# Patient Record
Sex: FEMALE | URBAN - METROPOLITAN AREA
[De-identification: names, ages, dates, MRNs, and addresses within clinical notes are randomized per-mention and may not be internally consistent; named-entity substitution may affect disease eponyms.]

---

## 2018-06-13 ENCOUNTER — APPOINTMENT (RX ONLY)
Dept: URBAN - METROPOLITAN AREA CLINIC 100 | Facility: CLINIC | Age: 78
Setting detail: DERMATOLOGY
End: 2018-06-13

## 2018-06-13 DIAGNOSIS — Z41.9 ENCOUNTER FOR PROCEDURE FOR PURPOSES OTHER THAN REMEDYING HEALTH STATE, UNSPECIFIED: ICD-10-CM

## 2018-06-13 PROBLEM — I10 ESSENTIAL (PRIMARY) HYPERTENSION: Status: ACTIVE | Noted: 2018-06-13

## 2018-06-13 PROBLEM — L57.0 ACTINIC KERATOSIS: Status: ACTIVE | Noted: 2018-06-13

## 2018-06-13 PROCEDURE — ? FILLERS

## 2018-06-13 NOTE — PROCEDURE: FILLERS
Brows Filler  Volume In Cc: 0
Additional Area 5 Volume In Cc: 1
Price (Use Numbers Only, No Special Characters Or $): 0.00
Additional Anesthesia Volume In Cc: 6
Use Map Statement For Sites (Optional): No
Additional Area 2 Location: cheeks
Anesthesia Volume In Cc: 0.2
Additional Area 5 Location: cheeks and jaws
Post-Care Instructions: Patient instructed to apply ice to reduce swelling.
Detail Level: Zone
Additional Area 1 Location: using the cannula to lateral cheeks, using the conventional needle to lateral jawline
Additional Area 4 Location: fine line cheeks diluted with 0.2 lido
Map Statment: See [address] for Complete Details
Lot #: [id number]
Expiration Date (Month Year): 11/31/2020
Additional Area 2 Location: lateral cheeks, lateral mouth, marionette lines, lips, fine lines upper lip, glabella, nasal br
Additional Area 1 Location: lateral mouth, marionette lines, cicatrix left upper lip
Additional Area 3 Location: lateral eyes, nasal root
Expiration Date (Month Year): 10/31/2020
Lot #: [id number] [facility]
Additional Area 3 Location: lateral mouth, cheeks, perioral fine lines, lateral jaw
Consent: Written consent obtained. Risks include but not limited to bruising, beading, irregular texture, ulceration, infection, allergic reaction, scar formation, incomplete augmentation, temporary nature, procedural pain.
Filler: Kar Taylor

## 2019-12-18 ENCOUNTER — APPOINTMENT (RX ONLY)
Dept: URBAN - METROPOLITAN AREA CLINIC 100 | Facility: CLINIC | Age: 79
Setting detail: DERMATOLOGY
End: 2019-12-18

## 2019-12-18 DIAGNOSIS — Z41.9 ENCOUNTER FOR PROCEDURE FOR PURPOSES OTHER THAN REMEDYING HEALTH STATE, UNSPECIFIED: ICD-10-CM

## 2019-12-18 PROCEDURE — ? FILLERS

## 2019-12-18 NOTE — PROCEDURE: FILLERS
Nasolabial Folds Filler Volume In Cc: 0
Lot #: [id number]
Lot #: [id number]
Map Statment: See [address] for Complete Details
Include Cannula Information In Note?: No
Expiration Date (Month Year): 09/2021
Expiration Date (Month Year): 2022-4-30
Anesthesia Type: 1% lidocaine without epinephrine
Lot #: [id number]
Include Cannula Brand?: DermaSculpt
Expiration Date (Month Year): 08/31/2019
Include Cannula Size?: 25G
Additional Area 1 Location: , lateral mouth,diluted to inject lip fine lines
Include Cannula Length?: 1.5 inch
Additional Anesthesia Volume In Cc: 6
Additional Area 1 Location: lateral mouth, perioral fine lines, vermillion lips, marionette lines
Additional Area 1 Volume In Cc: 1
Additional Area 2 Location: Lips, oral commissure, glabellar fine fines
Additional Area 2 Location: cheeks, jawline, oral commissure
Consent: Written consent obtained. Risks include but not limited to bruising, beading, irregular texture, ulceration, infection, allergic reaction, scar formation, incomplete augmentation, temporary nature, procedural pain.
Additional Area 3 Location: Glabbellar fine lines, Nasalabial folds, Marionette lines, vermillion lip
Post-Care Instructions: Patient instructed to apply ice to reduce swelling.
Filler: Restylane-L
Additional Area 1 Location: lateral mouth, fine lines perioral, marionette lines, lateral cheeks, vermillion lips
Additional Area 4 Location: lateral jawline, lateral mouth, glabella lines,
Detail Level: Zone
Additional Area 4 Location: Perioral
Additional Area 2 Location: Nasalabial, Glabellar fine lines- Complimentary
Additional Area 5 Location: Cheeks, vermillion lips, marionette fine lines, glabellar fine lines, lateral mouth
Price (Use Numbers Only, No Special Characters Or $): 0.00

## 2021-10-13 ENCOUNTER — APPOINTMENT (RX ONLY)
Dept: URBAN - METROPOLITAN AREA CLINIC 100 | Facility: CLINIC | Age: 81
Setting detail: DERMATOLOGY
End: 2021-10-13

## 2021-10-13 DIAGNOSIS — Z41.9 ENCOUNTER FOR PROCEDURE FOR PURPOSES OTHER THAN REMEDYING HEALTH STATE, UNSPECIFIED: ICD-10-CM

## 2021-10-13 PROCEDURE — ? FILLERS

## 2021-10-13 NOTE — PROCEDURE: FILLERS
Additional Area 4 Location: lateral jawline, lateral mouth, glabella lines,
Tear Troughs Filler  Volume In Cc: 0
Post-Care Instructions: Patient instructed to apply ice to reduce swelling.
Price (Use Numbers Only, No Special Characters Or $): 0.00
Include Cannula Information In Note?: No
Additional Area 4 Location: Perioral
Additional Area 5 Location: Cheeks, vermillion lips, marionette fine lines, glabellar fine lines, lateral mouth
Lot #: [id number]
Lot #: [id number]
Map Statment: See [address] for Complete Details
Expiration Date (Month Year): 08/31/2019
Lot #: [id number]
Expiration Date (Month Year): 2024-01-31
Anesthesia Type: 1% lidocaine without epinephrine
Expiration Date (Month Year): 09/2021
Additional Area 1 Location: lateral mouth, fine lines perioral, marionette lines, lateral cheeks, vermillion lips
Include Cannula Brand?: DermaSculpt
Include Cannula Size?: 25G
Additional Anesthesia Volume In Cc: 6
Additional Area 2 Location: Nasalabial, Glabellar fine lines- Complimentary
Additional Area 1 Location: cheeks, lateral mouth, nlfs , glabella
Include Cannula Length?: 1.5 inch
Additional Area 1 Location: lateral mouth, perioral fine lines, vermillion lips, marionette lines
Additional Area 2 Location: perioral fine lines, vermillion lips, marionette lines.
Additional Area 2 Location: Lips, oral commissure, glabellar fine fines
Additional Area 2 Volume In Cc: 1
Filler: Restylane-L
Consent: Written consent obtained. Risks include but not limited to bruising, beading, irregular texture, ulceration, infection, allergic reaction, scar formation, incomplete augmentation, temporary nature, procedural pain.
Detail Level: Zone
Additional Area 3 Location: Glabbellar fine lines, Nasalabial folds, Marionette lines, vermillion lip

## 2025-03-27 ENCOUNTER — INPATIENT (INPATIENT)
Facility: HOSPITAL | Age: 85
LOS: 3 days | Discharge: HOME | End: 2025-03-31
Attending: HOSPITALIST | Admitting: HOSPITALIST
Payer: MEDICARE

## 2025-03-27 VITALS
OXYGEN SATURATION: 98 % | WEIGHT: 134.92 LBS | HEART RATE: 100 BPM | SYSTOLIC BLOOD PRESSURE: 123 MMHG | RESPIRATION RATE: 18 BRPM | TEMPERATURE: 100 F | HEIGHT: 63 IN | DIASTOLIC BLOOD PRESSURE: 80 MMHG

## 2025-03-27 LAB
ALBUMIN SERPL ELPH-MCNC: 4.3 G/DL — SIGNIFICANT CHANGE UP (ref 3.3–5)
ALP SERPL-CCNC: 75 U/L — SIGNIFICANT CHANGE UP (ref 40–120)
ALT FLD-CCNC: 14 U/L — SIGNIFICANT CHANGE UP (ref 4–33)
ANION GAP SERPL CALC-SCNC: 18 MMOL/L — HIGH (ref 7–14)
ANISOCYTOSIS BLD QL: SLIGHT — SIGNIFICANT CHANGE UP
APPEARANCE UR: CLEAR — SIGNIFICANT CHANGE UP
APTT BLD: 29.5 SEC — SIGNIFICANT CHANGE UP (ref 24.5–35.6)
AST SERPL-CCNC: 25 U/L — SIGNIFICANT CHANGE UP (ref 4–32)
BACTERIA # UR AUTO: ABNORMAL /HPF
BASOPHILS # BLD AUTO: 0 K/UL — SIGNIFICANT CHANGE UP (ref 0–0.2)
BASOPHILS NFR BLD AUTO: 0 % — SIGNIFICANT CHANGE UP (ref 0–2)
BILIRUB SERPL-MCNC: 0.8 MG/DL — SIGNIFICANT CHANGE UP (ref 0.2–1.2)
BILIRUB UR-MCNC: NEGATIVE — SIGNIFICANT CHANGE UP
BLOOD GAS VENOUS COMPREHENSIVE RESULT: SIGNIFICANT CHANGE UP
BUN SERPL-MCNC: 25 MG/DL — HIGH (ref 7–23)
CALCIUM SERPL-MCNC: 9.7 MG/DL — SIGNIFICANT CHANGE UP (ref 8.4–10.5)
CAST: 1 /LPF — SIGNIFICANT CHANGE UP (ref 0–4)
CHLORIDE SERPL-SCNC: 101 MMOL/L — SIGNIFICANT CHANGE UP (ref 98–107)
CO2 SERPL-SCNC: 18 MMOL/L — LOW (ref 22–31)
COLOR SPEC: YELLOW — SIGNIFICANT CHANGE UP
CREAT SERPL-MCNC: 0.81 MG/DL — SIGNIFICANT CHANGE UP (ref 0.5–1.3)
DIFF PNL FLD: NEGATIVE — SIGNIFICANT CHANGE UP
EGFR: 71 ML/MIN/1.73M2 — SIGNIFICANT CHANGE UP
EGFR: 71 ML/MIN/1.73M2 — SIGNIFICANT CHANGE UP
EOSINOPHIL # BLD AUTO: 0 K/UL — SIGNIFICANT CHANGE UP (ref 0–0.5)
EOSINOPHIL NFR BLD AUTO: 0 % — SIGNIFICANT CHANGE UP (ref 0–6)
FLUAV AG NPH QL: SIGNIFICANT CHANGE UP
FLUBV AG NPH QL: SIGNIFICANT CHANGE UP
GIANT PLATELETS BLD QL SMEAR: PRESENT — SIGNIFICANT CHANGE UP
GLUCOSE SERPL-MCNC: 163 MG/DL — HIGH (ref 70–99)
GLUCOSE UR QL: NEGATIVE MG/DL — SIGNIFICANT CHANGE UP
HCT VFR BLD CALC: 41.3 % — SIGNIFICANT CHANGE UP (ref 34.5–45)
HGB BLD-MCNC: 13.3 G/DL — SIGNIFICANT CHANGE UP (ref 11.5–15.5)
IANC: 15.61 K/UL — HIGH (ref 1.8–7.4)
INR BLD: 1.03 RATIO — SIGNIFICANT CHANGE UP (ref 0.85–1.16)
KETONES UR-MCNC: ABNORMAL MG/DL
LEUKOCYTE ESTERASE UR-ACNC: ABNORMAL
LIDOCAIN IGE QN: 18 U/L — SIGNIFICANT CHANGE UP (ref 7–60)
LYMPHOCYTES # BLD AUTO: 0.29 K/UL — LOW (ref 1–3.3)
LYMPHOCYTES # BLD AUTO: 1.7 % — LOW (ref 13–44)
MACROCYTES BLD QL: SLIGHT — SIGNIFICANT CHANGE UP
MCHC RBC-ENTMCNC: 28.7 PG — SIGNIFICANT CHANGE UP (ref 27–34)
MCHC RBC-ENTMCNC: 32.2 G/DL — SIGNIFICANT CHANGE UP (ref 32–36)
MCV RBC AUTO: 89 FL — SIGNIFICANT CHANGE UP (ref 80–100)
MONOCYTES # BLD AUTO: 0.29 K/UL — SIGNIFICANT CHANGE UP (ref 0–0.9)
MONOCYTES NFR BLD AUTO: 1.7 % — LOW (ref 2–14)
NEUTROPHILS # BLD AUTO: 16.39 K/UL — HIGH (ref 1.8–7.4)
NEUTROPHILS NFR BLD AUTO: 96.6 % — HIGH (ref 43–77)
NITRITE UR-MCNC: POSITIVE
OVALOCYTES BLD QL SMEAR: SLIGHT — SIGNIFICANT CHANGE UP
PH UR: 6 — SIGNIFICANT CHANGE UP (ref 5–8)
PLAT MORPH BLD: NORMAL — SIGNIFICANT CHANGE UP
PLATELET # BLD AUTO: 237 K/UL — SIGNIFICANT CHANGE UP (ref 150–400)
PLATELET COUNT - ESTIMATE: NORMAL — SIGNIFICANT CHANGE UP
POIKILOCYTOSIS BLD QL AUTO: SLIGHT — SIGNIFICANT CHANGE UP
POLYCHROMASIA BLD QL SMEAR: SLIGHT — SIGNIFICANT CHANGE UP
POTASSIUM SERPL-MCNC: 4.8 MMOL/L — SIGNIFICANT CHANGE UP (ref 3.5–5.3)
POTASSIUM SERPL-SCNC: 4.8 MMOL/L — SIGNIFICANT CHANGE UP (ref 3.5–5.3)
PROT SERPL-MCNC: 7.1 G/DL — SIGNIFICANT CHANGE UP (ref 6–8.3)
PROT UR-MCNC: 30 MG/DL
PROTHROM AB SERPL-ACNC: 11.9 SEC — SIGNIFICANT CHANGE UP (ref 9.9–13.4)
RBC # BLD: 4.64 M/UL — SIGNIFICANT CHANGE UP (ref 3.8–5.2)
RBC # FLD: 14.5 % — SIGNIFICANT CHANGE UP (ref 10.3–14.5)
RBC BLD AUTO: ABNORMAL
RBC CASTS # UR COMP ASSIST: 1 /HPF — SIGNIFICANT CHANGE UP (ref 0–4)
RSV RNA NPH QL NAA+NON-PROBE: SIGNIFICANT CHANGE UP
SARS-COV-2 RNA SPEC QL NAA+PROBE: SIGNIFICANT CHANGE UP
SODIUM SERPL-SCNC: 137 MMOL/L — SIGNIFICANT CHANGE UP (ref 135–145)
SOURCE RESPIRATORY: SIGNIFICANT CHANGE UP
SP GR SPEC: 1.02 — SIGNIFICANT CHANGE UP (ref 1–1.03)
SQUAMOUS # UR AUTO: 1 /HPF — SIGNIFICANT CHANGE UP (ref 0–5)
TROPONIN T, HIGH SENSITIVITY RESULT: 9 NG/L — SIGNIFICANT CHANGE UP
UROBILINOGEN FLD QL: 0.2 MG/DL — SIGNIFICANT CHANGE UP (ref 0.2–1)
WBC # BLD: 16.97 K/UL — HIGH (ref 3.8–10.5)
WBC # FLD AUTO: 16.97 K/UL — HIGH (ref 3.8–10.5)
WBC UR QL: 5 /HPF — SIGNIFICANT CHANGE UP (ref 0–5)

## 2025-03-27 PROCEDURE — 99285 EMERGENCY DEPT VISIT HI MDM: CPT | Mod: GC

## 2025-03-27 RX ORDER — PIPERACILLIN-TAZO-DEXTROSE,ISO 3.375G/5
3.38 IV SOLUTION, PIGGYBACK PREMIX FROZEN(ML) INTRAVENOUS ONCE
Refills: 0 | Status: COMPLETED | OUTPATIENT
Start: 2025-03-27 | End: 2025-03-27

## 2025-03-27 RX ORDER — ONDANSETRON HCL/PF 4 MG/2 ML
4 VIAL (ML) INJECTION ONCE
Refills: 0 | Status: COMPLETED | OUTPATIENT
Start: 2025-03-27 | End: 2025-03-27

## 2025-03-27 RX ORDER — VANCOMYCIN HCL IN 5 % DEXTROSE 1.5G/250ML
1000 PLASTIC BAG, INJECTION (ML) INTRAVENOUS ONCE
Refills: 0 | Status: COMPLETED | OUTPATIENT
Start: 2025-03-27 | End: 2025-03-27

## 2025-03-27 RX ADMIN — Medication 200 GRAM(S): at 22:46

## 2025-03-27 RX ADMIN — Medication 250 MILLIGRAM(S): at 23:21

## 2025-03-27 RX ADMIN — Medication 1900 MILLILITER(S): at 21:45

## 2025-03-27 NOTE — ED PROVIDER NOTE - CLINICAL SUMMARY MEDICAL DECISION MAKING FREE TEXT BOX
84-year-old female with history of HTN, DM, hypothyroidism, status post renal transplant in 2011 on tacrolimus and prednisone (gets care with Dr. Musa Urbina at [facility]) presenting with 1 day history of bilious nonbloody vomiting and diarrhea in the setting of malaise over the past couple days as well as some cough and congestion.  Has some abdominal discomfort but no focal pain.  No sick contacts, recent travel, recent antibiotic use.  Denies chest pain, shortness of breath, urinary symptoms, syncope.    Triage vitals notable for heart rate 100, /80, low-grade temp of 100 orally, no hypoxia on room air.  On exam appears ill, fatigued.  HEENT with dry mucous membranes, EOMI, no scleral icterus.  Normal work of breathing on room air, lungs CTAB, regular rhythm.  Warm and well-perfused.  Abdomen soft with epigastric tenderness and some mild tenderness over her transplanted kidney in the right lower quadrant.  No lower extremity edema or deformities.    Concern for infection in immunocompromised patient.  Differential includes gastroenteritis, GI infection, flu, COVID, pancreatitis, others.  Will plan on infectious workup including CBC, CMP, VBG, UA, blood cultures, flu COVID swab, CT abdomen pelvis Noncon given renal transplant history and reassess.  Will attempt to reach out to Dr. urbina.

## 2025-03-27 NOTE — ED ADULT NURSE NOTE - OBJECTIVE STATEMENT
Pt received to room 11. Pt is a 84 year old F  with Hx of kidney transplant. patient has c/o of feeling weak. patient states for a few days now she has not been feeling well and today she felt dizzy and put her self on the ground. patient states she has been spitting up phlegm. denies nausea, headache, dizziness, abdominal pain, urinary symptoms.   Pt is A&Ox4, ambulatory without assistance. neuro/sensory intact. airway patent, speaking clearly in full sentences. breathing is even and unlabored.  spontaneous movement of all extremities. Placed on cardiac monitor and continuous pulse oximetry. EKG in chart. VS as noted in flowsheet. 20G L wrist  IV placed, +blood return, flushes without difficulty. labs collected and sent. medications administered as ordered. awaiting imaging. comfort measures provided. stretcher set in lowest position, call bell within reach, safety maintained.

## 2025-03-27 NOTE — ED PROVIDER NOTE - PROGRESS NOTE DETAILS
Ernst Grier MD PGY-1: attempted to call Dr. Musa Urbina (pt's transplant team), but unable to reach at this time. Patient with UTI and colitis on CT. Will admit for sepsis in the setting of being in an immunocompromised state. Abida Haile DO (PGY-2)

## 2025-03-27 NOTE — ED ADULT TRIAGE NOTE - CHIEF COMPLAINT QUOTE
pt with n/v/ d  , fever and chills today. Pt was found on the floor by EMS they state she did not fall but sat down because she felt weak. Pt actively vomiting.

## 2025-03-27 NOTE — ED PROVIDER NOTE - CCCP TRG CHIEF CMPLNT
Powder sunscreens can be helpful for touch ups of your sunscreen, used over makeup for extra SPF protection, or on your part line.    - Supergoop! Invisible Setting Powder SPF 45  - Tarteguard Mineral Powder Sunscreen Broad Spectrum SPF 30  - ColorScience SUNFORGETTABLE® TOTAL PROTECTION™ BRUSH-ON SHIELD SPF 50 or SPF 30  - [employer]'s Choice ON-THE-GO SHIELDING POWDER SPF 30     nausea /diarrhea

## 2025-03-28 DIAGNOSIS — Z94.0 KIDNEY TRANSPLANT STATUS: ICD-10-CM

## 2025-03-28 DIAGNOSIS — N83.209 UNSPECIFIED OVARIAN CYST, UNSPECIFIED SIDE: ICD-10-CM

## 2025-03-28 DIAGNOSIS — E03.9 HYPOTHYROIDISM, UNSPECIFIED: ICD-10-CM

## 2025-03-28 DIAGNOSIS — E11.9 TYPE 2 DIABETES MELLITUS WITHOUT COMPLICATIONS: ICD-10-CM

## 2025-03-28 DIAGNOSIS — Z29.9 ENCOUNTER FOR PROPHYLACTIC MEASURES, UNSPECIFIED: ICD-10-CM

## 2025-03-28 DIAGNOSIS — I10 ESSENTIAL (PRIMARY) HYPERTENSION: ICD-10-CM

## 2025-03-28 DIAGNOSIS — K21.9 GASTRO-ESOPHAGEAL REFLUX DISEASE WITHOUT ESOPHAGITIS: ICD-10-CM

## 2025-03-28 DIAGNOSIS — K52.9 NONINFECTIVE GASTROENTERITIS AND COLITIS, UNSPECIFIED: ICD-10-CM

## 2025-03-28 DIAGNOSIS — N39.0 URINARY TRACT INFECTION, SITE NOT SPECIFIED: ICD-10-CM

## 2025-03-28 DIAGNOSIS — A41.9 SEPSIS, UNSPECIFIED ORGANISM: ICD-10-CM

## 2025-03-28 DIAGNOSIS — D84.9 IMMUNODEFICIENCY, UNSPECIFIED: ICD-10-CM

## 2025-03-28 LAB
A1C WITH ESTIMATED AVERAGE GLUCOSE RESULT: 7.2 % — HIGH (ref 4–5.6)
ADD ON TEST-SPECIMEN IN LAB: SIGNIFICANT CHANGE UP
ANION GAP SERPL CALC-SCNC: 11 MMOL/L — SIGNIFICANT CHANGE UP (ref 7–14)
B PERT DNA SPEC QL NAA+PROBE: SIGNIFICANT CHANGE UP
B PERT+PARAPERT DNA PNL SPEC NAA+PROBE: SIGNIFICANT CHANGE UP
BUN SERPL-MCNC: 15 MG/DL — SIGNIFICANT CHANGE UP (ref 7–23)
C PNEUM DNA SPEC QL NAA+PROBE: SIGNIFICANT CHANGE UP
CALCIUM SERPL-MCNC: 9 MG/DL — SIGNIFICANT CHANGE UP (ref 8.4–10.5)
CHLORIDE SERPL-SCNC: 107 MMOL/L — SIGNIFICANT CHANGE UP (ref 98–107)
CO2 SERPL-SCNC: 22 MMOL/L — SIGNIFICANT CHANGE UP (ref 22–31)
CREAT SERPL-MCNC: 0.79 MG/DL — SIGNIFICANT CHANGE UP (ref 0.5–1.3)
EGFR: 73 ML/MIN/1.73M2 — SIGNIFICANT CHANGE UP
EGFR: 73 ML/MIN/1.73M2 — SIGNIFICANT CHANGE UP
ESTIMATED AVERAGE GLUCOSE: 160 — SIGNIFICANT CHANGE UP
FLUAV SUBTYP SPEC NAA+PROBE: SIGNIFICANT CHANGE UP
FLUBV RNA SPEC QL NAA+PROBE: SIGNIFICANT CHANGE UP
GLUCOSE BLDC GLUCOMTR-MCNC: 129 MG/DL — HIGH (ref 70–99)
GLUCOSE BLDC GLUCOMTR-MCNC: 133 MG/DL — HIGH (ref 70–99)
GLUCOSE BLDC GLUCOMTR-MCNC: 165 MG/DL — HIGH (ref 70–99)
GLUCOSE BLDC GLUCOMTR-MCNC: 167 MG/DL — HIGH (ref 70–99)
GLUCOSE BLDC GLUCOMTR-MCNC: 172 MG/DL — HIGH (ref 70–99)
GLUCOSE SERPL-MCNC: 129 MG/DL — HIGH (ref 70–99)
HADV DNA SPEC QL NAA+PROBE: SIGNIFICANT CHANGE UP
HCOV 229E RNA SPEC QL NAA+PROBE: SIGNIFICANT CHANGE UP
HCOV HKU1 RNA SPEC QL NAA+PROBE: SIGNIFICANT CHANGE UP
HCOV NL63 RNA SPEC QL NAA+PROBE: SIGNIFICANT CHANGE UP
HCOV OC43 RNA SPEC QL NAA+PROBE: SIGNIFICANT CHANGE UP
HCT VFR BLD CALC: 37.2 % — SIGNIFICANT CHANGE UP (ref 34.5–45)
HGB BLD-MCNC: 12.1 G/DL — SIGNIFICANT CHANGE UP (ref 11.5–15.5)
HMPV RNA SPEC QL NAA+PROBE: SIGNIFICANT CHANGE UP
HPIV1 RNA SPEC QL NAA+PROBE: SIGNIFICANT CHANGE UP
HPIV2 RNA SPEC QL NAA+PROBE: SIGNIFICANT CHANGE UP
HPIV3 RNA SPEC QL NAA+PROBE: SIGNIFICANT CHANGE UP
HPIV4 RNA SPEC QL NAA+PROBE: SIGNIFICANT CHANGE UP
M PNEUMO DNA SPEC QL NAA+PROBE: SIGNIFICANT CHANGE UP
MAGNESIUM SERPL-MCNC: 1.5 MG/DL — LOW (ref 1.6–2.6)
MCHC RBC-ENTMCNC: 28.6 PG — SIGNIFICANT CHANGE UP (ref 27–34)
MCHC RBC-ENTMCNC: 32.5 G/DL — SIGNIFICANT CHANGE UP (ref 32–36)
MCV RBC AUTO: 87.9 FL — SIGNIFICANT CHANGE UP (ref 80–100)
NRBC # BLD AUTO: 0 K/UL — SIGNIFICANT CHANGE UP (ref 0–0)
NRBC # FLD: 0 K/UL — SIGNIFICANT CHANGE UP (ref 0–0)
NRBC BLD AUTO-RTO: 0 /100 WBCS — SIGNIFICANT CHANGE UP (ref 0–0)
PHOSPHATE SERPL-MCNC: 3.4 MG/DL — SIGNIFICANT CHANGE UP (ref 2.5–4.5)
PLATELET # BLD AUTO: 218 K/UL — SIGNIFICANT CHANGE UP (ref 150–400)
POTASSIUM SERPL-MCNC: 3.9 MMOL/L — SIGNIFICANT CHANGE UP (ref 3.5–5.3)
POTASSIUM SERPL-SCNC: 3.9 MMOL/L — SIGNIFICANT CHANGE UP (ref 3.5–5.3)
RAPID RVP RESULT: SIGNIFICANT CHANGE UP
RBC # BLD: 4.23 M/UL — SIGNIFICANT CHANGE UP (ref 3.8–5.2)
RBC # FLD: 14.4 % — SIGNIFICANT CHANGE UP (ref 10.3–14.5)
RSV RNA SPEC QL NAA+PROBE: SIGNIFICANT CHANGE UP
RV+EV RNA SPEC QL NAA+PROBE: SIGNIFICANT CHANGE UP
SARS-COV-2 RNA SPEC QL NAA+PROBE: SIGNIFICANT CHANGE UP
SODIUM SERPL-SCNC: 140 MMOL/L — SIGNIFICANT CHANGE UP (ref 135–145)
TACROLIMUS SERPL-MCNC: 4.9 NG/ML — SIGNIFICANT CHANGE UP
TSH SERPL-MCNC: 1.7 UIU/ML — SIGNIFICANT CHANGE UP (ref 0.27–4.2)
WBC # BLD: 11.12 K/UL — HIGH (ref 3.8–10.5)
WBC # FLD AUTO: 11.12 K/UL — HIGH (ref 3.8–10.5)

## 2025-03-28 PROCEDURE — 99223 1ST HOSP IP/OBS HIGH 75: CPT | Mod: GC

## 2025-03-28 PROCEDURE — 12345: CPT | Mod: NC,GC

## 2025-03-28 PROCEDURE — 71046 X-RAY EXAM CHEST 2 VIEWS: CPT | Mod: 26

## 2025-03-28 PROCEDURE — 74177 CT ABD & PELVIS W/CONTRAST: CPT | Mod: 26

## 2025-03-28 RX ORDER — ATORVASTATIN CALCIUM 80 MG/1
10 TABLET, FILM COATED ORAL AT BEDTIME
Refills: 0 | Status: DISCONTINUED | OUTPATIENT
Start: 2025-03-28 | End: 2025-03-31

## 2025-03-28 RX ORDER — METOPROLOL SUCCINATE 50 MG/1
1 TABLET, EXTENDED RELEASE ORAL
Refills: 0 | DISCHARGE

## 2025-03-28 RX ORDER — TACROLIMUS 0.5 MG/1
1.5 CAPSULE ORAL
Refills: 0 | Status: DISCONTINUED | OUTPATIENT
Start: 2025-03-28 | End: 2025-03-28

## 2025-03-28 RX ORDER — INSULIN LISPRO 100 U/ML
INJECTION, SOLUTION INTRAVENOUS; SUBCUTANEOUS
Refills: 0 | Status: DISCONTINUED | OUTPATIENT
Start: 2025-03-28 | End: 2025-03-31

## 2025-03-28 RX ORDER — INSULIN LISPRO 100 U/ML
INJECTION, SOLUTION INTRAVENOUS; SUBCUTANEOUS AT BEDTIME
Refills: 0 | Status: DISCONTINUED | OUTPATIENT
Start: 2025-03-28 | End: 2025-03-31

## 2025-03-28 RX ORDER — METFORMIN HYDROCHLORIDE 850 MG/1
1 TABLET ORAL
Refills: 0 | DISCHARGE

## 2025-03-28 RX ORDER — METHENAMINE MANDELATE 1 G
1 TABLET ORAL
Refills: 0 | DISCHARGE

## 2025-03-28 RX ORDER — OMEPRAZOLE 20 MG/1
1 CAPSULE, DELAYED RELEASE ORAL
Refills: 0 | DISCHARGE

## 2025-03-28 RX ORDER — SODIUM CHLORIDE 9 G/1000ML
1000 INJECTION, SOLUTION INTRAVENOUS
Refills: 0 | Status: DISCONTINUED | OUTPATIENT
Start: 2025-03-28 | End: 2025-03-31

## 2025-03-28 RX ORDER — ALENDRONATE SODIUM 70 MG/1
1 TABLET ORAL
Refills: 0 | DISCHARGE

## 2025-03-28 RX ORDER — AMLODIPINE BESYLATE 10 MG/1
1 TABLET ORAL
Refills: 0 | DISCHARGE

## 2025-03-28 RX ORDER — MAGNESIUM SULFATE 500 MG/ML
2 SYRINGE (ML) INJECTION ONCE
Refills: 0 | Status: COMPLETED | OUTPATIENT
Start: 2025-03-28 | End: 2025-03-28

## 2025-03-28 RX ORDER — AMLODIPINE BESYLATE 10 MG/1
2.5 TABLET ORAL DAILY
Refills: 0 | Status: DISCONTINUED | OUTPATIENT
Start: 2025-03-28 | End: 2025-03-28

## 2025-03-28 RX ORDER — GLUCAGON 3 MG/1
1 POWDER NASAL ONCE
Refills: 0 | Status: DISCONTINUED | OUTPATIENT
Start: 2025-03-28 | End: 2025-03-31

## 2025-03-28 RX ORDER — ACETAMINOPHEN 500 MG/5ML
650 LIQUID (ML) ORAL EVERY 6 HOURS
Refills: 0 | Status: DISCONTINUED | OUTPATIENT
Start: 2025-03-28 | End: 2025-03-31

## 2025-03-28 RX ORDER — TACROLIMUS 0.5 MG/1
1.5 CAPSULE ORAL
Refills: 0 | Status: DISCONTINUED | OUTPATIENT
Start: 2025-03-28 | End: 2025-03-31

## 2025-03-28 RX ORDER — LEVOTHYROXINE SODIUM 300 MCG
37.5 TABLET ORAL DAILY
Refills: 0 | Status: DISCONTINUED | OUTPATIENT
Start: 2025-03-28 | End: 2025-03-31

## 2025-03-28 RX ORDER — B1/B2/B3/B5/B6/B12/VIT C/FOLIC 500-0.5 MG
1 TABLET ORAL DAILY
Refills: 0 | Status: DISCONTINUED | OUTPATIENT
Start: 2025-03-28 | End: 2025-03-28

## 2025-03-28 RX ORDER — PREDNISONE 20 MG/1
1 TABLET ORAL
Refills: 0 | DISCHARGE

## 2025-03-28 RX ORDER — DEXTROSE 50 % IN WATER 50 %
25 SYRINGE (ML) INTRAVENOUS ONCE
Refills: 0 | Status: DISCONTINUED | OUTPATIENT
Start: 2025-03-28 | End: 2025-03-31

## 2025-03-28 RX ORDER — DEXTROSE 50 % IN WATER 50 %
12.5 SYRINGE (ML) INTRAVENOUS ONCE
Refills: 0 | Status: DISCONTINUED | OUTPATIENT
Start: 2025-03-28 | End: 2025-03-31

## 2025-03-28 RX ORDER — ENALAPRIL MALEATE 20 MG
1 TABLET ORAL
Refills: 0 | DISCHARGE

## 2025-03-28 RX ORDER — B1/B2/B3/B5/B6/B12/VIT C/FOLIC 500-0.5 MG
1 TABLET ORAL DAILY
Refills: 0 | Status: DISCONTINUED | OUTPATIENT
Start: 2025-03-28 | End: 2025-03-31

## 2025-03-28 RX ORDER — PIPERACILLIN-TAZO-DEXTROSE,ISO 3.375G/5
3.38 IV SOLUTION, PIGGYBACK PREMIX FROZEN(ML) INTRAVENOUS EVERY 8 HOURS
Refills: 0 | Status: DISCONTINUED | OUTPATIENT
Start: 2025-03-28 | End: 2025-03-31

## 2025-03-28 RX ORDER — DEXTROSE 50 % IN WATER 50 %
15 SYRINGE (ML) INTRAVENOUS ONCE
Refills: 0 | Status: DISCONTINUED | OUTPATIENT
Start: 2025-03-28 | End: 2025-03-31

## 2025-03-28 RX ORDER — MELATONIN 5 MG
3 TABLET ORAL AT BEDTIME
Refills: 0 | Status: DISCONTINUED | OUTPATIENT
Start: 2025-03-28 | End: 2025-03-31

## 2025-03-28 RX ORDER — ENOXAPARIN SODIUM 100 MG/ML
40 INJECTION SUBCUTANEOUS EVERY 24 HOURS
Refills: 0 | Status: DISCONTINUED | OUTPATIENT
Start: 2025-03-28 | End: 2025-03-31

## 2025-03-28 RX ORDER — SODIUM CHLORIDE 9 G/1000ML
1000 INJECTION, SOLUTION INTRAVENOUS
Refills: 0 | Status: DISCONTINUED | OUTPATIENT
Start: 2025-03-28 | End: 2025-03-28

## 2025-03-28 RX ORDER — LEVOTHYROXINE SODIUM 300 MCG
0.5 TABLET ORAL
Refills: 0 | DISCHARGE

## 2025-03-28 RX ORDER — PREDNISONE 20 MG/1
5 TABLET ORAL DAILY
Refills: 0 | Status: DISCONTINUED | OUTPATIENT
Start: 2025-03-28 | End: 2025-03-31

## 2025-03-28 RX ADMIN — TACROLIMUS 1.5 MILLIGRAM(S): 0.5 CAPSULE ORAL at 09:16

## 2025-03-28 RX ADMIN — PREDNISONE 5 MILLIGRAM(S): 20 TABLET ORAL at 15:19

## 2025-03-28 RX ADMIN — Medication 25 GRAM(S): at 09:16

## 2025-03-28 RX ADMIN — Medication 25 GRAM(S): at 21:40

## 2025-03-28 RX ADMIN — Medication 25 GRAM(S): at 15:18

## 2025-03-28 RX ADMIN — TACROLIMUS 1.5 MILLIGRAM(S): 0.5 CAPSULE ORAL at 20:43

## 2025-03-28 RX ADMIN — Medication 75 MILLILITER(S): at 21:43

## 2025-03-28 RX ADMIN — Medication 40 MILLIGRAM(S): at 09:16

## 2025-03-28 RX ADMIN — ATORVASTATIN CALCIUM 10 MILLIGRAM(S): 80 TABLET, FILM COATED ORAL at 21:40

## 2025-03-28 RX ADMIN — Medication 1 TABLET(S): at 15:18

## 2025-03-28 RX ADMIN — Medication 37.5 MICROGRAM(S): at 06:53

## 2025-03-28 NOTE — H&P ADULT - NSHPREVIEWOFSYSTEMS_GEN_ALL_CORE
REVIEW OF SYSTEMS:    CONSTITUTIONAL:  No weakness, fevers or chills  EYES/ENT:  No visual changes;  No vertigo or throat pain   NECK:  No pain or stiffness  RESPIRATORY:  No cough, wheezing, hemoptysis; No shortness of breath  CARDIOVASCULAR:  No chest pain or palpitations  GASTROINTESTINAL:  No abdominal or epigastric pain. No nausea, vomiting, or hematemesis; No diarrhea or constipation. No melena or hematochezia.  GENITOURINARY:  No dysuria, frequency or hematuria  MUSCULOSKELETAL:  FROM all extremities, normal strength, No calf tenderness  NEUROLOGICAL:  No numbness or weakness  SKIN:  No itching, rashes REVIEW OF SYSTEMS:    CONSTITUTIONAL:  No weakness, fevers or chills  EYES/ENT:  No visual changes;  No vertigo or throat pain   NECK:  No pain or stiffness  RESPIRATORY:  No cough, wheezing, hemoptysis; No shortness of breath  CARDIOVASCULAR:  No chest pain or palpitations  GASTROINTESTINAL:  +n/v, diarrhea. No abdominal or epigastric pain. No hematemesis; No diarrhea or constipation. No melena or hematochezia.  GENITOURINARY:  No dysuria, frequency or hematuria  MUSCULOSKELETAL:  FROM all extremities, normal strength, No calf tenderness  NEUROLOGICAL:  No numbness or weakness  SKIN:  No itching, rashes REVIEW OF SYSTEMS:    CONSTITUTIONAL:  +Generalized weakness, Denies fevers or chills  EYES/ENT:  No visual changes;  No vertigo or throat pain   NECK:  No pain or stiffness  RESPIRATORY:  No cough, wheezing, hemoptysis; No shortness of breath  CARDIOVASCULAR:  No chest pain or palpitations  GASTROINTESTINAL:  +n/v, diarrhea. No abdominal or epigastric pain. No hematemesis; No diarrhea or constipation. No melena or hematochezia.  GENITOURINARY:  No dysuria, frequency or hematuria  MUSCULOSKELETAL:  FROM all extremities, normal strength, No calf tenderness  NEUROLOGICAL:  No numbness or weakness  SKIN:  No itching, rashes

## 2025-03-28 NOTE — H&P ADULT - PROBLEM SELECTOR PLAN 4
Hx renal transplant in 2011  Follows w/Dr. Musa Urbina at [facility]  Home meds:     - continue home tac  - check tac level in AM   - continue prednisone   - notify Dr. Urbina of patient adm in AM Hx renal transplant in 2011  Follows w/Dr. Musa Urbina at [facility]  Home meds: tacrolimus 1.5mg BID, prednisone 5mg qd, myfortic 720mg BID     - continue home tac 1.5mg BID   - check tac level in AM   - continue prednisone 5mg  - hold myfortic in setting of active infection   - notify Dr. Urbina of patient adm in AM

## 2025-03-28 NOTE — H&P ADULT - HISTORY OF PRESENT ILLNESS
In the ED T max 101.5, , /80, RR 18, 98% on RA.  This is an 85 y/o F with PMHx HTN, T2DM, hypothyroidism, ESRD s/p renal transplant (2011) who presented to the ED for vomiting and diarrhea x 1 day.          In the ED T max 101.5, , /80, RR 18, 98% on RA. Labs significant for WBC 16.97, bicarb 18, U/A trace LE, positive nitrite, many bacteria. CT A/P w/ colitis involving the distal transverse colon, descending colon, and   sigmoid colon. s/p vanc 1g x1, zosyn 3.375gx1 and 1.9L NS bolus.    This is an 83 y/o F with PMHx HTN, T2DM, hypothyroidism, ESRD s/p renal transplant (2011), recurrent UTIs, T2DM and HLD who presented to the ED for generalized malaise x 2-3 days. Accompanying symptoms also include NBNB vomiting and diarrhea. Denied any fever, chills, chest pain, sob at home. Denied any sick contacts or recent travel.     Of note, patient w/ a history of recurrent UTIs. Prior cultures w/ Ecoli. She was on cefpodoxime 1 month ago for UTI.     In the ED T max 101.5, , /80, RR 18, 98% on RA. Labs significant for WBC 16.97, bicarb 18, U/A trace LE, positive nitrite, many bacteria. CT A/P w/ colitis involving the distal transverse colon, descending colon, and   sigmoid colon. s/p vanc 1g x1, zosyn 3.375gx1 and 1.9L NS bolus.    This is an 83 y/o F with PMHx HTN, T2DM, hypothyroidism, ESRD s/p renal transplant (2011), recurrent UTIs, and HLD who presented to the ED for generalized malaise x 2-3 days. Accompanying symptoms also include NBNB vomiting and diarrhea. Denied any fever, chills, chest pain, sob at home. Denied any sick contacts or recent travel.     Of note, patient w/ a history of recurrent UTIs. Prior cultures w/ Ecoli. She was on cefpodoxime 1 month ago for UTI.     In the ED T max 101.5, , /80, RR 18, 98% on RA. Labs significant for WBC 16.97, bicarb 18, U/A trace LE, positive nitrite, many bacteria. CT A/P w/ colitis involving the distal transverse colon, descending colon, and   sigmoid colon. s/p vanc 1g x1, zosyn 3.375gx1 and 1.9L NS bolus.

## 2025-03-28 NOTE — H&P ADULT - PROBLEM SELECTOR PLAN 1
WBC 16.97, T101.5, . Source likely colitis and UTI.   CXR and limited RVP negative.     - f/u blood cx x2  - f/u urine cx  - GI PCR, stool culture if pt still w/ diarrhea   - trend fever curve, WBC WBC 16.97, T101.5, . Source likely colitis and UTI.   CXR and limited RVP negative.   s/p vanc and zosyn     - continue zosyn   - f/u blood cx x2  - collect urine cx   - GI PCR, stool culture if pt still w/ diarrhea   - trend fever curve, WBC WBC 16.97, T101.5, . Source likely colitis and UTI.   CXR and limited RVP negative.   s/p vanc and zosyn     - continue zosyn   - f/u blood cx x2  - collect urine cx  - GI PCR, stool culture if pt still w/ diarrhea   - trend fever curve, WBC

## 2025-03-28 NOTE — PROGRESS NOTE ADULT - ATTENDING COMMENTS
c/w iv zosyn, ?? uti  f/u cultures Patient seen and examined with team  Agree with above a/p by Dr Bolanos  85 y/o F with PMHx HTN, T2DM, hypothyroidism, ESRD s/p renal transplant (2011), recurrent UTIs, T2DM and HLD who presented to the ED for generalized malaise x 2-3 days, admitted for sepsis 2/2 colitis and UTI.     PE nad, very pleasant lady  Vital Signs Last 24 Hrs T(F): 97.7 HR: 70 , BP: 120/75 , RR: 17 , SpO2: 99%  room air  Lungs cta, cor rrr, abd soft n/t ext neg e/c/c    WBC 16--> 11.2  3/27 UA--> pos luek/ nitrate  < from: Xray Chest 2 Views PA/Lat (03.28.25 @ 00:52) >  IMPRESSION: Clear lungs.    < from: CT Abdomen and Pelvis w/ IV Cont (03.28.25 @ 00:39) >  IMPRESSION:  Colitis involving the distal transverse colon, descending colon, and  sigmoid colon.  Colonic diverticulosis without evidence of diverticulitis.  Simple appearing cystic structure measuring 9.3 x 6.9 x 7.7 cm which   appears to rise from the right adnexa/ovary. Pelvic ultrasound is   recommended for further evaluation.    A/p   # Sepsis sec to UTI and possibly colitis c/w IV zosyn and f/u blood and urine cultures.  IVF hydration with NS, monitor lactate level  #Diarrhea, day # 2--> send stool studies gi pcr and cultures.  #Transplanted Kidney--> c/w home meds of pred/ prograft. Patient sees transplant physician at [place]--> House renal will see.  # Cystic R adenexa structure--> US ordered  # dvt proph lovenox sq  plan d/w patient and team Patient seen and examined with team  Agree with above a/p by Dr Bolanos  83 y/o F with PMHx HTN, T2DM, hypothyroidism, ESRD s/p renal transplant (2011), recurrent UTIs, T2DM and HLD who presented to the ED for generalized malaise x 2-3 days, admitted for sepsis 2/2 colitis and UTI.     PE nad, very pleasant lady  Vital Signs Last 24 Hrs T(F): 97.7 HR: 70 , BP: 120/75 , RR: 17 , SpO2: 99%  room air  Lungs cta, cor rrr, abd soft n/t ext neg e/c/c    WBC 16--> 11.2  3/27 UA--> pos luek/ nitrate  < from: Xray Chest 2 Views PA/Lat (03.28.25 @ 00:52) >  IMPRESSION: Clear lungs.    < from: CT Abdomen and Pelvis w/ IV Cont (03.28.25 @ 00:39) >  IMPRESSION:  Colitis involving the distal transverse colon, descending colon, and  sigmoid colon.  Colonic diverticulosis without evidence of diverticulitis.  Simple appearing cystic structure measuring 9.3 x 6.9 x 7.7 cm which   appears to rise from the right adnexa/ovary. Pelvic ultrasound is   recommended for further evaluation.    A/p   # Sepsis sec to UTI and possibly colitis c/w IV zosyn and f/u blood and urine cultures.  IVF hydration with NS, monitor lactate level  #Diarrhea, day # 2--> send stool studies gi pcr and cultures.  #Transplanted Kidney--> c/w home meds of pred/ prograft.  hold myfortic in setting of active infection   Patient sees transplant physician at [place]--> House renal will see.  # Cystic R adenexa structure--> US ordered  # dvt proph lovenox sq  plan d/w patient and team    Active problem  Sepsis sec to Uti and colitis  Diarrhaea  Renal transplant status ,   immunocompromised state   htn/ DM/hypothyroidism

## 2025-03-28 NOTE — H&P ADULT - ATTENDING COMMENTS
Patient seen and examined on 3/28/25, case discussed with Dr. Nydia Campoverde. This is an 84F with history as above who presents to the hospital with complaints of generalized weakness x days and non-bloody diarrhea x1 day with ?emesis. Patient denies fevers/chills, denies known sick contacts or recent travel out of the state. Denies other infectious complaints. Denies other acute complaints. Does state that she gets her meals delivered through [employer]. On examination she is laying in bed in NAD, oral mucosa slightly dry, heart sounds regular without m/r/g, andomen soft NTND with good bowel sounds, lungs CTAB, LE without edema, good peripheral pulses b/l. Patient's lab work and imaging reviewed.     -> Patient with sepsis given her leukocytosis, tachycardia, and fever with findings of colitis on imaging and positive UA (though she denies dysuria and hematuria). s/p vanc/zosyn in ED, for now will c/w empiric zosyn  -> Check UCx, Check GI PCR, BCx x2 in lab  -> Possible source of her GI symptoms might be her meals that are delivered to her -> advised her to avoid the current service she is using  -> Other management as above

## 2025-03-28 NOTE — CONSULT NOTE ADULT - ATTENDING COMMENTS
in the setting of colitis/diarrhea and infection   please Hold Myfortic  check blood cultures ans urine cultures  please check C-diff, stool cultures  please check CMV PCR from blood   Please DC amlodipine   start IVF with LR  if BP drops, will consider stress dose steroids  I called Dr. Urbina at [facility] to update him. a v/m message was left on his Cell phone   avoid contrast studies, ACE-I, ARB, or NSAIDs

## 2025-03-28 NOTE — PROGRESS NOTE ADULT - PROBLEM SELECTOR PLAN 2
CT A/P w/ colitis involving the distal transverse colon, descending colon, and sigmoid colon.    - abx as above  - GI PCR, stool culture if persistent diarrhea  - consider C diff if mets criteria given recent abx use CT A/P w/ colitis involving the distal transverse colon, descending colon, and sigmoid colon.    - abx as above  - GI PCR, stool culture if persistent diarrhea  - consider C diff if mets criteria given recent abx use        > Cefpodoxime in January

## 2025-03-28 NOTE — CONSULT NOTE ADULT - SUBJECTIVE AND OBJECTIVE BOX
[facility] DIVISION OF KIDNEY DISEASES AND HYPERTENSION -- [phone]  -- INITIAL CONSULT NOTE  --------------------------------------------------------------------------------  HPI: 83yo F w/ PMH of renal transplant (2012, never on dialysis), recurrent UTIs, HTN, HLD, T2DM, hypothyroidism admitted w/ urosepsis. Nephrology consulted for renal transplant/ immunosuppression management.     Pt seen and examined this morning. Report that she moved here from [place] Dec 2024 to be closer to family. However since then she has experienced on and off fatigue and weakness. However over the past 2-3 days she noticed that it was worsening, and yesterday has multiple episodes of diarrhea. Did not feel febrile, but pts daughter at bedside states that pt had fever as well. Today she feels better and diarrhea is improving, becoming more solid. No other complaints. At present no HA, fever, chills, N/V/D, abd pain, SOB, CP, LE swelling, dysuria. Has known h/o recurrent UTIS which led to her initial renal failure requiring renal transplant, however post transplant continues to have recurrent UTIs and is on methenamine hippurate. Follows with OP nephrologist Dr. Urbina ([facility]).     PAST HISTORY  --------------------------------------------------------------------------------  PAST MEDICAL & SURGICAL HISTORY:  Renal transplant recipient  Type 2 diabetes mellitus  Hypertension  Hypothyroidism  No significant past surgical history    FAMILY HISTORY:  No pertinent family history in first degree relatives    PAST SOCIAL HISTORY: N/A    ALLERGIES & MEDICATIONS  --------------------------------------------------------------------------------  Allergies    No Known Allergies    Intolerances    Standing Inpatient Medications  amLODIPine   Tablet 2.5 milliGRAM(s) Oral daily  atorvastatin 10 milliGRAM(s) Oral at bedtime  chlorhexidine 2% Cloths 1 Application(s) Topical <User Schedule>  dextrose 5%. 1000 milliLiter(s) IV Continuous <Continuous>  dextrose 5%. 1000 milliLiter(s) IV Continuous <Continuous>  dextrose 50% Injectable 25 Gram(s) IV Push once  dextrose 50% Injectable 12.5 Gram(s) IV Push once  dextrose 50% Injectable 25 Gram(s) IV Push once  enoxaparin Injectable 40 milliGRAM(s) SubCutaneous every 24 hours  glucagon  Injectable 1 milliGRAM(s) IntraMuscular once  levothyroxine 37.5 MICROGram(s) Oral daily  multivitamin 1 Tablet(s) Oral daily  pantoprazole    Tablet 40 milliGRAM(s) Oral before breakfast  piperacillin/tazobactam IVPB.. 3.375 Gram(s) IV Intermittent every 8 hours  predniSONE   Tablet 5 milliGRAM(s) Oral daily  tacrolimus 1.5 milliGRAM(s) Oral two times a day    PRN Inpatient Medications  acetaminophen     Tablet .. 650 milliGRAM(s) Oral every 6 hours PRN  dextrose Oral Gel 15 Gram(s) Oral once PRN  melatonin 3 milliGRAM(s) Oral at bedtime PRN      REVIEW OF SYSTEMS  --------------------------------------------------------------------------------  Gen: No fevers/chills  Skin: No rashes  Head/Eyes/Ears: No HA  Respiratory: No SOB, cough  CV: No CP  GI: See HPI  : No dysuria, hematuria  MSK: No edema  Heme: No easy bruising or bleeding  Psych: No significant depression    All other systems were reviewed and are negative, except as noted.    VITALS/PHYSICAL EXAM  --------------------------------------------------------------------------------  T(C): 36.6 (03-28-25 @ 12:33), Max: 38.6 (03-27-25 @ 21:30)  HR: 73 (03-28-25 @ 12:33) (70 - 100)  BP: 130/87 (03-28-25 @ 12:33) (120/75 - 136/60)  RR: 18 (03-28-25 @ 12:33) (15 - 18)  SpO2: 100% (03-28-25 @ 12:33) (97% - 100%)  Wt(kg): --  Height (cm): 160 (03-28-25 @ 04:29)  Weight (kg): 53.9 (03-28-25 @ 04:29)  BMI (kg/m2): 21.1 (03-28-25 @ 04:29)  BSA (m2): 1.55 (03-28-25 @ 04:29)    Physical Exam:  	Gen: NAD  	HEENT: MMM  	Pulm: CTA B/L  	CV: S1S2  	Abd: Soft, +BS               Transplant: Non tender, no bruit   	Ext: No LE edema B/L  	Neuro: Awake  	Skin: Warm and dry      LABS/STUDIES  --------------------------------------------------------------------------------              12.1   11.12 >-----------<  218      [03-28-25 @ 07:35]              37.2     140  |  107  |  15  ----------------------------<  129      [03-28-25 @ 07:35]  3.9   |  22  |  0.79        Ca     9.0     [03-28-25 @ 07:35]      Mg     1.50     [03-28-25 @ 07:35]      Phos  3.4     [03-28-25 @ 07:35]    TPro  7.1  /  Alb  4.3  /  TBili  0.8  /  DBili  x   /  AST  25  /  ALT  14  /  AlkPhos  75  [03-27-25 @ 21:30]    PT/INR: PT 11.9 , INR 1.03       [03-27-25 @ 21:30]  PTT: 29.5       [03-27-25 @ 21:30]    Creatinine Trend:  SCr 0.79 [03-28 @ 07:35]  SCr 0.81 [03-27 @ 21:30]    TSH 1.70      [03-28-25 @ 07:35]

## 2025-03-28 NOTE — PROGRESS NOTE ADULT - PROBLEM SELECTOR PLAN 1
WBC 16.97, T101.5, . Source likely colitis and UTI.   CXR and limited RVP negative.   s/p vanc and zosyn     - continue zosyn   - f/u blood cx x2  - collect urine cx   - GI PCR, stool culture if pt still w/ diarrhea   - trend fever curve, WBC

## 2025-03-28 NOTE — H&P ADULT - NSHPSOCIALHISTORY_GEN_ALL_CORE
Lives at home alone  Ambulates independently at baseline Lives at home alone  Ambulates independently at baseline  Denies tobacco, EtOH, or illicit substance use

## 2025-03-28 NOTE — H&P ADULT - NSHPPHYSICALEXAM_GEN_ALL_CORE
VITALS:   T(C): 36.5 (03-28-25 @ 04:29), Max: 38.6 (03-27-25 @ 21:30)  HR: 78 (03-28-25 @ 04:29) (78 - 100)  BP: 121/54 (03-28-25 @ 04:29) (121/54 - 136/60)  RR: 18 (03-28-25 @ 04:29) (15 - 18)  SpO2: 99% (03-28-25 @ 04:29) (97% - 99%)    GENERAL: NAD, lying in bed comfortably  HEAD:  Atraumatic, Normocephalic  EYES: conjunctiva and sclera clear  ENT: Moist mucous membranes  NECK: Supple, No JVD  CHEST/LUNG: Clear to auscultation bilaterally; No rales, rhonchi, wheezing, or rubs. Unlabored respirations  HEART: Regular rate and rhythm; No murmurs, rubs, or gallops  ABDOMEN: BSx4; Soft, nontender, nondistended  EXTREMITIES:   No clubbing, cyanosis, or edema  NERVOUS SYSTEM:  A&Ox3, no focal deficits   SKIN: No rashes or lesions  Psych: Normal speech, normal behavior, normal affect VITALS:   T(C): 36.5 (03-28-25 @ 04:29), Max: 38.6 (03-27-25 @ 21:30)  HR: 78 (03-28-25 @ 04:29) (78 - 100)  BP: 121/54 (03-28-25 @ 04:29) (121/54 - 136/60)  RR: 18 (03-28-25 @ 04:29) (15 - 18)  SpO2: 99% (03-28-25 @ 04:29) (97% - 99%)    GENERAL: NAD, lying in bed comfortably  HEAD:  Atraumatic, Normocephalic  EYES: conjunctiva and sclera clear  ENT: Moist mucous membranes  NECK: Supple, No JVD  CHEST/LUNG: Clear to auscultation bilaterally; No rales, rhonchi, wheezing, or rubs. Unlabored respirations  HEART: Regular rate and rhythm; No murmurs, rubs, or gallops  ABDOMEN: BSx4; Soft, nontender, nondistended, no CVA tenderness   EXTREMITIES:   No clubbing, cyanosis, or edema  NERVOUS SYSTEM:  A&Ox3, no focal deficits   SKIN: No rashes or lesions  Psych: Normal speech, normal behavior, normal affect VITALS:   T(C): 36.5 (03-28-25 @ 04:29), Max: 38.6 (03-27-25 @ 21:30)  HR: 78 (03-28-25 @ 04:29) (78 - 100)  BP: 121/54 (03-28-25 @ 04:29) (121/54 - 136/60)  RR: 18 (03-28-25 @ 04:29) (15 - 18)  SpO2: 99% (03-28-25 @ 04:29) (97% - 99%)    GENERAL: NAD, lying in bed comfortably  HEAD:  Atraumatic, Normocephalic  EYES: conjunctiva and sclera clear  ENT: Dry mucous membranes  NECK: Supple, No JVD  CHEST/LUNG: Clear to auscultation bilaterally; No rales, rhonchi, wheezing, or rubs. Unlabored respirations  HEART: Regular rate and rhythm; No murmurs, rubs, or gallops  ABDOMEN: BSx4; Soft, nontender, nondistended, no CVA tenderness   EXTREMITIES: +DP/PT/Radial pulses b/l, No LE edema or asymmetry noted  NERVOUS SYSTEM:  A&Ox3, no focal deficits   SKIN: No rashes or lesions  Psych: Normal speech, normal behavior, normal affect

## 2025-03-28 NOTE — PHYSICAL THERAPY INITIAL EVALUATION ADULT - LEVEL OF CONSCIOUSNESS, REHAB EVAL
Airway  Date/Time: 5/29/2019 12:17 AM  Urgency: emergent    Airway not difficult    General Information and Staff    Patient location during procedure: ICU    Consent for Airway (if performed for an anesthetic, see related documentation for consents)  Patient identity confirmed: per hospital policy  Consent: The procedure was performed in an emergent situation. Verbal consent not obtained. Written consent not obtained.   Risks and benefits: risks, benefits and alternatives were not discussed  Consent given by: other (add comment)      Code status verified:yes  Indications and Patient Condition  Indications for airway management: cardio/pulmonary arrest and cardiovascular instability  Spontaneous ventilation: present  Sedation level: deep  Preoxygenated: yes  MILS not maintained throughout  Mask difficulty assessment: vent by bag mask    Final Airway Details  Final airway type: endotracheal airway      Successful airway: ETT  Cuffed: yes   Successful intubation technique: direct laryngoscopy  Endotracheal tube insertion site: oral  Blade: Christopher  Blade size: #3  ETT size (mm): 7.5  Cormack-Lehane Classification: grade III - view of epiglottis only  Placement verified by: chest auscultation and capnometry   Measured from: lips  ETT to lips (cm): 23  Number of attempts at approach: 1  Ventilation between attempts: bag mask    Additional Comments  10 mg etomidate followed by 100 mg suxx
Pt should have a refill of this med  Would appreciate if you would pick this up for him, moving forward 
alert

## 2025-03-28 NOTE — PROGRESS NOTE ADULT - SUBJECTIVE AND OBJECTIVE BOX
KRYSTLE WHATLEY ([id number])- Patient is a 84y old  Female who presents with a chief complaint of malaise    INTERVAL HPI/OVERNIGHT EVENTS:   Patient admitted overnight for management of urosepsis    SUBJECTIVE: Pt seen at bedside; denies n/v, sob, chest pain.     PHYSICAL EXAM:  - GENERAL: Follows conversation appropriately. No acute distress.  - EYES: Tracks me in room.   - HENT: Moist mucous membranes. No scleral icterus.   - LUNGS: Clear to auscultation bilaterally. No accessory muscle use.  - CARDIOVASCULAR: Regular rate and rhythm. No murmur.  - ABDOMEN: Soft, non-tender and non-distended. No palpable masses.  - EXTREMITIES: No edema. Non-tender.  - SKIN: No rashes or lesions. Warm.  - NEUROLOGIC: No focal neurological deficits. CN II-XII grossly intact, but not individually tested.  - PSYCHIATRIC: Cooperative. Appropriate mood and affect.    Vital Signs Last 24 Hrs  T(C): 36.5 (28 Mar 2025 04:29), Max: 38.6 (27 Mar 2025 21:30)  T(F): 97.7 (28 Mar 2025 04:29), Max: 101.5 (27 Mar 2025 21:30)  HR: 78 (28 Mar 2025 04:29) (78 - 100)  BP: 121/54 (28 Mar 2025 04:29) (121/54 - 136/60)  BP(mean): --  RR: 18 (28 Mar 2025 04:29) (15 - 18)  SpO2: 99% (28 Mar 2025 04:29) (97% - 99%)    Parameters below as of 28 Mar 2025 04:29  Patient On (Oxygen Delivery Method): room air        LABS:    Urinalysis with Rflx Culture (collected 25 @ 22:50)                            13.3   16.97 )-----------( 237      ( 27 Mar 2025 21:30 )             41.3         137  |  101  |  25[H]  ----------------------------<  163[H]  4.8   |  18[L]  |  0.81    Ca    9.7      27 Mar 2025 21:30    TPro  7.1  /  Alb  4.3  /  TBili  0.8  /  DBili  x   /  AST  25  /  ALT  14  /  AlkPhos  75  03-          Urinalysis Basic - ( 27 Mar 2025 22:50 )    Color: Yellow / Appearance: Clear / S.017 / pH: x  Gluc: x / Ketone: Trace mg/dL  / Bili: Negative / Urobili: 0.2 mg/dL   Blood: x / Protein: 30 mg/dL / Nitrite: Positive   Leuk Esterase: Trace / RBC: 1 /HPF / WBC 5 /HPF   Sq Epi: x / Non Sq Epi: 1 /HPF / Bacteria: Many /HPF      PT/INR - ( 27 Mar 2025 21:30 )   PT: 11.9 sec;   INR: 1.03 ratio         PTT - ( 27 Mar 2025 21:30 )  PTT:29.5 sec  Lactate Trend        CAPILLARY BLOOD GLUCOSE      POCT Blood Glucose.: 129 mg/dL (28 Mar 2025 03:48)      Medications:  acetaminophen     Tablet .. 650 milliGRAM(s) Oral every 6 hours PRN  amLODIPine   Tablet 2.5 milliGRAM(s) Oral daily  atorvastatin 10 milliGRAM(s) Oral at bedtime  chlorhexidine 2% Cloths 1 Application(s) Topical <User Schedule>  dextrose 5%. 1000 milliLiter(s) IV Continuous <Continuous>  dextrose 5%. 1000 milliLiter(s) IV Continuous <Continuous>  dextrose 50% Injectable 25 Gram(s) IV Push once  dextrose 50% Injectable 12.5 Gram(s) IV Push once  dextrose 50% Injectable 25 Gram(s) IV Push once  dextrose Oral Gel 15 Gram(s) Oral once PRN  glucagon  Injectable 1 milliGRAM(s) IntraMuscular once  levothyroxine 37.5 MICROGram(s) Oral daily  melatonin 3 milliGRAM(s) Oral at bedtime PRN  pantoprazole    Tablet 40 milliGRAM(s) Oral before breakfast  piperacillin/tazobactam IVPB.. 3.375 Gram(s) IV Intermittent every 8 hours  predniSONE   Tablet 5 milliGRAM(s) Oral daily  tacrolimus    0.5 mG/mL Suspension 1.5 milliGRAM(s) Oral two times a day      RADIOLOGY & ADDITIONAL TESTS were viewed by me personally.   EKG:    KRYSTLE WHATLEY ([id number])- Patient is a 84y old  Female who presents with a chief complaint of malaise    INTERVAL HPI/OVERNIGHT EVENTS:   Patient admitted overnight for management of urosepsis    SUBJECTIVE: Pt seen at bedside; denies n/v, sob, chest pain.     PHYSICAL EXAM:  - GENERAL: Follows conversation appropriately. No acute distress.  - EYES: Tracks me in room.   - HENT: Moist mucous membranes. No scleral icterus.   - LUNGS: Clear to auscultation bilaterally. No accessory muscle use.  - CARDIOVASCULAR: Regular rate and rhythm. No murmur.  - ABDOMEN: Soft, non-tender and non-distended. No palpable masses.  - EXTREMITIES: No edema. Non-tender.  - SKIN: No rashes or lesions. Warm. Varicose veins b/l LEs  - NEUROLOGIC: Mild R eyelid droop, chronic, no other deficits appreciated  - PSYCHIATRIC: Cooperative. Appropriate mood and affect.    Vital Signs Last 24 Hrs  T(C): 36.5 (28 Mar 2025 04:29), Max: 38.6 (27 Mar 2025 21:30)  T(F): 97.7 (28 Mar 2025 04:29), Max: 101.5 (27 Mar 2025 21:30)  HR: 78 (28 Mar 2025 04:29) (78 - 100)  BP: 121/54 (28 Mar 2025 04:29) (121/54 - 136/60)  BP(mean): --  RR: 18 (28 Mar 2025 04:29) (15 - 18)  SpO2: 99% (28 Mar 2025 04:29) (97% - 99%)    Parameters below as of 28 Mar 2025 04:29  Patient On (Oxygen Delivery Method): room air        LABS:    Urinalysis with Rflx Culture (collected 25 @ 22:50)                            13.3   16.97 )-----------( 237      ( 27 Mar 2025 21:30 )             41.3         137  |  101  |  25[H]  ----------------------------<  163[H]  4.8   |  18[L]  |  0.81    Ca    9.7      27 Mar 2025 21:30    TPro  7.1  /  Alb  4.3  /  TBili  0.8  /  DBili  x   /  AST  25  /  ALT  14  /  AlkPhos  75  -          Urinalysis Basic - ( 27 Mar 2025 22:50 )    Color: Yellow / Appearance: Clear / S.017 / pH: x  Gluc: x / Ketone: Trace mg/dL  / Bili: Negative / Urobili: 0.2 mg/dL   Blood: x / Protein: 30 mg/dL / Nitrite: Positive   Leuk Esterase: Trace / RBC: 1 /HPF / WBC 5 /HPF   Sq Epi: x / Non Sq Epi: 1 /HPF / Bacteria: Many /HPF      PT/INR - ( 27 Mar 2025 21:30 )   PT: 11.9 sec;   INR: 1.03 ratio         PTT - ( 27 Mar 2025 21:30 )  PTT:29.5 sec  Lactate Trend        CAPILLARY BLOOD GLUCOSE      POCT Blood Glucose.: 129 mg/dL (28 Mar 2025 03:48)      Medications:  acetaminophen     Tablet .. 650 milliGRAM(s) Oral every 6 hours PRN  amLODIPine   Tablet 2.5 milliGRAM(s) Oral daily  atorvastatin 10 milliGRAM(s) Oral at bedtime  chlorhexidine 2% Cloths 1 Application(s) Topical <User Schedule>  dextrose 5%. 1000 milliLiter(s) IV Continuous <Continuous>  dextrose 5%. 1000 milliLiter(s) IV Continuous <Continuous>  dextrose 50% Injectable 25 Gram(s) IV Push once  dextrose 50% Injectable 12.5 Gram(s) IV Push once  dextrose 50% Injectable 25 Gram(s) IV Push once  dextrose Oral Gel 15 Gram(s) Oral once PRN  glucagon  Injectable 1 milliGRAM(s) IntraMuscular once  levothyroxine 37.5 MICROGram(s) Oral daily  melatonin 3 milliGRAM(s) Oral at bedtime PRN  pantoprazole    Tablet 40 milliGRAM(s) Oral before breakfast  piperacillin/tazobactam IVPB.. 3.375 Gram(s) IV Intermittent every 8 hours  predniSONE   Tablet 5 milliGRAM(s) Oral daily  tacrolimus    0.5 mG/mL Suspension 1.5 milliGRAM(s) Oral two times a day      RADIOLOGY & ADDITIONAL TESTS were viewed by me personally.   EKG:

## 2025-03-28 NOTE — H&P ADULT - NSHPLABSRESULTS_GEN_ALL_CORE
13.3   16.97 )-----------( 237      ( 27 Mar 2025 21:30 )             41.3         137  |  101  |  25[H]  ----------------------------<  163[H]  4.8   |  18[L]  |  0.81    Ca    9.7      27 Mar 2025 21:30    TPro  7.1  /  Alb  4.3  /  TBili  0.8  /  DBili  x   /  AST  25  /  ALT  14  /  AlkPhos  75          Urinalysis Basic - ( 27 Mar 2025 22:50 )    Color: Yellow / Appearance: Clear / S.017 / pH: x  Gluc: x / Ketone: Trace mg/dL  / Bili: Negative / Urobili: 0.2 mg/dL   Blood: x / Protein: 30 mg/dL / Nitrite: Positive   Leuk Esterase: Trace / RBC: 1 /HPF / WBC 5 /HPF   Sq Epi: x / Non Sq Epi: 1 /HPF / Bacteria: Many /HPF    PT/INR - ( 27 Mar 2025 21:30 )   PT: 11.9 sec;   INR: 1.03 ratio    PTT - ( 27 Mar 2025 21:30 )  PTT:29.5 sec    CAPILLARY BLOOD GLUCOSE  POCT Blood Glucose.: 129 mg/dL (28 Mar 2025 03:48)    EKG:     Imaging:   < from: CT Abdomen and Pelvis w/ IV Cont (25 @ 00:39) >    FINDINGS:  LOWER CHEST: Mild bibasilar dependent atelectasis and platelike   atelectasis in the right middle lobe. Coronary artery and aortic   calcifications.    LIVER: Left hepatic cyst and additional subcentimeter hypodense foci too   small to characterize. Mild periportal edema.  BILE DUCTS: Normal caliber.  GALLBLADDER: Distended.  SPLEEN: Within normal limits.  PANCREAS: Within normal limits.  ADRENALS: Within normal limits.  KIDNEYS/URETERS: Bilateral atrophic native kidneys. Right iliac fossa   renal transplant. Normal enhancement of the right renal transplant. No   hydroureteronephrosis. Cyst in the renal transplant measuring up to 2.1   cm.    BLADDER: Distended.  REPRODUCTIVE ORGANS: A 9.3 x 6.9 x 7.7 cm (AP by TV by CC) cystic   structure in the midline pelvis superior to the uterus with clawing like   tissue along the right border (602, 35), likely right ovarian in origin.   Left adnexa is unremarkable.    BOWEL: No bowel obstruction. Appendix is not visualized. Colonic   diverticulosis without evidence of diverticulitis. Thickening of the   wallsof the distal transverse colon, descending colon, and sigmoid colon.  PERITONEUM/RETROPERITONEUM: No ascites, pneumoperitoneum, or loculated   collection.  VESSELS: Atherosclerotic calcifications of the aortoiliac tree and   abdominal aortic branches. Normal caliber abdominal aorta.  LYMPH NODES: No lymphadenopathy.  ABDOMINAL WALL: Tiny fat-containing umbilical hernia.  BONES: Degenerative changes of the spine. Grade 1 anterolisthesis of L5   on S1 due to degenerated facets.    IMPRESSION:  Colitis involving the distal transverse colon, descending colon, and   sigmoid colon.    Colonic diverticulosis without evidence of diverticulitis.    Simple appearing cystic structure measuring 9.3 x 6.9 x 7.7 cm which   appears to rise from the right adnexa/ovary. Pelvic ultrasound is   recommended for further evaluation.    < from: Xray Chest 2 Views PA/Lat (25 @ 00:52) >    FINDINGS:  The heart is normal in size.  The lungs are clear.  There is no pneumothorax or pleural effusion.    IMPRESSION:  Clear lungs. LABS and ADDITIONAL STUDIES:                  13.3   16.97 )-----------( 237      ( 27 Mar 2025 21:30 )                41.3   Complete Blood Count + Automated Diff (25 @ 21:30)   Neutrophil #: 16.39 K/uL  Neutrophil %: 96.6 %        137  |  101  |  25[H]  ----------------------------<  163[H]  4.8   |  18[L]  |  0.81    Ca    9.7      27 Mar 2025 21:30    TPro  7.1  /  Alb  4.3  /  TBili  0.8  /  DBili  x   /  AST  25  /  ALT  14  /  AlkPhos  75          Lipase 18    Troponin 9    Urinalysis Basic - ( 27 Mar 2025 22:50 )  Color: Yellow / Appearance: Clear / S.017 / pH: x  Gluc: x / Ketone: Trace mg/dL  / Bili: Negative / Urobili: 0.2 mg/dL   Blood: x / Protein: 30 mg/dL / Nitrite: Positive   Leuk Esterase: Trace / RBC: 1 /HPF / WBC 5 /HPF   Sq Epi: x / Non Sq Epi: 1 /HPF / Bacteria: Many /HPF    COVID/Flu/RSV - neg    PT/INR - ( 27 Mar 2025 21:30 )   PT: 11.9 sec;   INR: 1.03 ratio    PTT - ( 27 Mar 2025 21:30 )  PTT:29.5 sec    CAPILLARY BLOOD GLUCOSE  POCT Blood Glucose.: 129 mg/dL (28 Mar 2025 03:48)    VBG - 7.39/38/52/23, Lactate 2.0    Imaging:   < from: CT Abdomen and Pelvis w/ IV Cont (25 @ 00:39) >    FINDINGS:  LOWER CHEST: Mild bibasilar dependent atelectasis and platelike   atelectasis in the right middle lobe. Coronary artery and aortic   calcifications.    LIVER: Left hepatic cyst and additional subcentimeter hypodense foci too   small to characterize. Mild periportal edema.  BILE DUCTS: Normal caliber.  GALLBLADDER: Distended.  SPLEEN: Within normal limits.  PANCREAS: Within normal limits.  ADRENALS: Within normal limits.  KIDNEYS/URETERS: Bilateral atrophic native kidneys. Right iliac fossa   renal transplant. Normal enhancement of the right renal transplant. No   hydroureteronephrosis. Cyst in the renal transplant measuring up to 2.1   cm.    BLADDER: Distended.  REPRODUCTIVE ORGANS: A 9.3 x 6.9 x 7.7 cm (AP by TV by CC) cystic   structure in the midline pelvis superior to the uterus with clawing like   tissue along the right border (602, 35), likely right ovarian in origin.   Left adnexa is unremarkable.    BOWEL: No bowel obstruction. Appendix is not visualized. Colonic   diverticulosis without evidence of diverticulitis. Thickening of the   wallsof the distal transverse colon, descending colon, and sigmoid colon.  PERITONEUM/RETROPERITONEUM: No ascites, pneumoperitoneum, or loculated   collection.  VESSELS: Atherosclerotic calcifications of the aortoiliac tree and   abdominal aortic branches. Normal caliber abdominal aorta.  LYMPH NODES: No lymphadenopathy.  ABDOMINAL WALL: Tiny fat-containing umbilical hernia.  BONES: Degenerative changes of the spine. Grade 1 anterolisthesis of L5   on S1 due to degenerated facets.    IMPRESSION:  Colitis involving the distal transverse colon, descending colon, and   sigmoid colon.    Colonic diverticulosis without evidence of diverticulitis.    Simple appearing cystic structure measuring 9.3 x 6.9 x 7.7 cm which   appears to rise from the right adnexa/ovary. Pelvic ultrasound is   recommended for further evaluation.    < from: Xray Chest 2 Views PA/Lat (25 @ 00:52) >    FINDINGS:  The heart is normal in size.  The lungs are clear.  There is no pneumothorax or pleural effusion.    IMPRESSION:  Clear lungs.

## 2025-03-28 NOTE — CONSULT NOTE ADULT - PROBLEM SELECTOR RECOMMENDATION 9
Allograft with good function. Scr wnl 0.79 today. Electrolytes within acceptable range. Tacrolimus lvl therapeutic. UA w/ proteinuria and w/o hematuria. CT abd/pelvis w/ IV contrast w/o transplant hydro. IS as below.  Monitor labs, VS and urine output. Avoid nephrotoxins. Dose medications as per eGFR.

## 2025-03-28 NOTE — H&P ADULT - PROBLEM SELECTOR PLAN 8
CT A/P w/ incidental finding of simple appearing cystic structure measuring 9.3 x 6.9 x 7.7 cm. which appears to rise from the right adnexa/ovary.     - pelvic U/S outpatient CT A/P w/ incidental finding of simple appearing cystic structure measuring 9.3 x 6.9 x 7.7 cm. which appears to rise from the right adnexa/ovary.     - discussed w/ family, family aware of ovarian cyst. will f/u pelvic U/S outpatient

## 2025-03-28 NOTE — H&P ADULT - PROBLEM SELECTOR PLAN 2
CT A/P w/ colitis involving the distal transverse colon, descending colon, and   sigmoid colon. CT A/P w/ colitis involving the distal transverse colon, descending colon, and sigmoid colon.    - abx as above  - GI PCR, stool culture if persistent diarrhea  - consider C diff if mets criteria given recent abx use

## 2025-03-28 NOTE — PATIENT PROFILE ADULT - FALL HARM RISK - FACTORS NURSING JUDGEMENT
It was a pleasure seeing you today and thank you for allowing me to be a part of your health care team.  Should   you have any questions regarding your visit or future needs please feel free to reach out to my care team for assistance.      Thank you, Dr. Talat Medrano        **Nursing: [phone]       **Scheduling: [phone]   
Yes

## 2025-03-28 NOTE — CONSULT NOTE ADULT - PROBLEM SELECTOR RECOMMENDATION 2
Currently on immunosuppression (oral tacrolimus 1.5mg BID, Myfortic 720 mg BID and prednisone 5 mg PO qd) for kidney transplant. Tacrolimus lvl therapeutic. Recommend continuing home tacro and prednisone. Check serum tacrolimus level (trough) 30 mins prior to am dose. Pt w/ urosepsis, recommend holding Myfortic for now until bld cultures negative. Pt also w/ colitis, would check serum CMV. Urosepsis management as per primary team.

## 2025-03-28 NOTE — ED ADULT NURSE REASSESSMENT NOTE - NS ED NURSE REASSESS COMMENT FT1
Break coverage RN: Pt received from IRIS Rey resting comfortably in bed. Respirations even and unlabored. NSR noted on cardiac monitor. Pt reports improvement in condition. VSS. No signs of distress noted. Pending CT. Will continue to monitor.

## 2025-03-28 NOTE — H&P ADULT - PROBLEM SELECTOR PLAN 3
U/A w/ trace LE, positive nitrite, many bacteria, 5WBC.   No prior cx in HIE.     - continue abx as above   - f/u urine cx U/A w/ trace LE, positive nitrite, many bacteria, 5WBC.   Hx of recurrent UTI, prior cultures growing Ecoli per family.     - continue abx as above   - f/u urine cx  - hold methenamine hippurate at this time

## 2025-03-28 NOTE — PATIENT PROFILE ADULT - WHEN WAS YOUR LAST VACCINATION? YEAR
Vital Signs: I have reviewed the initial vital signs.  Constitutional: well-nourished, appears stated age, no acute distress  Cardiovascular: regular rate, regular rhythm, well-perfused extremities  Respiratory: unlabored respiratory effort, clear to auscultation bilaterally  Gastrointestinal: soft, non-tender abdomen  Musculoskeletal: supple neck, no lower extremity edema  Integumentary: warm, dry, no rash  Neurologic: awake, alert, cranial nerves II-XII grossly intact, extremities’ motor and sensory functions grossly intact. NIHSS 0.   Psychiatric: appropriate mood, appropriate affect 2024

## 2025-03-28 NOTE — PHYSICAL THERAPY INITIAL EVALUATION ADULT - PERTINENT HX OF CURRENT PROBLEM, REHAB EVAL
As per chart review, "This is an 83 y/o F with PMHx HTN, T2DM, hypothyroidism, ESRD s/p renal transplant (2011), recurrent UTIs, T2DM and HLD who presented to the ED for generalized malaise x 2-3 days, admitted for sepsis 2/2 colitis and UTI.   "

## 2025-03-28 NOTE — PROGRESS NOTE ADULT - PROBLEM SELECTOR PLAN 4
Hx renal transplant in 2011  Follows w/Dr. Musa Urbina at [facility]  Home meds: tacrolimus 1.5mg BID, prednisone 5mg qd, myfortic 720mg BID     - continue home tac 1.5mg BID   - check tac level in AM   - continue prednisone 5mg  - hold myfortic in setting of active infection   - notify Dr. Urbina of patient adm in AM Hx renal transplant in 2011  Follows w/Dr. Musa Urbina at [facility]  Home meds: tacrolimus 1.5mg BID, prednisone 5mg qd, myfortic 720mg BID     - continue home tac 1.5mg BID   - check tac level in AM   - continue prednisone 5mg  - hold myfortic in setting of active infection   - notify Dr. Urbina of patient adm in AM        > Team does not come to hosp        > Spoke with Dr Urbina's team, rec hold MMF, tacro 3-5, cw prednisone

## 2025-03-28 NOTE — H&P ADULT - PROBLEM SELECTOR PLAN 6
A1c    - LOBO premeal and bedtime   - CC diet Home regimen: Metformin 750mg qd but pt does not take it daily.     - check A1c   - LOBO premeal and bedtime   - CC diet

## 2025-03-28 NOTE — H&P ADULT - NSICDXPASTMEDICALHX_GEN_ALL_CORE_FT
PAST MEDICAL HISTORY:  Hypertension     Hypothyroidism     Renal transplant recipient     Type 2 diabetes mellitus

## 2025-03-28 NOTE — PHYSICAL THERAPY INITIAL EVALUATION ADULT - ADDITIONAL COMMENTS
Pt states she lives alone in an apartment. Pt states she walks about 3 miles everyday. Prior to admission, pt was ambulating independently without any assistive devices.  Post PT evaluation, pt left with head of bed elevated to 30 degrees, alarm on, call bell and remote within reach, all precautions maintained, NAD. RN aware.

## 2025-03-28 NOTE — H&P ADULT - ASSESSMENT
This is an 85 y/o F with PMHx HTN, T2DM, hypothyroidism, ESRD s/p renal transplant (2011), recurrent UTIs, T2DM and HLD who presented to the ED for generalized malaise x 2-3 days, admitted for sepsis 2/2 colitis and UTI.

## 2025-03-28 NOTE — H&P ADULT - PROBLEM SELECTOR PLAN 5
- continue Home meds: enalapril 10mg qd, amlodipine 2.5mg, metoprolol succinate 50mg qd     - continue amlodipine 2.5mg qd  - hold enalapril and metoprolol in setting of sepsis, normotension.

## 2025-03-28 NOTE — PATIENT PROFILE ADULT - FALL HARM RISK - HARM RISK INTERVENTIONS

## 2025-03-29 LAB
ADD ON TEST-SPECIMEN IN LAB: SIGNIFICANT CHANGE UP
ALBUMIN SERPL ELPH-MCNC: 3.7 G/DL — SIGNIFICANT CHANGE UP (ref 3.3–5)
ALP SERPL-CCNC: 63 U/L — SIGNIFICANT CHANGE UP (ref 40–120)
ALT FLD-CCNC: 12 U/L — SIGNIFICANT CHANGE UP (ref 4–33)
ANION GAP SERPL CALC-SCNC: 11 MMOL/L — SIGNIFICANT CHANGE UP (ref 7–14)
AST SERPL-CCNC: 18 U/L — SIGNIFICANT CHANGE UP (ref 4–32)
BASOPHILS # BLD AUTO: 0.06 K/UL — SIGNIFICANT CHANGE UP (ref 0–0.2)
BASOPHILS NFR BLD AUTO: 0.8 % — SIGNIFICANT CHANGE UP (ref 0–2)
BILIRUB SERPL-MCNC: 0.5 MG/DL — SIGNIFICANT CHANGE UP (ref 0.2–1.2)
BUN SERPL-MCNC: 16 MG/DL — SIGNIFICANT CHANGE UP (ref 7–23)
CALCIUM SERPL-MCNC: 9.3 MG/DL — SIGNIFICANT CHANGE UP (ref 8.4–10.5)
CHLORIDE SERPL-SCNC: 105 MMOL/L — SIGNIFICANT CHANGE UP (ref 98–107)
CO2 SERPL-SCNC: 22 MMOL/L — SIGNIFICANT CHANGE UP (ref 22–31)
CREAT SERPL-MCNC: 0.82 MG/DL — SIGNIFICANT CHANGE UP (ref 0.5–1.3)
CULTURE RESULTS: SIGNIFICANT CHANGE UP
EGFR: 70 ML/MIN/1.73M2 — SIGNIFICANT CHANGE UP
EGFR: 70 ML/MIN/1.73M2 — SIGNIFICANT CHANGE UP
EOSINOPHIL # BLD AUTO: 0.04 K/UL — SIGNIFICANT CHANGE UP (ref 0–0.5)
EOSINOPHIL NFR BLD AUTO: 0.5 % — SIGNIFICANT CHANGE UP (ref 0–6)
GLUCOSE BLDC GLUCOMTR-MCNC: 116 MG/DL — HIGH (ref 70–99)
GLUCOSE BLDC GLUCOMTR-MCNC: 145 MG/DL — HIGH (ref 70–99)
GLUCOSE BLDC GLUCOMTR-MCNC: 156 MG/DL — HIGH (ref 70–99)
GLUCOSE BLDC GLUCOMTR-MCNC: 171 MG/DL — HIGH (ref 70–99)
GLUCOSE SERPL-MCNC: 125 MG/DL — HIGH (ref 70–99)
HCT VFR BLD CALC: 35.8 % — SIGNIFICANT CHANGE UP (ref 34.5–45)
HGB BLD-MCNC: 11.6 G/DL — SIGNIFICANT CHANGE UP (ref 11.5–15.5)
IANC: 5.38 K/UL — SIGNIFICANT CHANGE UP (ref 1.8–7.4)
IMM GRANULOCYTES NFR BLD AUTO: 0.4 % — SIGNIFICANT CHANGE UP (ref 0–0.9)
LYMPHOCYTES # BLD AUTO: 1.45 K/UL — SIGNIFICANT CHANGE UP (ref 1–3.3)
LYMPHOCYTES # BLD AUTO: 18.9 % — SIGNIFICANT CHANGE UP (ref 13–44)
MAGNESIUM SERPL-MCNC: 1.9 MG/DL — SIGNIFICANT CHANGE UP (ref 1.6–2.6)
MCHC RBC-ENTMCNC: 28.6 PG — SIGNIFICANT CHANGE UP (ref 27–34)
MCHC RBC-ENTMCNC: 32.4 G/DL — SIGNIFICANT CHANGE UP (ref 32–36)
MCV RBC AUTO: 88.2 FL — SIGNIFICANT CHANGE UP (ref 80–100)
MONOCYTES # BLD AUTO: 0.73 K/UL — SIGNIFICANT CHANGE UP (ref 0–0.9)
MONOCYTES NFR BLD AUTO: 9.5 % — SIGNIFICANT CHANGE UP (ref 2–14)
MRSA PCR RESULT.: SIGNIFICANT CHANGE UP
NEUTROPHILS # BLD AUTO: 5.38 K/UL — SIGNIFICANT CHANGE UP (ref 1.8–7.4)
NEUTROPHILS NFR BLD AUTO: 69.9 % — SIGNIFICANT CHANGE UP (ref 43–77)
NRBC # BLD AUTO: 0 K/UL — SIGNIFICANT CHANGE UP (ref 0–0)
NRBC # FLD: 0 K/UL — SIGNIFICANT CHANGE UP (ref 0–0)
NRBC BLD AUTO-RTO: 0 /100 WBCS — SIGNIFICANT CHANGE UP (ref 0–0)
PHOSPHATE SERPL-MCNC: 3 MG/DL — SIGNIFICANT CHANGE UP (ref 2.5–4.5)
PLATELET # BLD AUTO: 213 K/UL — SIGNIFICANT CHANGE UP (ref 150–400)
POTASSIUM SERPL-MCNC: 4.3 MMOL/L — SIGNIFICANT CHANGE UP (ref 3.5–5.3)
POTASSIUM SERPL-SCNC: 4.3 MMOL/L — SIGNIFICANT CHANGE UP (ref 3.5–5.3)
PROT SERPL-MCNC: 6.1 G/DL — SIGNIFICANT CHANGE UP (ref 6–8.3)
RBC # BLD: 4.06 M/UL — SIGNIFICANT CHANGE UP (ref 3.8–5.2)
RBC # FLD: 14.3 % — SIGNIFICANT CHANGE UP (ref 10.3–14.5)
S AUREUS DNA NOSE QL NAA+PROBE: SIGNIFICANT CHANGE UP
SODIUM SERPL-SCNC: 138 MMOL/L — SIGNIFICANT CHANGE UP (ref 135–145)
SPECIMEN SOURCE: SIGNIFICANT CHANGE UP
TACROLIMUS SERPL-MCNC: 11.5 NG/ML — SIGNIFICANT CHANGE UP
WBC # BLD: 7.69 K/UL — SIGNIFICANT CHANGE UP (ref 3.8–10.5)
WBC # FLD AUTO: 7.69 K/UL — SIGNIFICANT CHANGE UP (ref 3.8–10.5)

## 2025-03-29 PROCEDURE — 99233 SBSQ HOSP IP/OBS HIGH 50: CPT

## 2025-03-29 RX ORDER — MYCOPHENOLIC ACID 360 MG/1
2 TABLET, DELAYED RELEASE ORAL
Refills: 0 | DISCHARGE

## 2025-03-29 RX ORDER — B1/B2/B3/B5/B6/B12/VIT C/FOLIC 500-0.5 MG
1 TABLET ORAL
Qty: 0 | Refills: 0 | DISCHARGE
Start: 2025-03-29

## 2025-03-29 RX ADMIN — Medication 40 MILLIGRAM(S): at 05:48

## 2025-03-29 RX ADMIN — Medication 37.5 MICROGRAM(S): at 04:01

## 2025-03-29 RX ADMIN — Medication 1 APPLICATION(S): at 05:48

## 2025-03-29 RX ADMIN — Medication 25 GRAM(S): at 05:48

## 2025-03-29 RX ADMIN — Medication 25 GRAM(S): at 21:46

## 2025-03-29 RX ADMIN — ATORVASTATIN CALCIUM 10 MILLIGRAM(S): 80 TABLET, FILM COATED ORAL at 21:45

## 2025-03-29 RX ADMIN — TACROLIMUS 1.5 MILLIGRAM(S): 0.5 CAPSULE ORAL at 19:41

## 2025-03-29 RX ADMIN — PREDNISONE 5 MILLIGRAM(S): 20 TABLET ORAL at 05:48

## 2025-03-29 RX ADMIN — Medication 1 TABLET(S): at 11:33

## 2025-03-29 RX ADMIN — Medication 25 GRAM(S): at 14:28

## 2025-03-29 RX ADMIN — TACROLIMUS 1.5 MILLIGRAM(S): 0.5 CAPSULE ORAL at 08:51

## 2025-03-29 NOTE — PROGRESS NOTE ADULT - PROBLEM SELECTOR PLAN 4
Hx renal transplant in 2011  Follows w/Dr. Musa Urbina at [facility]  Home meds: tacrolimus 1.5mg BID, prednisone 5mg qd, myfortic 720mg BID     - continue home tac 1.5mg BID   - check tac level in AM   - continue prednisone 5mg  - hold myfortic in setting of active infection   - notify Dr. Urbina of patient adm in AM        > Team does not come to hosp        > Spoke with Dr Urbina's team, rec hold MMF, tacro 3-5, cw prednisone        > transplant nephro following

## 2025-03-29 NOTE — DIETITIAN INITIAL EVALUATION ADULT - PROBLEM SELECTOR PLAN 5
Home meds: enalapril 10mg qd, amlodipine 2.5mg, metoprolol succinate 50mg qd     - continue amlodipine 2.5mg qd  - hold enalapril and metoprolol in setting of sepsis, normotension.

## 2025-03-29 NOTE — PROGRESS NOTE ADULT - PROBLEM SELECTOR PLAN 2
CT A/P w/ colitis involving the distal transverse colon, descending colon, and sigmoid colon.    - abx as above  - GI PCR, stool culture if persistent diarrhea  - consider C diff if meets criteria given recent abx use        > Cefpodoxime in January CT A/P w/ colitis involving the distal transverse colon, descending colon, and sigmoid colon.    - abx as above  - GI PCR, stool culture if persistent diarrhea  - consider C diff if meets criteria given recent abx use        > Cefpodoxime in January  > fu CMV

## 2025-03-29 NOTE — DIETITIAN INITIAL EVALUATION ADULT - PROBLEM SELECTOR PLAN 4
Hx renal transplant in 2011  Follows w/Dr. Musa Urbina at [facility]  Home meds: tacrolimus 1.5mg BID, prednisone 5mg qd, myfortic 720mg BID     - continue home tac 1.5mg BID   - check tac level in AM   - continue prednisone 5mg  - hold myfortic in setting of active infection   - notify Dr. Urbina of patient adm in AM

## 2025-03-29 NOTE — DISCHARGE NOTE PROVIDER - NSDCFUADDAPPT_GEN_ALL_CORE_FT
APPTS ARE READY TO BE MADE: [ ] YES    Best Family or Patient Contact (if needed):    Additional Information about above appointments (if needed):    1: Geriatrician for establishment of PCP  2: Dr Urbina for management of immunosuppressive regimen  3: GI for colitis follow up  4: Gyn for follow up of ovarian cyst    Other comments or requests:    APPTS ARE READY TO BE MADE: [X] YES    Best Family or Patient Contact (if needed):    Additional Information about above appointments (if needed):    1: Geriatrician for establishment of PCP  2: Dr Urbina for management of immunosuppressive regimen  3: GI for colitis follow up  4: Gyn for follow up of ovarian cyst    Other comments or requests:

## 2025-03-29 NOTE — DIETITIAN INITIAL EVALUATION ADULT - PROBLEM SELECTOR PLAN 6
Home regimen: Metformin 750mg qd but pt does not take it daily.     - check A1c   - LOBO premeal and bedtime   - CC diet

## 2025-03-29 NOTE — DISCHARGE NOTE PROVIDER - NSDCCPTREATMENT_GEN_ALL_CORE_FT
PRINCIPAL PROCEDURE  Procedure: CT abdomen  Findings and Treatment: IMPRESSION:  Colitis involving the distal transverse colon, descending colon, and   sigmoid colon.  Colonic diverticulosis without evidence of diverticulitis.  Simple appearing cystic structure measuring 9.3 x 6.9 x 7.7 cm which   appears to rise from the right adnexa/ovary. Pelvic ultrasound is   recommended for further evaluation.

## 2025-03-29 NOTE — DIETITIAN INITIAL EVALUATION ADULT - ADD RECOMMEND
- Continue diet as ordered  - Continue w/ multivitamin  - Monitor PO intake, tolerance to diet/supplement, nutrition related lab values, weight trends, BMs/GI distress, hydration status, skin integrity

## 2025-03-29 NOTE — DIETITIAN INITIAL EVALUATION ADULT - ORAL INTAKE PTA/DIET HISTORY
Patient seen for assessment. Reports good appetite at home. Monitors intake of sugars and takes Metformin (not consistently though). A1c is 7.2% per chart.

## 2025-03-29 NOTE — DISCHARGE NOTE PROVIDER - NSFOLLOWUPCLINICS_GEN_ALL_ED_FT
A Gastroenterologist  Gastroenterology  .  [place]   Phone:   Fax:   Follow Up Time: 1 month    [facility] Geriatric and Palliative Care  Geriatrics  [address]  [place] [address]  Phone: [phone]  Fax:   Follow Up Time: 2 weeks    [facility] Gynecology and Obstetrics  Gynceology/OB  [address] [place] [address]  Phone: [phone]  Fax:   Follow Up Time: 1 month     A Gastroenterologist  Gastroenterology  .  [place]   Phone:   Fax:   Follow Up Time: 1 month    [facility] Geriatric and Palliative Care  Geriatrics  [address]  [place] [address]  Phone: [phone]  Fax:   Follow Up Time: 2 weeks    [facility] Gynecology and Obstetrics  Gynceology/OB  [address] [place] [address]  Phone: [phone]  Fax:   Follow Up Time: 1 month    [facility] General Internal Medicine  General Internal Medicine  [address] [place] [address]  Phone: [phone]  Fax:   Follow Up Time: 1 month     [facility] General Internal Medicine  General Internal Medicine  [address] [place] [address]  Phone: [phone]  Fax:   Follow Up Time: 1 month    [facility] Geriatric and Palliative Care  Geriatrics  [address]  [place] [address]  Phone: [phone]  Fax:   Follow Up Time: 2 weeks    [facility] Gynecology and Obstetrics  Gynceology/OB  [address] [place] [address]  Phone: [phone]  Fax:   Follow Up Time: 1 month    [facility]  Gastroenterology  [address] [place] [address]  Phone: [phone]  Fax:   Follow Up Time: 1 week

## 2025-03-29 NOTE — PROGRESS NOTE ADULT - SUBJECTIVE AND OBJECTIVE BOX
KRYSTLE WHATLEY ([id number])- Patient is a 84y old  Female who presents with a chief complaint of malaise    INTERVAL HPI/OVERNIGHT EVENTS:   No acute overnight events    SUBJECTIVE: Pt seen at bedside; denies n/v, sob, chest pain.     PHYSICAL EXAM:  - GENERAL: Follows conversation appropriately. No acute distress.  - EYES: Tracks me in room.   - HENT: Moist mucous membranes. No scleral icterus.   - LUNGS: Clear to auscultation bilaterally. No accessory muscle use.  - CARDIOVASCULAR: Regular rate and rhythm. No murmur.  - ABDOMEN: Soft, non-tender and non-distended. No palpable masses.  - EXTREMITIES: No edema. Non-tender.  - SKIN: No rashes or lesions. Warm. Varicose veins b/l LEs  - NEUROLOGIC: Mild R eyelid droop, chronic, no other deficits appreciated  - PSYCHIATRIC: Cooperative. Appropriate mood and affect.    VS  T(C): 36.3 (03-29-25 @ 05:31), Max: 36.6 (03-28-25 @ 12:33)  HR: 75 (03-29-25 @ 05:31) (70 - 75)  BP: 138/66 (03-29-25 @ 05:31) (120/75 - 145/66)  RR: 18 (03-29-25 @ 05:31) (17 - 19)  SpO2: 97% (03-29-25 @ 05:31) (96% - 100%)    LABS (available at time of writing 03-29-25 @ 07:15):                         12.1   11.12 )-----------( 218      ( 28 Mar 2025 07:35 )             37.2     03-28    140  |  107  |  15  ----------------------------<  129[H]  3.9   |  22  |  0.79    Ca    9.0      28 Mar 2025 07:35  Phos  3.4     03-28  Mg     1.50     03-28    TPro  7.1  /  Alb  4.3  /  TBili  0.8  /  DBili  x   /  AST  25  /  ALT  14  /  AlkPhos  75  03-27    PT/INR - ( 27 Mar 2025 21:30 )   PT: 11.9 sec;   INR: 1.03 ratio         PTT - ( 27 Mar 2025 21:30 )  PTT:29.5 sec  Urinalysis Basic - ( 28 Mar 2025 07:35 )    Color: x / Appearance: x / SG: x / pH: x  Gluc: 129 mg/dL / Ketone: x  / Bili: x / Urobili: x   Blood: x / Protein: x / Nitrite: x   Leuk Esterase: x / RBC: x / WBC x   Sq Epi: x / Non Sq Epi: x / Bacteria: x          Urinalysis with Rflx Culture (collected 03-27-25 @ 22:50)    Culture - Blood (collected 03-27-25 @ 21:30)  Source: Blood Blood-Peripheral  Preliminary Report (03-29-25 @ 03:02):    No growth at 24 hours    Culture - Blood (collected 03-27-25 @ 21:02)  Source: Blood Blood-Venous  Preliminary Report (03-29-25 @ 03:02):    No growth at 24 hours        Medications:   acetaminophen     Tablet .. 650 milliGRAM(s) Oral every 6 hours PRN  atorvastatin 10 milliGRAM(s) Oral at bedtime  chlorhexidine 2% Cloths 1 Application(s) Topical <User Schedule>  dextrose 5%. 1000 milliLiter(s) IV Continuous <Continuous>  dextrose 5%. 1000 milliLiter(s) IV Continuous <Continuous>  dextrose 50% Injectable 25 Gram(s) IV Push once  dextrose 50% Injectable 12.5 Gram(s) IV Push once  dextrose 50% Injectable 25 Gram(s) IV Push once  dextrose Oral Gel 15 Gram(s) Oral once PRN  enoxaparin Injectable 40 milliGRAM(s) SubCutaneous every 24 hours  glucagon  Injectable 1 milliGRAM(s) IntraMuscular once  insulin lispro (ADMELOG) corrective regimen sliding scale   SubCutaneous three times a day before meals  insulin lispro (ADMELOG) corrective regimen sliding scale   SubCutaneous at bedtime  levothyroxine 37.5 MICROGram(s) Oral daily  melatonin 3 milliGRAM(s) Oral at bedtime PRN  multivitamin 1 Tablet(s) Oral daily  pantoprazole    Tablet 40 milliGRAM(s) Oral before breakfast  piperacillin/tazobactam IVPB.. 3.375 Gram(s) IV Intermittent every 8 hours  predniSONE   Tablet 5 milliGRAM(s) Oral daily  sodium chloride 0.9%. 1000 milliLiter(s) IV Continuous <Continuous>  tacrolimus 1.5 milliGRAM(s) Oral two times a day      RADIOLOGY, EKG & ADDITIONAL TESTS: Reviewed.      KRYSTLE WHATLEY ([id number])- Patient is a 84y old  Female who presents with a chief complaint of malaise    INTERVAL HPI/OVERNIGHT EVENTS:   No acute overnight events    SUBJECTIVE: Pt seen at bedside; denies n/v, sob, chest pain. No episodes of diarrhea overnight.    PHYSICAL EXAM:  - GENERAL: Follows conversation appropriately. No acute distress.  - EYES: Tracks me in room.   - HENT: Moist mucous membranes. No scleral icterus.   - LUNGS: Clear to auscultation bilaterally. No accessory muscle use.  - CARDIOVASCULAR: Regular rate and rhythm. No murmur.  - ABDOMEN: Soft, non-tender and non-distended. No palpable masses.  - EXTREMITIES: No edema. Non-tender.  - SKIN: No rashes or lesions. Warm. Varicose veins b/l LEs  - NEUROLOGIC: Mild R eyelid droop, chronic, no other deficits appreciated  - PSYCHIATRIC: Cooperative. Appropriate mood and affect.    VS  T(C): 36.3 (03-29-25 @ 05:31), Max: 36.6 (03-28-25 @ 12:33)  HR: 75 (03-29-25 @ 05:31) (70 - 75)  BP: 138/66 (03-29-25 @ 05:31) (120/75 - 145/66)  RR: 18 (03-29-25 @ 05:31) (17 - 19)  SpO2: 97% (03-29-25 @ 05:31) (96% - 100%)    LABS (available at time of writing 03-29-25 @ 07:15):                         12.1   11.12 )-----------( 218      ( 28 Mar 2025 07:35 )             37.2     03-28    140  |  107  |  15  ----------------------------<  129[H]  3.9   |  22  |  0.79    Ca    9.0      28 Mar 2025 07:35  Phos  3.4     03-28  Mg     1.50     03-28    TPro  7.1  /  Alb  4.3  /  TBili  0.8  /  DBili  x   /  AST  25  /  ALT  14  /  AlkPhos  75  03-27    PT/INR - ( 27 Mar 2025 21:30 )   PT: 11.9 sec;   INR: 1.03 ratio         PTT - ( 27 Mar 2025 21:30 )  PTT:29.5 sec  Urinalysis Basic - ( 28 Mar 2025 07:35 )    Color: x / Appearance: x / SG: x / pH: x  Gluc: 129 mg/dL / Ketone: x  / Bili: x / Urobili: x   Blood: x / Protein: x / Nitrite: x   Leuk Esterase: x / RBC: x / WBC x   Sq Epi: x / Non Sq Epi: x / Bacteria: x          Urinalysis with Rflx Culture (collected 03-27-25 @ 22:50)    Culture - Blood (collected 03-27-25 @ 21:30)  Source: Blood Blood-Peripheral  Preliminary Report (03-29-25 @ 03:02):    No growth at 24 hours    Culture - Blood (collected 03-27-25 @ 21:02)  Source: Blood Blood-Venous  Preliminary Report (03-29-25 @ 03:02):    No growth at 24 hours        Medications:   acetaminophen     Tablet .. 650 milliGRAM(s) Oral every 6 hours PRN  atorvastatin 10 milliGRAM(s) Oral at bedtime  chlorhexidine 2% Cloths 1 Application(s) Topical <User Schedule>  dextrose 5%. 1000 milliLiter(s) IV Continuous <Continuous>  dextrose 5%. 1000 milliLiter(s) IV Continuous <Continuous>  dextrose 50% Injectable 25 Gram(s) IV Push once  dextrose 50% Injectable 12.5 Gram(s) IV Push once  dextrose 50% Injectable 25 Gram(s) IV Push once  dextrose Oral Gel 15 Gram(s) Oral once PRN  enoxaparin Injectable 40 milliGRAM(s) SubCutaneous every 24 hours  glucagon  Injectable 1 milliGRAM(s) IntraMuscular once  insulin lispro (ADMELOG) corrective regimen sliding scale   SubCutaneous three times a day before meals  insulin lispro (ADMELOG) corrective regimen sliding scale   SubCutaneous at bedtime  levothyroxine 37.5 MICROGram(s) Oral daily  melatonin 3 milliGRAM(s) Oral at bedtime PRN  multivitamin 1 Tablet(s) Oral daily  pantoprazole    Tablet 40 milliGRAM(s) Oral before breakfast  piperacillin/tazobactam IVPB.. 3.375 Gram(s) IV Intermittent every 8 hours  predniSONE   Tablet 5 milliGRAM(s) Oral daily  sodium chloride 0.9%. 1000 milliLiter(s) IV Continuous <Continuous>  tacrolimus 1.5 milliGRAM(s) Oral two times a day      RADIOLOGY, EKG & ADDITIONAL TESTS: Reviewed.

## 2025-03-29 NOTE — DISCHARGE NOTE PROVIDER - NSDCMRMEDTOKEN_GEN_ALL_CORE_FT
alendronate 35 mg oral tablet: 1 tab(s) orally once a week  amLODIPine 2.5 mg oral tablet: 1 tab(s) orally once a day  enalapril 10 mg oral tablet: 1 tab(s) orally once a day  levothyroxine 75 mcg (0.075 mg) oral tablet: 0.5 tab(s) orally once a day  metFORMIN 750 mg oral tablet, extended release: 1 tab(s) orally once a day  methenamine hippurate 1 g oral tablet: 1 tab(s) orally 2 times a day  metoprolol succinate 50 mg oral tablet, extended release: 1 tab(s) orally once a day  Multiple Vitamins oral tablet: 1 tab(s) orally once a day  omeprazole 20 mg oral delayed release tablet: 1 tab(s) orally once a day  predniSONE 5 mg oral tablet: 1 tab(s) orally once a day  simvastatin 20 mg oral tablet: 1 tab(s) orally once a day  Tacrolimus: 1.5 milligram(s) orally 2 times a day   alendronate 35 mg oral tablet: 1 tab(s) orally once a week  amLODIPine 2.5 mg oral tablet: 1 tab(s) orally once a day  ciprofloxacin 500 mg oral tablet: 1 tab(s) orally 2 times a day Please take 1 tab 2 times per day for 3 days  enalapril 10 mg oral tablet: 1 tab(s) orally once a day  levothyroxine 75 mcg (0.075 mg) oral tablet: 0.5 tab(s) orally once a day  metFORMIN 750 mg oral tablet, extended release: 1 tab(s) orally once a day  methenamine hippurate 1 g oral tablet: 1 tab(s) orally 2 times a day  metoprolol succinate 50 mg oral tablet, extended release: 1 tab(s) orally once a day  metroNIDAZOLE 500 mg oral tablet: 1 tab(s) orally 2 times a day Please take 1 tab 2 times per day for 3 days  Multiple Vitamins oral tablet: 1 tab(s) orally once a day  omeprazole 20 mg oral delayed release tablet: 1 tab(s) orally once a day  predniSONE 5 mg oral tablet: 1 tab(s) orally once a day  simvastatin 20 mg oral tablet: 1 tab(s) orally once a day  tacrolimus 0.5 mg oral capsule: 3 cap(s) orally 2 times a day

## 2025-03-29 NOTE — PROVIDER CONTACT NOTE (OTHER) - ACTION/TREATMENT ORDERED:
MD notified and aware, no further interventions ordered
No further tx necessary
Patient educated on importance of insulin and still refusing. MD Alexandre Brandon notified and made aware. no new interventions ordered at this time

## 2025-03-29 NOTE — DISCHARGE NOTE PROVIDER - PROVIDER TOKENS
FREE:[LAST:[Urbina],FIRST:[Musa],PHONE:[phone]],FAX:[(   )    -],ADDRESS:[[address] [place] [address]],FOLLOWUP:[2 weeks],ESTABLISHEDPATIENT:[T]]

## 2025-03-29 NOTE — DISCHARGE NOTE PROVIDER - HOSPITAL COURSE
HPI:  This is an 85 y/o F with PMHx HTN, T2DM, hypothyroidism, ESRD s/p renal transplant (2011), recurrent UTIs, and HLD who presented to the ED for generalized malaise x 2-3 days. Accompanying symptoms also include NBNB vomiting and diarrhea. Denied any fever, chills, chest pain, sob at home. Denied any sick contacts or recent travel.     Of note, patient w/ a history of recurrent UTIs. Prior cultures w/ Ecoli. She was on cefpodoxime 1 month ago for UTI.     In the ED T max 101.5, , /80, RR 18, 98% on RA. Labs significant for WBC 16.97, bicarb 18, U/A trace LE, positive nitrite, many bacteria. CT A/P w/ colitis involving the distal transverse colon, descending colon, and   sigmoid colon. s/p vanc 1g x1, zosyn 3.375gx1 and 1.9L NS bolus.    (28 Mar 2025 05:14)    During admission, pt started on zosyn for UTI and colitis. Nephrology consulted for transplant management. MMF held and tacro level maintained to 3-5. CMV PCR showed ***. Pt to be discharged with close transplant nephrology follow up.    Medication changes:    Follow up:  PCP  Gyn for fu ovarian cyst HPI:  This is an 85 y/o F with PMHx HTN, T2DM, hypothyroidism, ESRD s/p renal transplant (2011), recurrent UTIs, and HLD who presented to the ED for generalized malaise x 2-3 days. Accompanying symptoms also include NBNB vomiting and diarrhea. Denied any fever, chills, chest pain, sob at home. Denied any sick contacts or recent travel.     Of note, patient w/ a history of recurrent UTIs. Prior cultures w/ Ecoli. She was on cefpodoxime 1 month ago for UTI.     In the ED T max 101.5, , /80, RR 18, 98% on RA. Labs significant for WBC 16.97, bicarb 18, U/A trace LE, positive nitrite, many bacteria. CT A/P w/ colitis involving the distal transverse colon, descending colon, and   sigmoid colon. s/p vanc 1g x1, zosyn 3.375gx1 and 1.9L NS bolus.    (28 Mar 2025 05:14)    During admission, pt started on zosyn for UTI and colitis. Nephrology consulted for transplant management. MMF held and tacro level maintained to 3-5. CMV PCR showed ***. Pt to be discharged with close transplant nephrology follow up.    Medication changes:  Augmentin  Metronidazole    Follow up:  PCP  Gyn for fu ovarian cyst HPI:  This is an 85 y/o F with PMHx HTN, T2DM, hypothyroidism, ESRD s/p renal transplant (2011), recurrent UTIs, and HLD who presented to the ED for generalized malaise x 2-3 days. Accompanying symptoms also include NBNB vomiting and diarrhea. Denied any fever, chills, chest pain, sob at home. Denied any sick contacts or recent travel.     Of note, patient w/ a history of recurrent UTIs. Prior cultures w/ Ecoli. She was on cefpodoxime 1 month ago for UTI.     In the ED T max 101.5, , /80, RR 18, 98% on RA. Labs significant for WBC 16.97, bicarb 18, U/A trace LE, positive nitrite, many bacteria. CT A/P w/ colitis involving the distal transverse colon, descending colon, and   sigmoid colon. s/p vanc 1g x1, zosyn 3.375gx1 and 1.9L NS bolus.    (28 Mar 2025 05:14)    During admission, pt started on zosyn for UTI and colitis. Nephrology consulted for transplant management. MMF held and tacro level maintained to 3-5. CMV PCR showed ***. Pt to be discharged with close transplant nephrology follow up.    Medication changes:  Cefpodoxime  Metronidazole    Follow up:  PCP  Gyn for fu ovarian cyst HPI:  This is an 85 y/o F with PMHx HTN, T2DM, hypothyroidism, ESRD s/p renal transplant (2011), recurrent UTIs, and HLD who presented to the ED for generalized malaise x 2-3 days. Accompanying symptoms also include NBNB vomiting and diarrhea. Denied any fever, chills, chest pain, sob at home. Denied any sick contacts or recent travel.     Of note, patient w/ a history of recurrent UTIs. Prior cultures w/ Ecoli. She was on cefpodoxime 1 month ago for UTI.     In the ED T max 101.5, , /80, RR 18, 98% on RA. Labs significant for WBC 16.97, bicarb 18, U/A trace LE, positive nitrite, many bacteria. CT A/P w/ colitis involving the distal transverse colon, descending colon, and   sigmoid colon. s/p vanc 1g x1, zosyn 3.375gx1 and 1.9L NS bolus.    (28 Mar 2025 05:14)    During admission, pt started on zosyn for UTI and colitis. Nephrology consulted for transplant management. MMF held and tacro level maintained to 3-5. CMV PCR showed ***. Pt to be discharged with close transplant nephrology follow up.    Medication changes:  Ciprofloxacin  Metronidazole    Follow up:  PCP  Gyn for fu ovarian cyst HPI:  This is an 83 y/o F with PMHx HTN, T2DM, hypothyroidism, ESRD s/p renal transplant (2011), recurrent UTIs, and HLD who presented to the ED for generalized malaise x 2-3 days. Accompanying symptoms also include NBNB vomiting and diarrhea. Denied any fever, chills, chest pain, sob at home. Denied any sick contacts or recent travel.     Of note, patient w/ a history of recurrent UTIs. Prior cultures w/ Ecoli. She was on cefpodoxime 1 month ago for UTI.     In the ED T max 101.5, , /80, RR 18, 98% on RA. Labs significant for WBC 16.97, bicarb 18, U/A trace LE, positive nitrite, many bacteria. CT A/P w/ colitis involving the distal transverse colon, descending colon, and   sigmoid colon. s/p vanc 1g x1, zosyn 3.375gx1 and 1.9L NS bolus.    (28 Mar 2025 05:14)    During admission, pt started on zosyn for UTI and colitis. Nephrology consulted for transplant management. MMF held and tacro level maintained to 3-5. CMV PCR was negative. Pt to be discharged with close transplant nephrology follow up.    Medication changes:  Ciprofloxacin  Metronidazole    Follow up:  PCP  Gyn for fu ovarian cyst

## 2025-03-29 NOTE — DIETITIAN INITIAL EVALUATION ADULT - PROBLEM SELECTOR PLAN 3
U/A w/ trace LE, positive nitrite, many bacteria, 5WBC.   Hx of recurrent UTI, prior cultures growing Ecoli per family.     - continue abx as above   - f/u urine cx  - hold methenamine hippurate at this time

## 2025-03-29 NOTE — DIETITIAN INITIAL EVALUATION ADULT - OTHER INFO
84 year old female with a PMH of HTN, T2DM, hypothyroidism, ESRD s/p renal transplant (2011), T2DM and HLD who presented to the ED for generalized malaise x 2-3 days, admitted for sepsis 2/2 colitis and UTI per chart.    Patient reports good appetite and consumed all of breakfast today per observation. No food preferences. Patient reporting diarrhea, no N/V. Has no food allergies. UBW is around 112 lbs., but varies higher at times (on prednisone at home). ABW is 118.8 lbs. (3/28) per chart indicating a +6% weight gain. No edema or pressure injuries noted per RN flow sheet.    Patient w/ no questions regarding nutrition at this time.

## 2025-03-29 NOTE — DIETITIAN INITIAL EVALUATION ADULT - PROBLEM/PLAN-9
Photo Preface (Leave Blank If You Do Not Want): Photographs obtained today Detail Level: Detailed DISPLAY PLAN FREE TEXT

## 2025-03-29 NOTE — PROGRESS NOTE ADULT - PROBLEM SELECTOR PLAN 1
WBC 16.97, T101.5, . Source likely colitis and UTI.   CXR and limited RVP negative.   s/p vanc and zosyn     - continue zosyn   - f/u blood cx x2  - collect urine cx   - GI PCR, stool culture if pt still w/ diarrhea   - trend fever curve, WBC WBC 16.97, T101.5, . Source likely colitis and UTI.   CXR and limited RVP negative.   s/p vanc and zosyn     - continue zosyn   - f/u blood cx x2  - collect urine cx   - GI PCR, stool culture if pt still w/ diarrhea   - trend fever curve, WBC  - fu CMV

## 2025-03-29 NOTE — PROVIDER CONTACT NOTE (OTHER) - ASSESSMENT
No acute distress, denies dizziness or lightheadedness
Patient refused SS insulin; ; pt educated on risks vs benefits with daughter at bedside
Patient assessed. no signs or symptoms of distress noted

## 2025-03-29 NOTE — PROVIDER CONTACT NOTE (OTHER) - REASON
Chart(s)/Patient
/54
Patients blood sugar 171. Patient refusing 1 unit of insulin.
Patient refused SS insulin

## 2025-03-29 NOTE — DIETITIAN INITIAL EVALUATION ADULT - PROBLEM SELECTOR PLAN 2
CT A/P w/ colitis involving the distal transverse colon, descending colon, and sigmoid colon.    - abx as above  - GI PCR, stool culture if persistent diarrhea  - consider C diff if mets criteria given recent abx use

## 2025-03-29 NOTE — DIETITIAN INITIAL EVALUATION ADULT - PERTINENT MEDS FT
MEDICATIONS  (STANDING):  atorvastatin 10 milliGRAM(s) Oral at bedtime  chlorhexidine 2% Cloths 1 Application(s) Topical <User Schedule>  dextrose 5%. 1000 milliLiter(s) (50 mL/Hr) IV Continuous <Continuous>  dextrose 5%. 1000 milliLiter(s) (100 mL/Hr) IV Continuous <Continuous>  dextrose 50% Injectable 25 Gram(s) IV Push once  dextrose 50% Injectable 12.5 Gram(s) IV Push once  dextrose 50% Injectable 25 Gram(s) IV Push once  enoxaparin Injectable 40 milliGRAM(s) SubCutaneous every 24 hours  glucagon  Injectable 1 milliGRAM(s) IntraMuscular once  insulin lispro (ADMELOG) corrective regimen sliding scale   SubCutaneous at bedtime  insulin lispro (ADMELOG) corrective regimen sliding scale   SubCutaneous three times a day before meals  levothyroxine 37.5 MICROGram(s) Oral daily  multivitamin 1 Tablet(s) Oral daily  pantoprazole    Tablet 40 milliGRAM(s) Oral before breakfast  piperacillin/tazobactam IVPB.. 3.375 Gram(s) IV Intermittent every 8 hours  predniSONE   Tablet 5 milliGRAM(s) Oral daily  sodium chloride 0.9%. 1000 milliLiter(s) (75 mL/Hr) IV Continuous <Continuous>  tacrolimus 1.5 milliGRAM(s) Oral two times a day    MEDICATIONS  (PRN):  acetaminophen     Tablet .. 650 milliGRAM(s) Oral every 6 hours PRN Temp greater or equal to 38C (100.4F), Mild Pain (1 - 3)  dextrose Oral Gel 15 Gram(s) Oral once PRN Blood Glucose LESS THAN 70 milliGRAM(s)/deciliter  melatonin 3 milliGRAM(s) Oral at bedtime PRN Insomnia

## 2025-03-29 NOTE — PROGRESS NOTE ADULT - ASSESSMENT
This is an 85 y/o F with PMHx HTN, T2DM, hypothyroidism, ESRD s/p renal transplant (2011), recurrent UTIs, T2DM and HLD who presented to the ED for generalized malaise x 2-3 days, admitted for sepsis 2/2 colitis and UTI.  This is an 85 y/o F with PMHx HTN, T2DM, hypothyroidism, ESRD s/p renal transplant (2011), recurrent UTIs, SDH, Ischemic colitis and HLD who presented to the ED for generalized malaise x 2-3 days, admitted for sepsis 2/2 colitis and UTI.

## 2025-03-29 NOTE — DISCHARGE NOTE PROVIDER - NSFOLLOWUPCLINICSTOKEN_GEN_ALL_ED_FT
938515:1 month|| ||00\01||False;524867:2 weeks|| ||00\01||False;250518:1 month|| ||00\01||False; 116493:1 month|| ||00\01||False;039942:2 weeks|| ||00\01||False;698631:1 month|| ||00\01||False;194294:1 month|| ||00\01||False; 300198:1 month|| ||00\01||False;253394:2 weeks|| ||00\01||False;263377:1 month|| ||00\01||False;070680:1 week|| ||00\01||False;

## 2025-03-29 NOTE — PROGRESS NOTE ADULT - ATTENDING COMMENTS
83 y/o F with pmhx of HTN, T2DM, hypothyroidism, ESRD s/p renal transplant (2011), recurrent UTIs, T2DM and HLD who presented to the ED for generalized malaise x 2-3 days, admitted for sepsis 2/2 colitis and UTI. CT scan with colitis in distal transverse colon, descending colon and sigmoid colon. Given hx of kidney transplant, transplant nephrology recommending CMV, pending. Ucx with no growth, Bcx NGTD. UA with trace LE and positive nitrites .

## 2025-03-29 NOTE — DIETITIAN INITIAL EVALUATION ADULT - PERTINENT LABORATORY DATA
03-29    138  |  105  |  16  ----------------------------<  125[H]  4.3   |  22  |  0.82    Ca    9.3      29 Mar 2025 05:50  Phos  3.0     03-29  Mg     1.90     03-29    TPro  6.1  /  Alb  3.7  /  TBili  0.5  /  DBili  x   /  AST  18  /  ALT  12  /  AlkPhos  63  03-29  POCT Blood Glucose.: 145 mg/dL (03-29-25 @ 08:34)  A1C with Estimated Average Glucose Result: 7.2 % (03-28-25 @ 07:35)

## 2025-03-29 NOTE — DIETITIAN INITIAL EVALUATION ADULT - PROBLEM SELECTOR PLAN 8
CT A/P w/ incidental finding of simple appearing cystic structure measuring 9.3 x 6.9 x 7.7 cm. which appears to rise from the right adnexa/ovary.     - discussed w/ family, family aware of ovarian cyst. will f/u pelvic U/S outpatient

## 2025-03-29 NOTE — DISCHARGE NOTE PROVIDER - CARE PROVIDER_API CALL
Musa Urbina  [address] [place] [id number]  Phone: [phone]  Fax: (   )    -  Established Patient  Follow Up Time: 2 weeks

## 2025-03-29 NOTE — PROGRESS NOTE ADULT - PROBLEM SELECTOR PLAN 6
Home regimen: Metformin 750mg qd but pt does not take it daily.     - check A1c   - LOBO premeal and bedtime   - CC diet Home regimen: Metformin 750mg qd but pt does not take it daily.     - check A1c - 7.2%  - LOBO premeal and bedtime   - CC diet

## 2025-03-29 NOTE — PROVIDER CONTACT NOTE (OTHER) - BACKGROUND
Pt is A&Ox4 on RA; admitted w/ sepsis r/t UTI
Patient admitted for sepsis.   PMH of type 2 diabetes mellitus , HTN, and  hypothyroidism.
Sepsis

## 2025-03-29 NOTE — DISCHARGE NOTE PROVIDER - NSDCCPCAREPLAN_GEN_ALL_CORE_FT
PRINCIPAL DISCHARGE DIAGNOSIS  Diagnosis: Sepsis secondary to UTI  Assessment and Plan of Treatment: You came to the hospital due to weakness. During hospitalization you were found to have a UTI and inflammation of the colon called colitis. You were treated with an antibiotic, zosyn. We also checked to see if a virus, CMV, was the cause of the colitis. We did not see CMV on our tests.  We spoke with your nephrologist, Dr Urbina's team who recommended holding Myfortic. Please do not restart taking this medication until you see Dr Urbina. Please continue to take prednisone and tacrilomus.  Please continue to take the antibiotic *** as prescribed.  Please follow up with Dr Urbina for continued management of your kidney transplant medications.   On a CT scan in the ED, we saw an ovarian cyst that measured 9cm at the greatest. This cyst has been shown on prior CTs in the past. Please follow up with a OB/GYN for a pelvic ultrasound of this cyst.  Please return to the hospital if you develop severe diarrhea, fevers, or burning with urination.     PRINCIPAL DISCHARGE DIAGNOSIS  Diagnosis: Sepsis secondary to UTI  Assessment and Plan of Treatment: You came to the hospital due to weakness. During hospitalization you were found to have a UTI and inflammation of the colon called colitis. You were treated with an antibiotic, zosyn. We also checked to see if a virus, CMV, was the cause of the colitis. We did not see CMV on our tests.  We spoke with your nephrologist, Dr Urbina's team who recommended holding Myfortic. Please do not restart taking this medication until you see Dr Urbina. Please continue to take prednisone and tacrilomus.  Please continue to take the antibiotics, Augmentin and Flagyl, as prescribed.  Please follow up with Dr Urbina for continued management of your kidney transplant medications.   On a CT scan in the ED, we saw an ovarian cyst that measured 9cm at the greatest. This cyst has been shown on prior CTs in the past. Please follow up with a OB/GYN for a pelvic ultrasound of this cyst.  Please return to the hospital if you develop severe diarrhea, fevers, or burning with urination.     PRINCIPAL DISCHARGE DIAGNOSIS  Diagnosis: Sepsis secondary to UTI  Assessment and Plan of Treatment: You came to the hospital due to weakness. During hospitalization you were found to have a UTI and inflammation of the colon called colitis. You were treated with an antibiotic, zosyn. We also checked to see if a virus, CMV, was the cause of the colitis. We did not see CMV on our tests.  We spoke with your nephrologist, Dr Urbina's team who recommended holding Myfortic. Please do not restart taking this medication until you see Dr Urbina. Please continue to take prednisone and tacrilomus.  Please continue to take the antibiotics, Cefpodxime and Flagyl, as prescribed.  Please follow up with Dr Urbina for continued management of your kidney transplant medications.   On a CT scan in the ED, we saw an ovarian cyst that measured 9cm at the greatest. This cyst has been shown on prior CTs in the past. Please follow up with a OB/GYN for a pelvic ultrasound of this cyst.  Please return to the hospital if you develop severe diarrhea, fevers, or burning with urination.     PRINCIPAL DISCHARGE DIAGNOSIS  Diagnosis: Sepsis secondary to UTI  Assessment and Plan of Treatment: You came to the hospital due to weakness. During hospitalization you were found to have a UTI and inflammation of the colon called colitis. You were treated with an antibiotic, zosyn. We also checked to see if a virus, CMV, was the cause of the colitis. We did not see CMV on our tests.  We spoke with your nephrologist, Dr Urbina's team who recommended holding Myfortic. Please do not restart taking this medication until you see Dr Urbina. Please continue to take prednisone and tacrilomus.  Please continue to take the antibiotics, ciprofloxacin and metronidazole, as prescribed.  Please follow up with Dr Urbina for continued management of your kidney transplant medications.   On a CT scan in the ED, we saw an ovarian cyst that measured 9cm at the greatest. This cyst has been shown on prior CTs in the past. Please follow up with a OB/GYN for a pelvic ultrasound of this cyst.  Please return to the hospital if you develop severe diarrhea, fevers, or burning with urination.

## 2025-03-29 NOTE — DIETITIAN INITIAL EVALUATION ADULT - CALCULATED FROM (G/KG)
53.9 Hemigard Intro: Due to skin fragility and wound tension, it was decided to use HEMIGARD adhesive retention suture devices to permit a linear closure. The skin was cleaned and dried for a 6cm distance away from the wound. Excessive hair, if present, was removed to allow for adhesion.

## 2025-03-30 LAB
ALBUMIN SERPL ELPH-MCNC: 3.8 G/DL — SIGNIFICANT CHANGE UP (ref 3.3–5)
ALP SERPL-CCNC: 64 U/L — SIGNIFICANT CHANGE UP (ref 40–120)
ALT FLD-CCNC: 15 U/L — SIGNIFICANT CHANGE UP (ref 4–33)
ANION GAP SERPL CALC-SCNC: 12 MMOL/L — SIGNIFICANT CHANGE UP (ref 7–14)
AST SERPL-CCNC: 16 U/L — SIGNIFICANT CHANGE UP (ref 4–32)
BASOPHILS # BLD AUTO: 0.08 K/UL — SIGNIFICANT CHANGE UP (ref 0–0.2)
BASOPHILS NFR BLD AUTO: 1.1 % — SIGNIFICANT CHANGE UP (ref 0–2)
BILIRUB SERPL-MCNC: 0.5 MG/DL — SIGNIFICANT CHANGE UP (ref 0.2–1.2)
BUN SERPL-MCNC: 17 MG/DL — SIGNIFICANT CHANGE UP (ref 7–23)
CALCIUM SERPL-MCNC: 9.4 MG/DL — SIGNIFICANT CHANGE UP (ref 8.4–10.5)
CHLORIDE SERPL-SCNC: 105 MMOL/L — SIGNIFICANT CHANGE UP (ref 98–107)
CO2 SERPL-SCNC: 24 MMOL/L — SIGNIFICANT CHANGE UP (ref 22–31)
CREAT SERPL-MCNC: 0.93 MG/DL — SIGNIFICANT CHANGE UP (ref 0.5–1.3)
EGFR: 60 ML/MIN/1.73M2 — SIGNIFICANT CHANGE UP
EGFR: 60 ML/MIN/1.73M2 — SIGNIFICANT CHANGE UP
EOSINOPHIL # BLD AUTO: 0.13 K/UL — SIGNIFICANT CHANGE UP (ref 0–0.5)
EOSINOPHIL NFR BLD AUTO: 1.8 % — SIGNIFICANT CHANGE UP (ref 0–6)
GI PCR PANEL: ABNORMAL
GLUCOSE BLDC GLUCOMTR-MCNC: 145 MG/DL — HIGH (ref 70–99)
GLUCOSE BLDC GLUCOMTR-MCNC: 158 MG/DL — HIGH (ref 70–99)
GLUCOSE BLDC GLUCOMTR-MCNC: 160 MG/DL — HIGH (ref 70–99)
GLUCOSE BLDC GLUCOMTR-MCNC: 164 MG/DL — HIGH (ref 70–99)
GLUCOSE SERPL-MCNC: 139 MG/DL — HIGH (ref 70–99)
HCT VFR BLD CALC: 37.6 % — SIGNIFICANT CHANGE UP (ref 34.5–45)
HGB BLD-MCNC: 12.1 G/DL — SIGNIFICANT CHANGE UP (ref 11.5–15.5)
IANC: 4.04 K/UL — SIGNIFICANT CHANGE UP (ref 1.8–7.4)
IMM GRANULOCYTES NFR BLD AUTO: 0.4 % — SIGNIFICANT CHANGE UP (ref 0–0.9)
LYMPHOCYTES # BLD AUTO: 2.2 K/UL — SIGNIFICANT CHANGE UP (ref 1–3.3)
LYMPHOCYTES # BLD AUTO: 30.5 % — SIGNIFICANT CHANGE UP (ref 13–44)
MAGNESIUM SERPL-MCNC: 1.8 MG/DL — SIGNIFICANT CHANGE UP (ref 1.6–2.6)
MCHC RBC-ENTMCNC: 28.3 PG — SIGNIFICANT CHANGE UP (ref 27–34)
MCHC RBC-ENTMCNC: 32.2 G/DL — SIGNIFICANT CHANGE UP (ref 32–36)
MCV RBC AUTO: 87.9 FL — SIGNIFICANT CHANGE UP (ref 80–100)
MONOCYTES # BLD AUTO: 0.73 K/UL — SIGNIFICANT CHANGE UP (ref 0–0.9)
MONOCYTES NFR BLD AUTO: 10.1 % — SIGNIFICANT CHANGE UP (ref 2–14)
NEUTROPHILS # BLD AUTO: 4.04 K/UL — SIGNIFICANT CHANGE UP (ref 1.8–7.4)
NEUTROPHILS NFR BLD AUTO: 56.1 % — SIGNIFICANT CHANGE UP (ref 43–77)
NOROVIRUS GI+II RNA STL QL NAA+NON-PROBE: ABNORMAL
NRBC # BLD AUTO: 0 K/UL — SIGNIFICANT CHANGE UP (ref 0–0)
NRBC # FLD: 0 K/UL — SIGNIFICANT CHANGE UP (ref 0–0)
NRBC BLD AUTO-RTO: 0 /100 WBCS — SIGNIFICANT CHANGE UP (ref 0–0)
PHOSPHATE SERPL-MCNC: 3.6 MG/DL — SIGNIFICANT CHANGE UP (ref 2.5–4.5)
PLATELET # BLD AUTO: 228 K/UL — SIGNIFICANT CHANGE UP (ref 150–400)
POTASSIUM SERPL-MCNC: 3.6 MMOL/L — SIGNIFICANT CHANGE UP (ref 3.5–5.3)
POTASSIUM SERPL-SCNC: 3.6 MMOL/L — SIGNIFICANT CHANGE UP (ref 3.5–5.3)
PROT SERPL-MCNC: 6.4 G/DL — SIGNIFICANT CHANGE UP (ref 6–8.3)
RBC # BLD: 4.28 M/UL — SIGNIFICANT CHANGE UP (ref 3.8–5.2)
RBC # FLD: 14.2 % — SIGNIFICANT CHANGE UP (ref 10.3–14.5)
SODIUM SERPL-SCNC: 141 MMOL/L — SIGNIFICANT CHANGE UP (ref 135–145)
TACROLIMUS SERPL-MCNC: 9.4 NG/ML — SIGNIFICANT CHANGE UP
WBC # BLD: 7.21 K/UL — SIGNIFICANT CHANGE UP (ref 3.8–10.5)
WBC # FLD AUTO: 7.21 K/UL — SIGNIFICANT CHANGE UP (ref 3.8–10.5)

## 2025-03-30 PROCEDURE — 99232 SBSQ HOSP IP/OBS MODERATE 35: CPT

## 2025-03-30 RX ADMIN — Medication 37.5 MICROGRAM(S): at 04:08

## 2025-03-30 RX ADMIN — ATORVASTATIN CALCIUM 10 MILLIGRAM(S): 80 TABLET, FILM COATED ORAL at 21:52

## 2025-03-30 RX ADMIN — PREDNISONE 5 MILLIGRAM(S): 20 TABLET ORAL at 06:04

## 2025-03-30 RX ADMIN — Medication 25 GRAM(S): at 21:52

## 2025-03-30 RX ADMIN — INSULIN LISPRO 1: 100 INJECTION, SOLUTION INTRAVENOUS; SUBCUTANEOUS at 12:55

## 2025-03-30 RX ADMIN — Medication 40 MILLIGRAM(S): at 06:04

## 2025-03-30 RX ADMIN — Medication 1 TABLET(S): at 12:54

## 2025-03-30 RX ADMIN — Medication 650 MILLIGRAM(S): at 03:06

## 2025-03-30 RX ADMIN — Medication 1 APPLICATION(S): at 06:08

## 2025-03-30 RX ADMIN — Medication 25 GRAM(S): at 06:03

## 2025-03-30 RX ADMIN — INSULIN LISPRO 1: 100 INJECTION, SOLUTION INTRAVENOUS; SUBCUTANEOUS at 09:04

## 2025-03-30 RX ADMIN — TACROLIMUS 1.5 MILLIGRAM(S): 0.5 CAPSULE ORAL at 21:52

## 2025-03-30 RX ADMIN — INSULIN LISPRO 1: 100 INJECTION, SOLUTION INTRAVENOUS; SUBCUTANEOUS at 17:17

## 2025-03-30 RX ADMIN — Medication 25 GRAM(S): at 14:48

## 2025-03-30 RX ADMIN — Medication 650 MILLIGRAM(S): at 04:06

## 2025-03-30 RX ADMIN — TACROLIMUS 1.5 MILLIGRAM(S): 0.5 CAPSULE ORAL at 09:02

## 2025-03-30 NOTE — PROGRESS NOTE ADULT - SUBJECTIVE AND OBJECTIVE BOX
PROGRESS NOTE:   Authored by Dr. Germain Philippe MD     Patient is a 84y old  Female who presents with a chief complaint of Diarrhea, Sepsis (29 Mar 2025 13:16)      SUBJECTIVE / OVERNIGHT EVENTS:  No acute events overnight.     MEDICATIONS  (STANDING):  atorvastatin 10 milliGRAM(s) Oral at bedtime  chlorhexidine 2% Cloths 1 Application(s) Topical <User Schedule>  dextrose 5%. 1000 milliLiter(s) (100 mL/Hr) IV Continuous <Continuous>  dextrose 5%. 1000 milliLiter(s) (50 mL/Hr) IV Continuous <Continuous>  dextrose 50% Injectable 25 Gram(s) IV Push once  dextrose 50% Injectable 12.5 Gram(s) IV Push once  dextrose 50% Injectable 25 Gram(s) IV Push once  enoxaparin Injectable 40 milliGRAM(s) SubCutaneous every 24 hours  glucagon  Injectable 1 milliGRAM(s) IntraMuscular once  insulin lispro (ADMELOG) corrective regimen sliding scale   SubCutaneous three times a day before meals  insulin lispro (ADMELOG) corrective regimen sliding scale   SubCutaneous at bedtime  levothyroxine 37.5 MICROGram(s) Oral daily  multivitamin 1 Tablet(s) Oral daily  pantoprazole    Tablet 40 milliGRAM(s) Oral before breakfast  piperacillin/tazobactam IVPB.. 3.375 Gram(s) IV Intermittent every 8 hours  predniSONE   Tablet 5 milliGRAM(s) Oral daily  tacrolimus 1.5 milliGRAM(s) Oral two times a day    MEDICATIONS  (PRN):  acetaminophen     Tablet .. 650 milliGRAM(s) Oral every 6 hours PRN Temp greater or equal to 38C (100.4F), Mild Pain (1 - 3)  dextrose Oral Gel 15 Gram(s) Oral once PRN Blood Glucose LESS THAN 70 milliGRAM(s)/deciliter  melatonin 3 milliGRAM(s) Oral at bedtime PRN Insomnia      CAPILLARY BLOOD GLUCOSE      POCT Blood Glucose.: 158 mg/dL (30 Mar 2025 08:34)  POCT Blood Glucose.: 156 mg/dL (29 Mar 2025 21:24)  POCT Blood Glucose.: 171 mg/dL (29 Mar 2025 17:32)  POCT Blood Glucose.: 116 mg/dL (29 Mar 2025 12:28)    I&O's Summary    29 Mar 2025 07:01  -  30 Mar 2025 07:00  --------------------------------------------------------  IN: 0 mL / OUT: 1 mL / NET: -1 mL        PHYSICAL EXAM:  Vital Signs Last 24 Hrs  T(C): 36.3 (30 Mar 2025 05:57), Max: 36.5 (29 Mar 2025 13:15)  T(F): 97.3 (30 Mar 2025 05:57), Max: 97.7 (29 Mar 2025 13:15)  HR: 64 (30 Mar 2025 05:57) (64 - 74)  BP: 141/60 (30 Mar 2025 05:57) (129/63 - 141/60)  BP(mean): --  RR: 18 (30 Mar 2025 05:57) (18 - 18)  SpO2: 96% (30 Mar 2025 05:57) (96% - 97%)    Parameters below as of 30 Mar 2025 05:57  Patient On (Oxygen Delivery Method): room air        CONSTITUTIONAL: NAD  HEET: MMM, EOMI, PERRLA  NECK: supple  RESPIRATORY: Normal respiratory effort; lungs are clear to auscultation bilaterally  CARDIOVASCULAR: Regular rate and rhythm, normal S1 and S2, no murmur/rub/gallop; No lower extremity edema; Peripheral pulses are 2+ bilaterally  ABDOMEN: Nontender to palpation, normoactive bowel sounds, no rebound/guarding; No hepatosplenomegaly  MUSCULOSKELETAL: no clubbing or cyanosis of digits; no joint swelling or tenderness to palpation  PSYCH: A+O to person, place, and time; affect appropriate  SKIN: No rash    LABS:                        12.1   7.21  )-----------( 228      ( 30 Mar 2025 06:00 )             37.6     03-30    141  |  105  |  17  ----------------------------<  139[H]  3.6   |  24  |  0.93    Ca    9.4      30 Mar 2025 06:00  Phos  3.6     03-30  Mg     1.80     03-30    TPro  6.4  /  Alb  3.8  /  TBili  0.5  /  DBili  x   /  AST  16  /  ALT  15  /  AlkPhos  64  03-30          Urinalysis Basic - ( 30 Mar 2025 06:00 )    Color: x / Appearance: x / SG: x / pH: x  Gluc: 139 mg/dL / Ketone: x  / Bili: x / Urobili: x   Blood: x / Protein: x / Nitrite: x   Leuk Esterase: x / RBC: x / WBC x   Sq Epi: x / Non Sq Epi: x / Bacteria: x        Culture - Urine (collected 28 Mar 2025 15:02)  Source: Clean Catch Clean Catch (Midstream)  Final Report (29 Mar 2025 14:33):    <10,000 CFU/mL Normal Urogenital Kelly    Urinalysis with Rflx Culture (collected 27 Mar 2025 22:50)    Culture - Blood (collected 27 Mar 2025 21:30)  Source: Blood Blood-Peripheral  Preliminary Report (30 Mar 2025 03:01):    No growth at 48 Hours    Culture - Blood (collected 27 Mar 2025 21:02)  Source: Blood Blood-Venous  Preliminary Report (30 Mar 2025 03:01):    No growth at 48 Hours        Tele Reviewed:    RADIOLOGY & ADDITIONAL TESTS:  Results Reviewed:   Imaging Personally Reviewed:  Electrocardiogram Personally Reviewed:     PROGRESS NOTE:   Authored by Dr. Germain Philippe MD     Patient is a 84y old  Female who presents with a chief complaint of Diarrhea, Sepsis (29 Mar 2025 13:16)      SUBJECTIVE / OVERNIGHT EVENTS:  No acute events overnight. Patient has no acute complaints this morning.     MEDICATIONS  (STANDING):  atorvastatin 10 milliGRAM(s) Oral at bedtime  chlorhexidine 2% Cloths 1 Application(s) Topical <User Schedule>  dextrose 5%. 1000 milliLiter(s) (100 mL/Hr) IV Continuous <Continuous>  dextrose 5%. 1000 milliLiter(s) (50 mL/Hr) IV Continuous <Continuous>  dextrose 50% Injectable 25 Gram(s) IV Push once  dextrose 50% Injectable 12.5 Gram(s) IV Push once  dextrose 50% Injectable 25 Gram(s) IV Push once  enoxaparin Injectable 40 milliGRAM(s) SubCutaneous every 24 hours  glucagon  Injectable 1 milliGRAM(s) IntraMuscular once  insulin lispro (ADMELOG) corrective regimen sliding scale   SubCutaneous three times a day before meals  insulin lispro (ADMELOG) corrective regimen sliding scale   SubCutaneous at bedtime  levothyroxine 37.5 MICROGram(s) Oral daily  multivitamin 1 Tablet(s) Oral daily  pantoprazole    Tablet 40 milliGRAM(s) Oral before breakfast  piperacillin/tazobactam IVPB.. 3.375 Gram(s) IV Intermittent every 8 hours  predniSONE   Tablet 5 milliGRAM(s) Oral daily  tacrolimus 1.5 milliGRAM(s) Oral two times a day    MEDICATIONS  (PRN):  acetaminophen     Tablet .. 650 milliGRAM(s) Oral every 6 hours PRN Temp greater or equal to 38C (100.4F), Mild Pain (1 - 3)  dextrose Oral Gel 15 Gram(s) Oral once PRN Blood Glucose LESS THAN 70 milliGRAM(s)/deciliter  melatonin 3 milliGRAM(s) Oral at bedtime PRN Insomnia      CAPILLARY BLOOD GLUCOSE      POCT Blood Glucose.: 158 mg/dL (30 Mar 2025 08:34)  POCT Blood Glucose.: 156 mg/dL (29 Mar 2025 21:24)  POCT Blood Glucose.: 171 mg/dL (29 Mar 2025 17:32)  POCT Blood Glucose.: 116 mg/dL (29 Mar 2025 12:28)    I&O's Summary    29 Mar 2025 07:01  -  30 Mar 2025 07:00  --------------------------------------------------------  IN: 0 mL / OUT: 1 mL / NET: -1 mL        PHYSICAL EXAM:  Vital Signs Last 24 Hrs  T(C): 36.3 (30 Mar 2025 05:57), Max: 36.5 (29 Mar 2025 13:15)  T(F): 97.3 (30 Mar 2025 05:57), Max: 97.7 (29 Mar 2025 13:15)  HR: 64 (30 Mar 2025 05:57) (64 - 74)  BP: 141/60 (30 Mar 2025 05:57) (129/63 - 141/60)  BP(mean): --  RR: 18 (30 Mar 2025 05:57) (18 - 18)  SpO2: 96% (30 Mar 2025 05:57) (96% - 97%)    Parameters below as of 30 Mar 2025 05:57  Patient On (Oxygen Delivery Method): room air        CONSTITUTIONAL: NAD  RESPIRATORY: Normal respiratory effort;  CARDIOVASCULAR: Regular rate and rhythm,   ABDOMEN: Nontender to palpation, no joint swelling or tenderness to palpation  PSYCH: A+O to person, place, and time; affect appropriate  SKIN: No rash    LABS:                        12.1   7.21  )-----------( 228      ( 30 Mar 2025 06:00 )             37.6     03-30    141  |  105  |  17  ----------------------------<  139[H]  3.6   |  24  |  0.93    Ca    9.4      30 Mar 2025 06:00  Phos  3.6     03-30  Mg     1.80     03-30    TPro  6.4  /  Alb  3.8  /  TBili  0.5  /  DBili  x   /  AST  16  /  ALT  15  /  AlkPhos  64  03-30          Urinalysis Basic - ( 30 Mar 2025 06:00 )    Color: x / Appearance: x / SG: x / pH: x  Gluc: 139 mg/dL / Ketone: x  / Bili: x / Urobili: x   Blood: x / Protein: x / Nitrite: x   Leuk Esterase: x / RBC: x / WBC x   Sq Epi: x / Non Sq Epi: x / Bacteria: x        Culture - Urine (collected 28 Mar 2025 15:02)  Source: Clean Catch Clean Catch (Midstream)  Final Report (29 Mar 2025 14:33):    <10,000 CFU/mL Normal Urogenital Kelly    Urinalysis with Rflx Culture (collected 27 Mar 2025 22:50)    Culture - Blood (collected 27 Mar 2025 21:30)  Source: Blood Blood-Peripheral  Preliminary Report (30 Mar 2025 03:01):    No growth at 48 Hours    Culture - Blood (collected 27 Mar 2025 21:02)  Source: Blood Blood-Venous  Preliminary Report (30 Mar 2025 03:01):    No growth at 48 Hours        Tele Reviewed:    RADIOLOGY & ADDITIONAL TESTS:  Results Reviewed:   Imaging Personally Reviewed:  Electrocardiogram Personally Reviewed:

## 2025-03-30 NOTE — PROGRESS NOTE ADULT - PROBLEM SELECTOR PLAN 2
CT A/P w/ colitis involving the distal transverse colon, descending colon, and sigmoid colon.    - abx as above  - GI PCR, stool culture if persistent diarrhea  - consider C diff if meets criteria given recent abx use        > Cefpodoxime in January  > fu CMV CT A/P w/ colitis involving the distal transverse colon, descending colon, and sigmoid colon.    - abx as above  - GI PCR, stool culture if persistent diarrhea  - consider C diff if meets criteria given recent abx use        > Cefpodoxime in January  > fu CMV PCR

## 2025-03-30 NOTE — PROGRESS NOTE ADULT - ASSESSMENT
This is an 85 y/o F with PMHx HTN, T2DM, hypothyroidism, ESRD s/p renal transplant (2011), recurrent UTIs, SDH, Ischemic colitis and HLD who presented to the ED for generalized malaise x 2-3 days, admitted for sepsis 2/2 colitis and UTI.

## 2025-03-30 NOTE — PROGRESS NOTE ADULT - PROBLEM SELECTOR PLAN 6
Home regimen: Metformin 750mg qd but pt does not take it daily.     - check A1c - 7.2%  - LOBO premeal and bedtime   - CC diet

## 2025-03-30 NOTE — PROGRESS NOTE ADULT - PROBLEM SELECTOR PLAN 1
WBC 16.97, T101.5, . Source likely colitis and UTI.   CXR and limited RVP negative.   s/p vanc and zosyn     - continue zosyn   - f/u blood cx x2  - collect urine cx   - GI PCR, stool culture if pt still w/ diarrhea   - trend fever curve, WBC  - fu CMV WBC 16.97, T101.5, . Source likely colitis and UTI.   CXR and limited RVP negative.   s/p vanc and zosyn     - continue zosyn   - f/u blood cx x2 NGTD  - collect urine cx  NGTD  - GI PCR, stool culture if pt still w/ diarrhea--> improved  - trend fever curve, WBC  - fu CMV PCR

## 2025-03-30 NOTE — PROGRESS NOTE ADULT - PROBLEM SELECTOR PLAN 4
Hx renal transplant in 2011  Follows w/Dr. Musa Urbina at [facility]  Home meds: tacrolimus 1.5mg BID, prednisone 5mg qd, myfortic 720mg BID     - continue home tac 1.5mg BID   - check tac level in AM   - continue prednisone 5mg  - hold myfortic in setting of active infection   - notify Dr. Urbina of patient adm in AM        > Team does not come to hosp        > Spoke with Dr Urbina's team, rec hold MMF, tacro 3-5, cw prednisone        > transplant nephro following

## 2025-03-30 NOTE — PROGRESS NOTE ADULT - ATTENDING COMMENTS
85 y/o F with pmhx of HTN, T2DM, hypothyroidism, ESRD s/p renal transplant (2011), recurrent UTIs, T2DM and HLD who presented to the ED for generalized malaise x 2-3 days, admitted for sepsis 2/2 colitis and UTI. Meet sepsis criteria on admission WBC 16.97, T101.5, .  CT scan with colitis in distal transverse colon, descending colon and sigmoid colon. Given hx of kidney transplant, transplant nephrology recommending CMV, pending. Ucx with no growth, Bcx NGTD. UA with trace LE and positive nitrites. Tacrolimus level 9, spoke w/ transplant nephrology will keep current dose the same. Still pending CMV given colitis in setting of known immunosuppression.

## 2025-03-31 ENCOUNTER — TRANSCRIPTION ENCOUNTER (OUTPATIENT)
Age: 85
End: 2025-03-31

## 2025-03-31 VITALS
TEMPERATURE: 97 F | DIASTOLIC BLOOD PRESSURE: 61 MMHG | HEART RATE: 77 BPM | SYSTOLIC BLOOD PRESSURE: 132 MMHG | RESPIRATION RATE: 18 BRPM | OXYGEN SATURATION: 100 %

## 2025-03-31 LAB
ADD ON TEST-SPECIMEN IN LAB: SIGNIFICANT CHANGE UP
ALBUMIN SERPL ELPH-MCNC: 3.2 G/DL — LOW (ref 3.3–5)
ALP SERPL-CCNC: 68 U/L — SIGNIFICANT CHANGE UP (ref 40–120)
ALT FLD-CCNC: 12 U/L — SIGNIFICANT CHANGE UP (ref 4–33)
ANION GAP SERPL CALC-SCNC: 16 MMOL/L — HIGH (ref 7–14)
AST SERPL-CCNC: 24 U/L — SIGNIFICANT CHANGE UP (ref 4–32)
BILIRUB SERPL-MCNC: 0.4 MG/DL — SIGNIFICANT CHANGE UP (ref 0.2–1.2)
BUN SERPL-MCNC: 20 MG/DL — SIGNIFICANT CHANGE UP (ref 7–23)
CALCIUM SERPL-MCNC: 9 MG/DL — SIGNIFICANT CHANGE UP (ref 8.4–10.5)
CHLORIDE SERPL-SCNC: 105 MMOL/L — SIGNIFICANT CHANGE UP (ref 98–107)
CMV DNA CSF QL NAA+PROBE: SIGNIFICANT CHANGE UP IU/ML
CMV DNA SPEC NAA+PROBE-LOG#: SIGNIFICANT CHANGE UP LOG10IU/ML
CO2 SERPL-SCNC: 15 MMOL/L — LOW (ref 22–31)
CREAT SERPL-MCNC: 0.78 MG/DL — SIGNIFICANT CHANGE UP (ref 0.5–1.3)
EGFR: 74 ML/MIN/1.73M2 — SIGNIFICANT CHANGE UP
EGFR: 74 ML/MIN/1.73M2 — SIGNIFICANT CHANGE UP
GLUCOSE BLDC GLUCOMTR-MCNC: 149 MG/DL — HIGH (ref 70–99)
GLUCOSE BLDC GLUCOMTR-MCNC: 198 MG/DL — HIGH (ref 70–99)
GLUCOSE SERPL-MCNC: 135 MG/DL — HIGH (ref 70–99)
MAGNESIUM SERPL-MCNC: 1.7 MG/DL — SIGNIFICANT CHANGE UP (ref 1.6–2.6)
PHOSPHATE SERPL-MCNC: 3.6 MG/DL — SIGNIFICANT CHANGE UP (ref 2.5–4.5)
POTASSIUM SERPL-MCNC: 4.7 MMOL/L — SIGNIFICANT CHANGE UP (ref 3.5–5.3)
POTASSIUM SERPL-SCNC: 4.7 MMOL/L — SIGNIFICANT CHANGE UP (ref 3.5–5.3)
PROT SERPL-MCNC: 6.3 G/DL — SIGNIFICANT CHANGE UP (ref 6–8.3)
SODIUM SERPL-SCNC: 136 MMOL/L — SIGNIFICANT CHANGE UP (ref 135–145)

## 2025-03-31 PROCEDURE — 99239 HOSP IP/OBS DSCHRG MGMT >30: CPT

## 2025-03-31 RX ORDER — TACROLIMUS 0.5 MG/1
3 CAPSULE ORAL
Qty: 0 | Refills: 0 | DISCHARGE
Start: 2025-03-31

## 2025-03-31 RX ORDER — CIPROFLOXACIN HCL 250 MG
1 TABLET ORAL
Qty: 7 | Refills: 0
Start: 2025-03-31 | End: 2025-04-02

## 2025-03-31 RX ORDER — METRONIDAZOLE 250 MG
1 TABLET ORAL
Qty: 7 | Refills: 0
Start: 2025-03-31 | End: 2025-04-02

## 2025-03-31 RX ORDER — TACROLIMUS 0.5 MG/1
1.5 CAPSULE ORAL
Refills: 0 | DISCHARGE

## 2025-03-31 RX ADMIN — PREDNISONE 5 MILLIGRAM(S): 20 TABLET ORAL at 05:58

## 2025-03-31 RX ADMIN — Medication 25 GRAM(S): at 05:58

## 2025-03-31 RX ADMIN — Medication 1 TABLET(S): at 12:20

## 2025-03-31 RX ADMIN — Medication 1 APPLICATION(S): at 05:58

## 2025-03-31 RX ADMIN — INSULIN LISPRO 1: 100 INJECTION, SOLUTION INTRAVENOUS; SUBCUTANEOUS at 12:52

## 2025-03-31 RX ADMIN — Medication 37.5 MICROGRAM(S): at 05:58

## 2025-03-31 RX ADMIN — TACROLIMUS 1.5 MILLIGRAM(S): 0.5 CAPSULE ORAL at 09:13

## 2025-03-31 RX ADMIN — Medication 40 MILLIGRAM(S): at 05:58

## 2025-03-31 NOTE — DISCHARGE NOTE NURSING/CASE MANAGEMENT/SOCIAL WORK - FINANCIAL ASSISTANCE
[facility] provides services at a reduced cost to those who are determined to be eligible through [facility]’s financial assistance program. Information regarding [facility]’s financial assistance program can be found by going to [email or web address] or by calling [phone].

## 2025-03-31 NOTE — PROGRESS NOTE ADULT - SUBJECTIVE AND OBJECTIVE BOX
Patient is a 84y old  Female who presents with a chief complaint of Diarrhea, Sepsis (29 Mar 2025 13:16)      SUBJECTIVE / OVERNIGHT EVENTS:  No acute events overnight. Patient has no acute complaints this morning.     PHYSICAL EXAM:  CONSTITUTIONAL: NAD  RESPIRATORY: Normal respiratory effort;  CARDIOVASCULAR: Regular rate and rhythm,   ABDOMEN: Nontender to palpation, no joint swelling or tenderness to palpation  PSYCH: A+O to person, place, and time; affect appropriate  SKIN: No rash    VS  T(C): 36.8 (03-31-25 @ 05:23), Max: 36.8 (03-31-25 @ 05:23)  HR: 67 (03-31-25 @ 05:23) (67 - 73)  BP: 137/72 (03-31-25 @ 05:23) (124/61 - 137/72)  RR: 17 (03-31-25 @ 05:23) (17 - 18)  SpO2: 98% (03-31-25 @ 05:23) (97% - 98%)    LABS (available at time of writing 03-31-25 @ 07:10):                         12.1   7.21  )-----------( 228      ( 30 Mar 2025 06:00 )             37.6     03-30    141  |  105  |  17  ----------------------------<  139[H]  3.6   |  24  |  0.93    Ca    9.4      30 Mar 2025 06:00  Phos  3.6     03-30  Mg     1.80     03-30    TPro  6.4  /  Alb  3.8  /  TBili  0.5  /  DBili  x   /  AST  16  /  ALT  15  /  AlkPhos  64  03-30      Urinalysis Basic - ( 30 Mar 2025 06:00 )    Color: x / Appearance: x / SG: x / pH: x  Gluc: 139 mg/dL / Ketone: x  / Bili: x / Urobili: x   Blood: x / Protein: x / Nitrite: x   Leuk Esterase: x / RBC: x / WBC x   Sq Epi: x / Non Sq Epi: x / Bacteria: x          Culture - Urine (collected 03-28-25 @ 15:02)  Source: Clean Catch Clean Catch (Midstream)  Final Report (03-29-25 @ 14:33):    <10,000 CFU/mL Normal Urogenital Kelly    Urinalysis with Rflx Culture (collected 03-27-25 @ 22:50)    Culture - Blood (collected 03-27-25 @ 21:30)  Source: Blood Blood-Peripheral  Preliminary Report (03-31-25 @ 03:01):    No growth at 72 Hours    Culture - Blood (collected 03-27-25 @ 21:02)  Source: Blood Blood-Venous  Preliminary Report (03-31-25 @ 03:01):    No growth at 72 Hours        Medications:   acetaminophen     Tablet .. 650 milliGRAM(s) Oral every 6 hours PRN  atorvastatin 10 milliGRAM(s) Oral at bedtime  chlorhexidine 2% Cloths 1 Application(s) Topical <User Schedule>  dextrose 5%. 1000 milliLiter(s) IV Continuous <Continuous>  dextrose 5%. 1000 milliLiter(s) IV Continuous <Continuous>  dextrose 50% Injectable 25 Gram(s) IV Push once  dextrose 50% Injectable 12.5 Gram(s) IV Push once  dextrose 50% Injectable 25 Gram(s) IV Push once  dextrose Oral Gel 15 Gram(s) Oral once PRN  enoxaparin Injectable 40 milliGRAM(s) SubCutaneous every 24 hours  glucagon  Injectable 1 milliGRAM(s) IntraMuscular once  insulin lispro (ADMELOG) corrective regimen sliding scale   SubCutaneous three times a day before meals  insulin lispro (ADMELOG) corrective regimen sliding scale   SubCutaneous at bedtime  levothyroxine 37.5 MICROGram(s) Oral daily  melatonin 3 milliGRAM(s) Oral at bedtime PRN  multivitamin 1 Tablet(s) Oral daily  pantoprazole    Tablet 40 milliGRAM(s) Oral before breakfast  piperacillin/tazobactam IVPB.. 3.375 Gram(s) IV Intermittent every 8 hours  predniSONE   Tablet 5 milliGRAM(s) Oral daily  tacrolimus 1.5 milliGRAM(s) Oral two times a day      RADIOLOGY, EKG & ADDITIONAL TESTS: Reviewed.

## 2025-03-31 NOTE — PROGRESS NOTE ADULT - PROBLEM SELECTOR PLAN 2
CT A/P w/ colitis involving the distal transverse colon, descending colon, and sigmoid colon.    - abx as above  - GI PCR, stool culture if persistent diarrhea  - consider C diff if meets criteria given recent abx use        > Cefpodoxime in January  > fu CMV PCR CT A/P w/ colitis involving the distal transverse colon, descending colon, and sigmoid colon.    - abx as above  - GI PCR, stool culture if persistent diarrhea  - consider C diff if meets criteria given recent abx use        > Cefpodoxime in January  > fu CMV PCR        > CMV negative

## 2025-03-31 NOTE — DISCHARGE NOTE NURSING/CASE MANAGEMENT/SOCIAL WORK - PATIENT PORTAL LINK FT
You can access the FollowMyHealth Patient Portal offered by [facility] by registering at the following website: [email or web address]. By joining [employer]’s FollowMyHealth portal, you will also be able to view your health information using other applications (apps) compatible with our system.

## 2025-03-31 NOTE — DISCHARGE NOTE NURSING/CASE MANAGEMENT/SOCIAL WORK - NSDCFUADDAPPT_GEN_ALL_CORE_FT
Spoke with patient.  Conveyed Dr. Armstrong's message as below.    Patient has had a productive cough of white mucus for 2 weeks.  C/o left ankle swelling with no pitting edema  C/o S.O.B with walking only  Denies fever    Advised that patient be seen in [facility] for evaluation of her current symptoms as above.        COVID-19 Screening:    • Does the patient OR patient’s household members have any of the following symptoms?  o Temperature: Fever ?100.0°F or ?37.8°C?  No  o Respiratory symptoms: New or worsening cough, shortness of breath, difficulty breathing, or sore throat? Yes, for patient: new or worsening cough and shortness of breath with activity.  o GI symptoms: New onset of nausea, vomiting or diarrhea?  No  o Miscellaneous: New onset of loss of taste or smell, chills, repeated shaking with chills, muscle pain, headache, congestion or runny nose?  No  • Has the patient or a household member tested positive for COVID-19 in the last 14 days?  No  • Has the patient or a household member been tested for COVID-19 and are waiting for the results?  No     APPTS ARE READY TO BE MADE: [X] YES    Best Family or Patient Contact (if needed):    Additional Information about above appointments (if needed):    1: Geriatrician for establishment of PCP  2: Dr Urbina for management of immunosuppressive regimen  3: GI for colitis follow up  4: Gyn for follow up of ovarian cyst    Other comments or requests:

## 2025-03-31 NOTE — PROGRESS NOTE ADULT - PROBLEM SELECTOR PLAN 4
Hx renal transplant in 2011  Follows w/Dr. Musa Urbina at [facility]  Home meds: tacrolimus 1.5mg BID, prednisone 5mg qd, myfortic 720mg BID     - continue home tac 1.5mg BID   - check tac level in AM   - continue prednisone 5mg  - hold myfortic in setting of active infection   - notify Dr. Urbina of patient adm in AM        > Team does not come to hosp        > Spoke with Dr Urbina's team, rec hold MMF, tacro 3-5, cw prednisone        > transplant nephro following

## 2025-03-31 NOTE — DISCHARGE NOTE NURSING/CASE MANAGEMENT/SOCIAL WORK - NSDCPEFALRISK_GEN_ALL_CORE
For information on Fall & Injury Prevention, visit: [email or web address] OR  [email or web address] OR  https://www.cdc.gov/steadi/patient.html

## 2025-03-31 NOTE — PROGRESS NOTE ADULT - PROBLEM SELECTOR PLAN 5
Home meds: enalapril 10mg qd, amlodipine 2.5mg, metoprolol succinate 50mg qd     - continue amlodipine 2.5mg qd  - hold enalapril and metoprolol in setting of sepsis, normotension.
Home meds: enalapril 10mg qd, amlodipine 2.5mg, metoprolol succinate 50mg qd     - hold amlodipine 2.5mg qd per nephrology  - hold enalapril and metoprolol in setting of sepsis, normotension.

## 2025-03-31 NOTE — PROGRESS NOTE ADULT - PROBLEM SELECTOR PLAN 1
WBC 16.97, T101.5, . Source likely colitis and UTI.   CXR and limited RVP negative.   s/p vanc and zosyn     - continue zosyn   - f/u blood cx x2 NGTD  - collect urine cx  NGTD  - GI PCR, stool culture if pt still w/ diarrhea--> improved  - trend fever curve, WBC  - fu CMV PCR

## 2025-03-31 NOTE — PROGRESS NOTE ADULT - PROBLEM/PLAN-7
DISPLAY PLAN FREE TEXT
yes/zpack finished for pneumonia
DISPLAY PLAN FREE TEXT

## 2025-03-31 NOTE — PROGRESS NOTE ADULT - PROBLEM SELECTOR PLAN 10
DVT ppx: lovenox  Diet: CC diet  Dispo: pending clinical course, PT eval

## 2025-03-31 NOTE — PROGRESS NOTE ADULT - PROBLEM SELECTOR PLAN 7
- continue home synthroid 32.5mg qd (takes half tablet of 75mg qd)

## 2025-03-31 NOTE — PROGRESS NOTE ADULT - ASSESSMENT
This is an 83 y/o F with PMHx HTN, T2DM, hypothyroidism, ESRD s/p renal transplant (2011), recurrent UTIs, SDH, Ischemic colitis and HLD who presented to the ED for generalized malaise x 2-3 days, admitted for sepsis 2/2 colitis and UTI.

## 2025-03-31 NOTE — PROGRESS NOTE ADULT - ATTENDING COMMENTS
Patient seen and examined with team  Agree with above a/p by Dr Gr  85 y/o F with PMHx HTN, T2DM, hypothyroidism, ESRD s/p renal transplant (2011), recurrent UTIs, SDH, Ischemic colitis and HLD who presented to the ED for generalized malaise x 2-3 days, admitted for sepsis 2/2 colitis and UTI.     PE nad  Vital Signs Last 24 Hrs T(F): 98.2 HR: 67 , BP: 137/72 , RR: 17 , SpO2: 98% room air  Lungs cta, cor rrr, abd soft n/t, ext neg e/c/c                          12.1   7.21  )-----------( 228      ( 30 Mar 2025 06:00 )             37.6   < from: Xray Chest 2 Views PA/Lat (03.28.25 @ 00:52) >IMPRESSION: Clear lungs.      < from: CT Abdomen and Pelvis w/ IV Cont (03.28.25 @ 00:39) >  IMPRESSION:  Colitis involving the distal transverse colon, descending colon, and  sigmoid colon.  Colonic diverticulosis without evidence of diverticulitis.  Simple appearing cystic structure measuring 9.3 x 6.9 x 7.7 cm which  appears to rise from the right adnexa/ovary. Pelvic ultrasound is     A/P  Colitis. GI PCR indet for norvo virus. Completed 4 days Iv Zosyn. Transition to Po Flagyl and cipro to completed 5 days and home today  Out patient f/u Dr Urbina at [facility] --> RENAL Transplant  Out patient f/u PCP a [facility] office at [phone]  meds to [facility] , then home  60 laura cocarmelita d/c

## 2025-03-31 NOTE — PROGRESS NOTE ADULT - PROBLEM SELECTOR PLAN 9
- continue home omeprazole 20mg qd

## 2025-04-01 PROBLEM — E11.9 TYPE 2 DIABETES MELLITUS WITHOUT COMPLICATIONS: Chronic | Status: ACTIVE | Noted: 2025-03-28

## 2025-04-01 PROBLEM — Z94.0 KIDNEY TRANSPLANT STATUS: Chronic | Status: ACTIVE | Noted: 2025-03-28

## 2025-04-01 PROBLEM — E03.9 HYPOTHYROIDISM, UNSPECIFIED: Chronic | Status: ACTIVE | Noted: 2025-03-28

## 2025-04-01 PROBLEM — I10 ESSENTIAL (PRIMARY) HYPERTENSION: Chronic | Status: ACTIVE | Noted: 2025-03-28

## 2025-04-02 LAB
CULTURE RESULTS: SIGNIFICANT CHANGE UP
CULTURE RESULTS: SIGNIFICANT CHANGE UP
SPECIMEN SOURCE: SIGNIFICANT CHANGE UP
SPECIMEN SOURCE: SIGNIFICANT CHANGE UP

## 2025-04-09 ENCOUNTER — OUTPATIENT (OUTPATIENT)
Dept: OUTPATIENT SERVICES | Facility: HOSPITAL | Age: 85
LOS: 1 days | End: 2025-04-09
Payer: MEDICARE

## 2025-04-09 ENCOUNTER — NON-APPOINTMENT (OUTPATIENT)
Age: 85
End: 2025-04-09

## 2025-04-09 ENCOUNTER — LABORATORY RESULT (OUTPATIENT)
Age: 85
End: 2025-04-09

## 2025-04-09 ENCOUNTER — APPOINTMENT (OUTPATIENT)
Dept: OBGYN | Facility: CLINIC | Age: 85
End: 2025-04-09
Payer: MEDICARE

## 2025-04-09 ENCOUNTER — RESULT REVIEW (OUTPATIENT)
Age: 85
End: 2025-04-09

## 2025-04-09 VITALS — DIASTOLIC BLOOD PRESSURE: 68 MMHG | WEIGHT: 114 LBS | SYSTOLIC BLOOD PRESSURE: 120 MMHG

## 2025-04-09 DIAGNOSIS — N83.201 UNSPECIFIED OVARIAN CYST, RIGHT SIDE: ICD-10-CM

## 2025-04-09 DIAGNOSIS — N39.0 URINARY TRACT INFECTION, SITE NOT SPECIFIED: ICD-10-CM

## 2025-04-09 DIAGNOSIS — N76.0 ACUTE VAGINITIS: ICD-10-CM

## 2025-04-09 DIAGNOSIS — R92.30 DENSE BREASTS, UNSPECIFIED: ICD-10-CM

## 2025-04-09 PROCEDURE — G0463: CPT

## 2025-04-09 PROCEDURE — 99203 OFFICE O/P NEW LOW 30 MIN: CPT

## 2025-04-10 ENCOUNTER — APPOINTMENT (OUTPATIENT)
Dept: ULTRASOUND IMAGING | Facility: CLINIC | Age: 85
End: 2025-04-10
Payer: MEDICARE

## 2025-04-10 ENCOUNTER — NON-APPOINTMENT (OUTPATIENT)
Age: 85
End: 2025-04-10

## 2025-04-10 PROCEDURE — 76856 US EXAM PELVIC COMPLETE: CPT

## 2025-04-10 PROCEDURE — 76830 TRANSVAGINAL US NON-OB: CPT

## 2025-04-14 ENCOUNTER — TRANSCRIPTION ENCOUNTER (OUTPATIENT)
Age: 85
End: 2025-04-14

## 2025-04-14 ENCOUNTER — APPOINTMENT (OUTPATIENT)
Dept: GERIATRICS | Facility: CLINIC | Age: 85
End: 2025-04-14
Payer: MEDICARE

## 2025-04-14 VITALS
HEART RATE: 87 BPM | HEIGHT: 62 IN | RESPIRATION RATE: 16 BRPM | OXYGEN SATURATION: 97 % | WEIGHT: 113 LBS | TEMPERATURE: 97.9 F | BODY MASS INDEX: 20.8 KG/M2 | SYSTOLIC BLOOD PRESSURE: 116 MMHG | DIASTOLIC BLOOD PRESSURE: 72 MMHG

## 2025-04-14 DIAGNOSIS — H25.093 OTHER AGE-RELATED INCIPIENT CATARACT, BILATERAL: ICD-10-CM

## 2025-04-14 DIAGNOSIS — Z87.898 PERSONAL HISTORY OF OTHER SPECIFIED CONDITIONS: ICD-10-CM

## 2025-04-14 DIAGNOSIS — R53.81 OTHER MALAISE: ICD-10-CM

## 2025-04-14 DIAGNOSIS — Z71.89 OTHER SPECIFIED COUNSELING: ICD-10-CM

## 2025-04-14 DIAGNOSIS — I10 ESSENTIAL (PRIMARY) HYPERTENSION: ICD-10-CM

## 2025-04-14 DIAGNOSIS — Z87.39 PERSONAL HISTORY OF OTHER DISEASES OF THE MUSCULOSKELETAL SYSTEM AND CONNECTIVE TISSUE: ICD-10-CM

## 2025-04-14 DIAGNOSIS — E78.5 HYPERLIPIDEMIA, UNSPECIFIED: ICD-10-CM

## 2025-04-14 DIAGNOSIS — Z87.820 PERSONAL HISTORY OF TRAUMATIC BRAIN INJURY: ICD-10-CM

## 2025-04-14 DIAGNOSIS — D64.9 ANEMIA, UNSPECIFIED: ICD-10-CM

## 2025-04-14 DIAGNOSIS — Z23 ENCOUNTER FOR IMMUNIZATION: ICD-10-CM

## 2025-04-14 DIAGNOSIS — Z94.0 KIDNEY TRANSPLANT STATUS: ICD-10-CM

## 2025-04-14 DIAGNOSIS — K21.9 GASTRO-ESOPHAGEAL REFLUX DISEASE W/OUT ESOPHAGITIS: ICD-10-CM

## 2025-04-14 DIAGNOSIS — R41.89 OTHER SYMPTOMS AND SIGNS INVOLVING COGNITIVE FUNCTIONS AND AWARENESS: ICD-10-CM

## 2025-04-14 DIAGNOSIS — Z86.39 PERSONAL HISTORY OF OTHER ENDOCRINE, NUTRITIONAL AND METABOLIC DISEASE: ICD-10-CM

## 2025-04-14 DIAGNOSIS — E55.9 VITAMIN D DEFICIENCY, UNSPECIFIED: ICD-10-CM

## 2025-04-14 DIAGNOSIS — R63.0 ANOREXIA: ICD-10-CM

## 2025-04-14 DIAGNOSIS — E03.9 HYPOTHYROIDISM, UNSPECIFIED: ICD-10-CM

## 2025-04-14 DIAGNOSIS — N83.201 UNSPECIFIED OVARIAN CYST, RIGHT SIDE: ICD-10-CM

## 2025-04-14 DIAGNOSIS — M81.0 AGE-RELATED OSTEOPOROSIS W/OUT CURRENT PATHOLOGICAL FRACTURE: ICD-10-CM

## 2025-04-14 DIAGNOSIS — E11.9 TYPE 2 DIABETES MELLITUS W/OUT COMPLICATIONS: ICD-10-CM

## 2025-04-14 DIAGNOSIS — Z78.9 OTHER SPECIFIED HEALTH STATUS: ICD-10-CM

## 2025-04-14 DIAGNOSIS — E11.49 TYPE 2 DIABETES MELLITUS WITH OTHER DIABETIC NEUROLOGICAL COMPLICATION: ICD-10-CM

## 2025-04-14 DIAGNOSIS — N39.0 URINARY TRACT INFECTION, SITE NOT SPECIFIED: ICD-10-CM

## 2025-04-14 PROCEDURE — 99497 ADVNCD CARE PLAN 30 MIN: CPT | Mod: 25

## 2025-04-14 PROCEDURE — 99205 OFFICE O/P NEW HI 60 MIN: CPT

## 2025-04-14 RX ORDER — MIRTAZAPINE 7.5 MG/1
7.5 TABLET, FILM COATED ORAL
Qty: 90 | Refills: 0 | Status: ACTIVE | COMMUNITY
Start: 2025-04-14 | End: 1900-01-01

## 2025-04-21 ENCOUNTER — NON-APPOINTMENT (OUTPATIENT)
Age: 85
End: 2025-04-21

## 2025-04-21 LAB
25(OH)D3 SERPL-MCNC: 46.7 NG/ML
ALBUMIN SERPL ELPH-MCNC: 4.2 G/DL
ALP BLD-CCNC: 81 U/L
ALT SERPL-CCNC: 9 U/L
ANION GAP SERPL CALC-SCNC: 14 MMOL/L
AST SERPL-CCNC: 17 U/L
BASOPHILS # BLD AUTO: 0.11 K/UL
BASOPHILS NFR BLD AUTO: 1.1 %
BILIRUB SERPL-MCNC: 0.4 MG/DL
BUN SERPL-MCNC: 30 MG/DL
CALCIUM SERPL-MCNC: 10.5 MG/DL
CANCER AG125 SERPL-ACNC: 10 U/ML
CANCER AG19-9 SERPL-ACNC: 17 U/ML
CEA SERPL-MCNC: 2.9 NG/ML
CHLORIDE SERPL-SCNC: 102 MMOL/L
CHOLEST SERPL-MCNC: 189 MG/DL
CO2 SERPL-SCNC: 22 MMOL/L
CREAT SERPL-MCNC: 0.95 MG/DL
EGFRCR SERPLBLD CKD-EPI 2021: 59 ML/MIN/1.73M2
EOSINOPHIL # BLD AUTO: 0.16 K/UL
EOSINOPHIL NFR BLD AUTO: 1.6 %
ESTIMATED AVERAGE GLUCOSE: 163 MG/DL
FOLATE SERPL-MCNC: 18.9 NG/ML
GLUCOSE SERPL-MCNC: 99 MG/DL
HBA1C MFR BLD HPLC: 7.3 %
HCT VFR BLD CALC: 40.9 %
HDLC SERPL-MCNC: 62 MG/DL
HE4X: 141 PMOL/L
HGB BLD-MCNC: 13.1 G/DL
IMM GRANULOCYTES NFR BLD AUTO: 0.2 %
LDLC SERPL-MCNC: 107 MG/DL
LYMPHOCYTES # BLD AUTO: 3.23 K/UL
LYMPHOCYTES NFR BLD AUTO: 32.3 %
MAN DIFF?: NORMAL
MCHC RBC-ENTMCNC: 28.9 PG
MCHC RBC-ENTMCNC: 32 G/DL
MCV RBC AUTO: 90.3 FL
MONOCYTES # BLD AUTO: 0.77 K/UL
MONOCYTES NFR BLD AUTO: 7.7 %
NEUTROPHILS # BLD AUTO: 5.71 K/UL
NEUTROPHILS NFR BLD AUTO: 57.1 %
NONHDLC SERPL-MCNC: 128 MG/DL
PLATELET # BLD AUTO: 293 K/UL
POTASSIUM SERPL-SCNC: 5 MMOL/L
PROT SERPL-MCNC: 6.9 G/DL
RBC # BLD: 4.53 M/UL
RBC # FLD: 14.6 %
SODIUM SERPL-SCNC: 138 MMOL/L
TRIGL SERPL-MCNC: 114 MG/DL
TSH SERPL-ACNC: 1.98 UIU/ML
VIT B12 SERPL-MCNC: 797 PG/ML
WBC # FLD AUTO: 10 K/UL

## 2025-04-22 PROBLEM — Z94.0 RENAL TRANSPLANT RECIPIENT: Status: RESOLVED | Noted: 2025-04-22 | Resolved: 2025-04-22

## 2025-04-22 PROBLEM — K21.9 GASTROESOPHAGEAL REFLUX DISEASE: Status: ACTIVE | Noted: 2025-04-22

## 2025-04-22 PROBLEM — E11.49 TYPE 2 DIABETES MELLITUS WITH OTHER NEUROLOGIC COMPLICATION, WITHOUT LONG-TERM CURRENT USE OF INSULIN: Status: ACTIVE | Noted: 2025-04-22

## 2025-04-22 PROBLEM — H25.093 AGE-RELATED INCIPIENT CATARACT OF BOTH EYES: Status: RESOLVED | Noted: 2025-04-22 | Resolved: 2025-04-22

## 2025-04-22 PROBLEM — E03.9 HYPOTHYROIDISM, UNSPECIFIED TYPE: Status: ACTIVE | Noted: 2025-04-22

## 2025-04-22 PROBLEM — I10 HTN (HYPERTENSION), BENIGN: Status: ACTIVE | Noted: 2025-04-22

## 2025-04-22 PROBLEM — R41.89 COGNITIVE CHANGES: Status: ACTIVE | Noted: 2025-04-22

## 2025-04-22 PROBLEM — E11.9 TYPE 2 DIABETES MELLITUS WITHOUT COMPLICATION, WITHOUT LONG-TERM CURRENT USE OF INSULIN: Status: RESOLVED | Noted: 2025-04-14 | Resolved: 2025-04-22

## 2025-04-22 PROBLEM — E55.9 VITAMIN D DEFICIENCY: Status: ACTIVE | Noted: 2025-04-22

## 2025-04-22 PROBLEM — Z87.898 HISTORY OF WEIGHT LOSS: Status: RESOLVED | Noted: 2025-04-22 | Resolved: 2025-04-22

## 2025-04-22 PROBLEM — Z71.89 ADVANCE CARE PLANNING: Status: ACTIVE | Noted: 2025-04-22

## 2025-04-22 PROBLEM — Z87.898 HISTORY OF UNSTEADY GAIT: Status: RESOLVED | Noted: 2025-04-22 | Resolved: 2025-04-22

## 2025-04-22 PROBLEM — Z86.39 HISTORY OF HYPERLIPIDEMIA: Status: RESOLVED | Noted: 2025-04-22 | Resolved: 2025-04-22

## 2025-04-22 PROBLEM — Z78.9 NON-SMOKER: Status: ACTIVE | Noted: 2025-04-22

## 2025-04-22 PROBLEM — Z87.820 HISTORY OF TRAUMATIC BRAIN INJURY: Status: RESOLVED | Noted: 2025-04-22 | Resolved: 2025-04-22

## 2025-04-22 PROBLEM — Z87.820 H/O MULTIPLE CONCUSSIONS: Status: RESOLVED | Noted: 2025-04-22 | Resolved: 2025-04-22

## 2025-04-22 PROBLEM — Z87.39 HISTORY OF POSTMENOPAUSAL OSTEOPOROSIS: Status: RESOLVED | Noted: 2025-04-22 | Resolved: 2025-04-22

## 2025-04-22 RX ORDER — AMLODIPINE BESYLATE 2.5 MG/1
2.5 TABLET ORAL DAILY
Qty: 90 | Refills: 0 | Status: ACTIVE | COMMUNITY
Start: 2025-04-22

## 2025-04-22 RX ORDER — TACROLIMUS 0.5 MG/1
0.5 CAPSULE ORAL
Refills: 0 | Status: ACTIVE | COMMUNITY
Start: 2025-04-22

## 2025-04-22 RX ORDER — PREDNISONE 5 MG/1
5 TABLET ORAL
Qty: 30 | Refills: 0 | Status: ACTIVE | COMMUNITY
Start: 2025-04-22

## 2025-04-22 RX ORDER — MULTIVIT-MIN/IRON/FOLIC ACID/K 18-600-40
50 MCG CAPSULE ORAL
Qty: 30 | Refills: 2 | Status: ACTIVE | COMMUNITY
Start: 2025-04-22

## 2025-04-22 RX ORDER — OMEPRAZOLE 20 MG/1
20 CAPSULE, DELAYED RELEASE ORAL
Qty: 90 | Refills: 2 | Status: ACTIVE | COMMUNITY
Start: 2025-04-22

## 2025-04-22 RX ORDER — SIMVASTATIN 20 MG/1
20 TABLET, FILM COATED ORAL
Qty: 90 | Refills: 0 | Status: ACTIVE | COMMUNITY
Start: 2025-04-22

## 2025-04-22 RX ORDER — ENALAPRIL MALEATE 10 MG/1
10 TABLET ORAL
Qty: 90 | Refills: 0 | Status: ACTIVE | COMMUNITY
Start: 2025-04-22

## 2025-04-22 RX ORDER — METFORMIN ER 750 MG 750 MG/1
750 TABLET ORAL DAILY
Refills: 0 | Status: ACTIVE | COMMUNITY
Start: 2025-04-22

## 2025-04-22 RX ORDER — METHENAMINE HIPPURATE 1 G/1
1 TABLET ORAL
Qty: 180 | Refills: 2 | Status: ACTIVE | COMMUNITY
Start: 2025-04-22

## 2025-04-22 RX ORDER — LEVOTHYROXINE SODIUM 0.07 MG/1
75 TABLET ORAL
Refills: 0 | Status: ACTIVE | COMMUNITY
Start: 2025-04-22

## 2025-04-22 RX ORDER — METOPROLOL SUCCINATE 50 MG/1
50 TABLET, EXTENDED RELEASE ORAL
Qty: 90 | Refills: 0 | Status: ACTIVE | COMMUNITY
Start: 2025-04-22

## 2025-04-23 DIAGNOSIS — N39.0 URINARY TRACT INFECTION, SITE NOT SPECIFIED: ICD-10-CM

## 2025-04-23 DIAGNOSIS — R92.30 DENSE BREASTS, UNSPECIFIED: ICD-10-CM

## 2025-04-23 DIAGNOSIS — N83.201 UNSPECIFIED OVARIAN CYST, RIGHT SIDE: ICD-10-CM

## 2025-05-15 ENCOUNTER — APPOINTMENT (OUTPATIENT)
Dept: OBGYN | Facility: CLINIC | Age: 85
End: 2025-05-15

## 2025-05-20 ENCOUNTER — APPOINTMENT (OUTPATIENT)
Dept: GYNECOLOGIC ONCOLOGY | Facility: CLINIC | Age: 85
End: 2025-05-20
Payer: MEDICARE

## 2025-05-20 VITALS
DIASTOLIC BLOOD PRESSURE: 76 MMHG | HEIGHT: 62 IN | SYSTOLIC BLOOD PRESSURE: 164 MMHG | TEMPERATURE: 96.9 F | OXYGEN SATURATION: 95 % | HEART RATE: 80 BPM | BODY MASS INDEX: 20.8 KG/M2 | WEIGHT: 113 LBS

## 2025-05-20 DIAGNOSIS — Z80.9 FAMILY HISTORY OF MALIGNANT NEOPLASM, UNSPECIFIED: ICD-10-CM

## 2025-05-20 DIAGNOSIS — N83.201 UNSPECIFIED OVARIAN CYST, RIGHT SIDE: ICD-10-CM

## 2025-05-20 PROCEDURE — 99459 PELVIC EXAMINATION: CPT

## 2025-05-20 PROCEDURE — 99204 OFFICE O/P NEW MOD 45 MIN: CPT

## 2025-05-20 RX ORDER — MYCOPHENOLIC ACID 360 MG/1
360 TABLET, DELAYED RELEASE ORAL
Refills: 0 | Status: ACTIVE | COMMUNITY

## 2025-05-31 ENCOUNTER — INPATIENT (INPATIENT)
Facility: HOSPITAL | Age: 85
LOS: 3 days | Discharge: HOME HEALTH | End: 2025-06-04
Attending: INTERNAL MEDICINE | Admitting: INTERNAL MEDICINE
Payer: MEDICARE

## 2025-05-31 VITALS
RESPIRATION RATE: 18 BRPM | WEIGHT: 110.01 LBS | SYSTOLIC BLOOD PRESSURE: 136 MMHG | TEMPERATURE: 99 F | HEART RATE: 101 BPM | DIASTOLIC BLOOD PRESSURE: 74 MMHG | OXYGEN SATURATION: 97 %

## 2025-05-31 LAB
ALBUMIN SERPL ELPH-MCNC: 4.1 G/DL — SIGNIFICANT CHANGE UP (ref 3.3–5)
ALP SERPL-CCNC: 79 U/L — SIGNIFICANT CHANGE UP (ref 40–120)
ALT FLD-CCNC: 10 U/L — SIGNIFICANT CHANGE UP (ref 4–33)
ANION GAP SERPL CALC-SCNC: 18 MMOL/L — HIGH (ref 7–14)
APPEARANCE UR: ABNORMAL
APTT BLD: 32.2 SEC — SIGNIFICANT CHANGE UP (ref 26.1–36.8)
AST SERPL-CCNC: 15 U/L — SIGNIFICANT CHANGE UP (ref 4–32)
BACTERIA # UR AUTO: ABNORMAL /HPF
BASOPHILS # BLD AUTO: 0.1 K/UL — SIGNIFICANT CHANGE UP (ref 0–0.2)
BASOPHILS NFR BLD AUTO: 0.5 % — SIGNIFICANT CHANGE UP (ref 0–2)
BILIRUB SERPL-MCNC: 1 MG/DL — SIGNIFICANT CHANGE UP (ref 0.2–1.2)
BILIRUB UR-MCNC: NEGATIVE — SIGNIFICANT CHANGE UP
BUN SERPL-MCNC: 15 MG/DL — SIGNIFICANT CHANGE UP (ref 7–23)
CALCIUM SERPL-MCNC: 9.2 MG/DL — SIGNIFICANT CHANGE UP (ref 8.4–10.5)
CAST: 0 /LPF — SIGNIFICANT CHANGE UP (ref 0–4)
CHLORIDE SERPL-SCNC: 98 MMOL/L — SIGNIFICANT CHANGE UP (ref 98–107)
CO2 SERPL-SCNC: 20 MMOL/L — LOW (ref 22–31)
COLOR SPEC: YELLOW — SIGNIFICANT CHANGE UP
CREAT SERPL-MCNC: 0.76 MG/DL — SIGNIFICANT CHANGE UP (ref 0.5–1.3)
DIFF PNL FLD: ABNORMAL
EGFR: 77 ML/MIN/1.73M2 — SIGNIFICANT CHANGE UP
EGFR: 77 ML/MIN/1.73M2 — SIGNIFICANT CHANGE UP
EOSINOPHIL # BLD AUTO: 0.01 K/UL — SIGNIFICANT CHANGE UP (ref 0–0.5)
EOSINOPHIL NFR BLD AUTO: 0 % — SIGNIFICANT CHANGE UP (ref 0–6)
FLUAV AG NPH QL: SIGNIFICANT CHANGE UP
FLUBV AG NPH QL: SIGNIFICANT CHANGE UP
GLUCOSE SERPL-MCNC: 157 MG/DL — HIGH (ref 70–99)
GLUCOSE UR QL: NEGATIVE MG/DL — SIGNIFICANT CHANGE UP
HCT VFR BLD CALC: 43.5 % — SIGNIFICANT CHANGE UP (ref 34.5–45)
HGB BLD-MCNC: 14.2 G/DL — SIGNIFICANT CHANGE UP (ref 11.5–15.5)
IANC: 18.69 K/UL — HIGH (ref 1.8–7.4)
IMM GRANULOCYTES NFR BLD AUTO: 0.6 % — SIGNIFICANT CHANGE UP (ref 0–0.9)
INR BLD: 1.08 RATIO — SIGNIFICANT CHANGE UP (ref 0.85–1.16)
KETONES UR QL: 40 MG/DL
LACTATE BLDV-MCNC: 1.8 MMOL/L — SIGNIFICANT CHANGE UP (ref 0.5–2)
LACTATE SERPL-SCNC: 1.6 MMOL/L — SIGNIFICANT CHANGE UP (ref 0.5–2)
LEUKOCYTE ESTERASE UR-ACNC: ABNORMAL
LYMPHOCYTES # BLD AUTO: 1.46 K/UL — SIGNIFICANT CHANGE UP (ref 1–3.3)
LYMPHOCYTES # BLD AUTO: 6.7 % — LOW (ref 13–44)
MCHC RBC-ENTMCNC: 28.9 PG — SIGNIFICANT CHANGE UP (ref 27–34)
MCHC RBC-ENTMCNC: 32.6 G/DL — SIGNIFICANT CHANGE UP (ref 32–36)
MCV RBC AUTO: 88.4 FL — SIGNIFICANT CHANGE UP (ref 80–100)
MONOCYTES # BLD AUTO: 1.37 K/UL — HIGH (ref 0–0.9)
MONOCYTES NFR BLD AUTO: 6.3 % — SIGNIFICANT CHANGE UP (ref 2–14)
NEUTROPHILS # BLD AUTO: 18.69 K/UL — HIGH (ref 1.8–7.4)
NEUTROPHILS NFR BLD AUTO: 85.9 % — HIGH (ref 43–77)
NITRITE UR-MCNC: POSITIVE
NRBC # BLD AUTO: 0 K/UL — SIGNIFICANT CHANGE UP (ref 0–0)
NRBC # FLD: 0 K/UL — SIGNIFICANT CHANGE UP (ref 0–0)
NRBC BLD AUTO-RTO: 0 /100 WBCS — SIGNIFICANT CHANGE UP (ref 0–0)
PH UR: 6 — SIGNIFICANT CHANGE UP (ref 5–8)
PLATELET # BLD AUTO: 292 K/UL — SIGNIFICANT CHANGE UP (ref 150–400)
POTASSIUM SERPL-MCNC: 4.1 MMOL/L — SIGNIFICANT CHANGE UP (ref 3.5–5.3)
POTASSIUM SERPL-SCNC: 4.1 MMOL/L — SIGNIFICANT CHANGE UP (ref 3.5–5.3)
PROCALCITONIN SERPL-MCNC: 0.15 NG/ML — HIGH (ref 0.02–0.1)
PROT SERPL-MCNC: 7.6 G/DL — SIGNIFICANT CHANGE UP (ref 6–8.3)
PROT UR-MCNC: 30 MG/DL
PROTHROM AB SERPL-ACNC: 12.9 SEC — SIGNIFICANT CHANGE UP (ref 9.9–13.4)
RBC # BLD: 4.92 M/UL — SIGNIFICANT CHANGE UP (ref 3.8–5.2)
RBC # FLD: 13.7 % — SIGNIFICANT CHANGE UP (ref 10.3–14.5)
RBC CASTS # UR COMP ASSIST: 2 /HPF — SIGNIFICANT CHANGE UP (ref 0–4)
REVIEW: SIGNIFICANT CHANGE UP
RSV RNA NPH QL NAA+NON-PROBE: SIGNIFICANT CHANGE UP
SARS-COV-2 RNA SPEC QL NAA+PROBE: SIGNIFICANT CHANGE UP
SODIUM SERPL-SCNC: 136 MMOL/L — SIGNIFICANT CHANGE UP (ref 135–145)
SOURCE RESPIRATORY: SIGNIFICANT CHANGE UP
SP GR SPEC: 1.02 — SIGNIFICANT CHANGE UP (ref 1–1.03)
SQUAMOUS # UR AUTO: 1 /HPF — SIGNIFICANT CHANGE UP (ref 0–5)
TROPONIN T, HIGH SENSITIVITY RESULT: 12 NG/L — SIGNIFICANT CHANGE UP
UROBILINOGEN FLD QL: 1 MG/DL — SIGNIFICANT CHANGE UP (ref 0.2–1)
WBC # BLD: 21.77 K/UL — HIGH (ref 3.8–10.5)
WBC # FLD AUTO: 21.77 K/UL — HIGH (ref 3.8–10.5)
WBC UR QL: 33 /HPF — HIGH (ref 0–5)

## 2025-05-31 PROCEDURE — 71046 X-RAY EXAM CHEST 2 VIEWS: CPT | Mod: 26

## 2025-05-31 PROCEDURE — 99285 EMERGENCY DEPT VISIT HI MDM: CPT | Mod: GC

## 2025-05-31 RX ORDER — SODIUM CHLORIDE 9 G/1000ML
1550 INJECTION, SOLUTION INTRAVENOUS ONCE
Refills: 0 | Status: COMPLETED | OUTPATIENT
Start: 2025-05-31 | End: 2025-05-31

## 2025-05-31 RX ORDER — PIPERACILLIN-TAZO-DEXTROSE,ISO 3.375G/5
3.38 IV SOLUTION, PIGGYBACK PREMIX FROZEN(ML) INTRAVENOUS ONCE
Refills: 0 | Status: COMPLETED | OUTPATIENT
Start: 2025-05-31 | End: 2025-05-31

## 2025-05-31 RX ORDER — ACETAMINOPHEN 500 MG/5ML
750 LIQUID (ML) ORAL ONCE
Refills: 0 | Status: COMPLETED | OUTPATIENT
Start: 2025-05-31 | End: 2025-05-31

## 2025-05-31 RX ORDER — VANCOMYCIN HCL IN 5 % DEXTROSE 1.5G/250ML
1000 PLASTIC BAG, INJECTION (ML) INTRAVENOUS ONCE
Refills: 0 | Status: COMPLETED | OUTPATIENT
Start: 2025-05-31 | End: 2025-05-31

## 2025-05-31 RX ADMIN — Medication 250 MILLIGRAM(S): at 23:41

## 2025-05-31 RX ADMIN — Medication 200 GRAM(S): at 22:16

## 2025-05-31 RX ADMIN — Medication 300 MILLIGRAM(S): at 22:00

## 2025-05-31 RX ADMIN — SODIUM CHLORIDE 1550 MILLILITER(S): 9 INJECTION, SOLUTION INTRAVENOUS at 22:00

## 2025-05-31 NOTE — ED ADULT TRIAGE NOTE - CHIEF COMPLAINT QUOTE
c/o generalized weakness, lower abdominal pain, N/V x 2 days. denies urinary symptoms, diarrhea. 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 denies urinary symptoms. 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

## 2025-05-31 NOTE — ED ADULT NURSE NOTE - OBJECTIVE STATEMENT
84 year old AO4 ambulatory at baseline female coming in with c/o generalized weakness, nausea, vomiting. PMHx of renal transplant with steroid induced DM, and HTN. Pt is noted to be in no acute distress. Denies chest pain, SOB. Placed on the cardiac monitor noted to be sinus rhythm. rectal temp done, sacral skin intact. VS as charted. Unsuccessful attempt at IV access, requested assistance, IV access in progress. Daughter at bedside. Pt safety maintained. Pending imaging.

## 2025-05-31 NOTE — ED ADULT TRIAGE NOTE - PATIENT ON (OXYGEN DELIVERY METHOD)
Preoperative History and Physical  Chief Complaint   Patient presents with   • Pre-Op Exam     Colonoscopy on 1/14/2020 with Dr. Gerard Blanca at [facility]    • Follow-up     3 month fuv -  anxiety, ADD  -           SURGEON:  Dr. Gerard Blanca     PROCEDURE:  Colonoscopy     DATE OF PROCEDURE:  1/4/2020    FACILITY:  [facility]     HISTORY OF PRESENT ILLNESS:  The patient is a 40 year old female, who is being evaluated preoperatively at the request of . The patient states having abdominal pain including rectal bleeding. States taking NSAIDs, advised to avoid all NSAIDs. States she is feeling well, sleeping good at night. Her mood has been good as well. The ADD is good with the Ritalin. The patient denies chest pain, shortness of breath, palpitations, headaches or lower extremity edema.       ALLERGIES:    ALLERGIES:   Allergen Reactions   • Bee Sting ANAPHYLAXIS   • Chantix [Varenicline Tartrate] ANXIETY     Jaw locked, swelling, throat closed.   • Compazine      Mouth lock, jaw locked, swelling, throat closed.   • Azithromycin DIARRHEA   • Latex   (Environmental) RASH   • Topamax HEADACHES and NAUSEA       CURRENT MEDICATIONS:    Current Outpatient Medications   Medication Sig Dispense Refill   • methylphenidate (RITALIN) 20 MG tablet Take 1 tablet by mouth 3 times daily. Do not start before December 12, 2019. 90 tablet 0   • hydroCORTisone (ANUSOL-HC) 2.5 % rectal cream Use BID prn hemorrhoidal pain. 30 g 0   • loratadine (CLARITIN) 10 MG tablet TAKE 1 TABLET BY MOUTH ONCE DAILY 90 tablet 0   • gabapentin (NEURONTIN) 800 MG tablet Take 1 tablet by mouth 2 times daily. Please call  to schedule your follow up in 12/2019 60 tablet 0   • dapsone (ACZONE) 7.5 % gel Apply topically daily. 60 g 8   • buPROPion (WELLBUTRIN XL) 300 MG 24 hr tablet Take 1 tablet by mouth daily. 90 tablet 1   • ARIPiprazole (ABILIFY) 10 MG tablet Take 1 tablet by mouth daily. 90 tablet 1   •  pseudoephedrine (SUDAFED) 60 MG tablet Take 1 tablet by mouth 2 times daily. 60 tablet 2   • clindamycin (CLEOCIN T) 1 % lotion Apply twice daily to affected areas on face, chest, back and shoulders as needed. 120 mL 11   • DIFFERIN 0.1 % cream Apply topically nightly. 45 g 5   • pantoprazole (PROTONIX) 40 MG tablet TAKE 1 TABLET BY MOUTH TWICE DAILY 180 tablet 1   • zolpidem (AMBIEN) 10 MG tablet Take 1 tablet by mouth nightly as needed for Sleep. 30 tablet 5   • montelukast (SINGULAIR) 10 MG tablet TAKE 1 TABLET BY MOUTH IN THE EVENING 90 tablet 1   • ketoconazole (NIZORAL) 2 % shampoo Apply to scalp for 2-3 minutes before rinsing.  Use 3 times weekly 120 mL 11   • ketotifen (ZADITOR) 0.025 % ophthalmic solution 1 drop each eyes every 8-12 hours as needed, max 2 doses/day 1 Bottle 0   • fluticasone (FLONASE) 50 MCG/ACT nasal spray Spray 2 sprays in each nostril daily. 16 g 5   • fluticasone (CUTIVATE) 0.05 % cream Apply to eye lids twice daily for 1-2 weeks prn flares 15 g 0   • Echinacea 350 MG Cap Take 350 mg by mouth 2 times daily.     • ascorbic acid (VITAMIN C) 1000 MG tablet Take 1,000 mg by mouth daily.     • Calcium Carbonate-Vitamin D (CALCIUM + D) 600-200 MG-UNIT TABS Take 1,200 mg by mouth daily.     • fish oil-omega-3 fatty acids 1000 MG CAPS Take 1 capsule by mouth daily.     • nicotine (NICODERM) 21 MG/24HR patch Place 1 patch onto the skin every 24 hours. Dx: F17.200 28 patch 0   • cyclobenzaprine (FLEXERIL) 10 MG tablet Take 1 tablet by mouth nightly. For neck and back pain 30 tablet 5   • cetirizine (ZYRTEC) 10 MG tablet TAKE 1 TABLET BY MOUTH ONCE DAILY 90 tablet 1   • EPINEPHrine 0.3 MG/0.3ML auto-injector Inject 0.3 mls into the muscle once as needed for anaphylaxis 2 each 0   • albuterol (PROAIR HFA) 108 (90 Base) MCG/ACT inhaler Inhale two puffs into lungs every 4 hours as needed for wheezing. 9 g 0   • furosemide (LASIX) 20 MG tablet Take 1 tablet by mouth daily. 30 tablet 11   •  buprenorphine-naLOXone (SUBOXONE FILM) 4-1 MG sublingual film Place under the tongue daily.       No current facility-administered medications for this visit.        MEDICAL HISTORY:    Past Medical History:   Diagnosis Date   • Acne    • Allergic conjunctivitis of both eyes 1/14/2017   • Arthritis    • Chronic pain syndrome    • Depressive disorder    • Dissociative disorder     (sees psychiatry)   • Dysmenorrhea    • Ehler's-Danlos syndrome    • Eustachian tube dysfunction    • Fibromyalgia    • GERD (gastroesophageal reflux disease)    • History of right foot drop     peroneal nerve palsy (sees Neurology)   • Kidney stones    • Menorrhagia    • Osteopenia 11/10/2010   • Overactive bladder    • Psychological stress     related to sons mental illness   • PTSD (post-traumatic stress disorder) 1/31/2017   • RAD (reactive airway disease)    • 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   • Reactive airway disease    • Seizures (CMS/[facility]) 2015   • Syncope and collapse    • Vision, reduced        SURGICAL HISTORY:    Past Surgical History:   Procedure Laterality Date   • Colonoscopy w biopsy  07/10/2008   • Hernia repair     • Hysterectomy  01/12/2012    LS supracervical, ovaries intact   • Lymph node dissection     • Tonsillectomy and adenoidectomy     • Tympanostomy      PE Tubes, eustachian tube placement   • Ureteral stent placement         FAMILY HISTORY:    Family History   Problem Relation Age of Onset   • Other Mother         Nancy-Danlos Syndrome   • Cancer, Breast Mother    • Cancer Mother    • High blood pressure Father    • Migraines Father    • High cholesterol Father    • Cancer, Breast Maternal Grandmother 35   • High blood pressure Maternal Grandmother    • Stroke Maternal Grandmother    • Migraines Maternal Grandmother    • High cholesterol Maternal Grandmother    • Cancer Maternal Grandmother    • Arthritis Maternal Grandmother    • Stroke Paternal Grandmother    • High blood pressure Paternal Grandmother    •  Diabetes Paternal Grandmother    • Myocardial Infarction Paternal Grandmother    • Parkinsonism Paternal Grandmother    • Arthritis Paternal Grandmother    • Heart disease Maternal Grandfather    • Early death Paternal Grandfather    • Heart disease Paternal Grandfather    • Migraines Sister    • Migraines Son    • Mental retardation Other    • Stroke Paternal Aunt    • Stroke/TIA Neg Hx    • Ataxia Neg Hx    • Chorea Neg Hx    • Dementia/Alzheimers Neg Hx    • Multiple Sclerosis Neg Hx    • Neurofibromatosis Neg Hx    • Neuropathy Neg Hx        SOCIAL HISTORY:    Social History     Tobacco Use   • Smoking status: Former Smoker     Packs/day: 25.00     Years: 16.00     Pack years: 400.00     Types: Cigarettes     Last attempt to quit: 2017     Years since quittin.6   • Smokeless tobacco: Never Used   • Tobacco comment: working on quitting   Substance Use Topics   • Alcohol use: No     Alcohol/week: 0.0 standard drinks     Frequency: Never     Drinks per session: 1 or 2     Binge frequency: Never   • Drug use: No       REVIEW OF SYSTEMS:  Constitutional:  Denies fevers.  Denies chills.  Denies tiredness or malaise.   Eyes:  Denies change in visual acuity.  Denies eye pain.  Denies eye burning.  Denies eye itching.   Immunologic:  Denies hives, seasonal allergies.   HENT:  Denies sinus problems.  Denies ear infections.  Denies nasal congestion. Denies nose bleeding.  Denies gingival bleeding.  Denies sore throat.   Respiratory:  Denies cough, shortness of breath.  Denies history of problems with intubation or anesthesia.  Cardiovascular:  Denies chest pain, edema.   Gastrointestinal:  Denies nausea, vomiting,  diarrhea. Positive for abdomen pain, Rectal bleeding   Genitourinary:  Denies urine retention, painful urination, urinary frequency, blood in urine or nocturia.   Musculoskeletal:  Denies back pain, neck pain, joint pain or leg swelling.   Integument:  Denies rash, itching.   Neurologic:  Denies  headache, focal weakness or sensory changes.   Endocrine:  Denies polyuria, polydipsia or temperature intolerance.   Lymphatic:  Denies swollen glands, weight loss.  All other systems reviewed and negative.    PHYSICAL EXAM    VITAL SIGNS:    Vitals:    12/16/19 1432   BP: 110/72   Pulse: 72   Temp: 98 °F (36.7 °C)   SpO2: 100%   Weight: 66.3 kg   Height: 5' 5\" (1.651 m)     Constitutional:  A+O (Alert and oriented) x3.  In NAD (no acute distress).  HENT:  Normocephalic.  Atraumatic.  Bilateral external ears normal.  Oropharynx moist.  No oral exudates.  No tonsillar or uvular edema.  Nose normal.   Neck:  Normal range of motion.  No tenderness.  Supple.  No stridor.    Eyes:  PERRL (Pupils equal, round, reactive to light), EOMI (extraocular movements intact).  Conjunctivae normal.  No discharge.    Cardiovascular:  Normal rate.  Normal rhythm.  No murmurs, gallops, or rubs.    Respiratory:  No respiratory distress.  Normal breath sounds.  No rales.  No wheezing.    Gastrointestinal:  Bowel sounds normal.  Soft.  No tenderness.  No masses.  No pulsatile masses.    Integument:  Warm.  Dry.  No erythema.  No rash.    Musculoskeletal:  Intact distal pulses.  No edema.  No tenderness.  No cyanosis.  No clubbing.  Good range of motion in all major joints.  No tenderness to palpation or major deformities noted.  Back - No tenderness.    Neurologic:  Alert and oriented x3.  Normal motor function.  Normal sensory function. No focal deficits noted.          Assessment    40 year old female with planned surgery as above.              plan      1. Attention deficit disorder.   Continue the Ritalin 20 mg TID  2. Anxiety/Depression.   Continue the Wellbutrin 300 mg daily  including Abilify 10 mg at night   3. IBS.  Scheduled for colonoscopy.  4. Patient is at low risk going for low risk procedure therefore she is medically cleared to go ahead with the procedure        This is Devika Tate MA acting as a scribe for Dr. Grant  Speedy    The documentation recorded by the scribe accurately and completely reflects the service(s) I personally performed and the decisions made by me.       CC:  Dr. Gerard Blanca     room air

## 2025-05-31 NOTE — ED PROVIDER NOTE - WR ORDER DATE AND TIME 1
63 yr old M with no PMHX was brought to ED by EMS on 4/11/22 s/p fall for LE weakness. CT head showed no acute infarct or hemorrhage. CTA head and neck showed no LVO or stenosis or aneurysms. CT perfusion showed no core infarct. IV TPA was not given because LKN was last night, NIHSS 4-5. Pt was given 2 baby ASA. He was found to have new onset Afib in ED. MRI Brain later revealed Acute right CVA, with hemorrhagic conversion    #Acute stroke with hemorrhagic conversion, stable on repeat CT head   #mass effect from intracerebral hemorrhage   #New-onset A-fib      Plan:  - No acute neurosurgical intervention  - OK for ASA from neurosurgical standpoint  - Needs f/u CT head in 2 weeks, sooner if any change in neurologic function  - maintain SBP <150  - Advance diet/activity as tolerated  - Continue care as per medical team    Will sign off for now, please re-consult as needed, or if there is an acute change in pts exam     Discussed with Dr. Edwards 31-May-2025 21:00

## 2025-05-31 NOTE — ED PROVIDER NOTE - CLINICAL SUMMARY MEDICAL DECISION MAKING FREE TEXT BOX
53 female with past medical history renal transplant, type 2 diabetes, hypertension, UTIs presenting with increased somnolence, generalized weakness, decreased p.o. intake for 2 days, diarrhea 3 days ago, as well as abdominal discomfort.  Daughter at bedside.  3 days ago patient had diarrhea, which her daughter also had.  Yesterday patient started feeling very tired.  When her daughter came and checked on her today she was laying in bed, felt warm, difficult to arouse..  The daughter also noticed some vomit on the carpet.  Patient endorsing some abdominal discomfort.  No chest pain, shortness of breath, urinary symptoms.  Rectal temp 102.8 °F.  On exam: Patient somnolent but answering questions.  Dry mucus membranes. Heart regular rate.  Lungs clear to auscultation bilaterally.  Abdomen soft with mild generalized tenderness to palpation.  No lower extremity edema.  Differential diagnosis includes but not limited to: UTI, viral illness, pneumonia, will evaluate for intracranial hemorrhage.  Plan: Sepsis workup including chest x-ray, urinalysis, CT head, CT abdomen pelvis, IV fluids, Tylenol, empiric antibiotics.  Will reassess.  To be admitted. 53 female with past medical history renal transplant on tacro, prednisone, and mycophenolate, type 2 diabetes, hypertension, UTIs presenting with increased somnolence, generalized weakness, decreased p.o. intake for 2 days, diarrhea 3 days ago, as well as abdominal discomfort.  Daughter at bedside.  3 days ago patient had diarrhea, which her daughter also had.  Yesterday patient started feeling very tired.  When her daughter came and checked on her today she was laying in bed, felt warm, difficult to arouse..  The daughter also noticed some vomit on the carpet.  Patient endorsing some abdominal discomfort.  No chest pain, shortness of breath, urinary symptoms.  Rectal temp 102.8 °F.  On exam: Patient somnolent but answering questions.  Dry mucus membranes. Heart regular rate.  Lungs clear to auscultation bilaterally.  Abdomen soft with mild generalized tenderness to palpation.  No lower extremity edema.  Differential diagnosis includes but not limited to: UTI, viral illness, pneumonia, will evaluate for intracranial hemorrhage.  Plan: Sepsis workup including chest x-ray, urinalysis, CT head, CT abdomen pelvis, IV fluids, Tylenol, empiric antibiotics.  Will reassess.  To be admitted. 53 female with past medical history renal transplant in 2011 on tacro, prednisone, and mycophenolate, type 2 diabetes, hypertension, UTIs presenting with increased somnolence, generalized weakness, decreased p.o. intake for 2 days, diarrhea 3 days ago, as well as abdominal discomfort.  Daughter at bedside.  3 days ago patient had diarrhea, which her daughter also had.  Yesterday patient started feeling very tired.  When her daughter came and checked on her today she was laying in bed, felt warm, difficult to arouse..  The daughter also noticed some vomit on the carpet.  Patient endorsing some abdominal discomfort.  No chest pain, shortness of breath, urinary symptoms.  Rectal temp 102.8 °F.  On exam: Patient somnolent but answering questions.  Dry mucus membranes. Heart regular rate.  Lungs clear to auscultation bilaterally.  Abdomen soft with mild generalized tenderness to palpation.  No lower extremity edema.  Differential diagnosis includes but not limited to: UTI, viral illness, pneumonia, will evaluate for intracranial hemorrhage.  Plan: Sepsis workup including chest x-ray, urinalysis, CT head, CT abdomen pelvis, IV fluids, Tylenol, empiric antibiotics.  Will reassess.  To be admitted.

## 2025-05-31 NOTE — ED PROVIDER NOTE - ATTENDING CONTRIBUTION TO CARE
Attending Statement: I have personally seen and examined this patient. I have fully participated in the care of this patient. I have reviewed all pertinent clinical information, including history physical exam, plan and the Resident's note and agree except as noted  84-year-old female history of hypertension, high cholesterol, diabetes type 2, history of renal transplant in 2011 on CellCept, tacrolimus steroids presents from home with daughter at bedside for lethargy generalized weakness, poor p.o. intake for the last 2 days.  Daughter states that over the last 2 days she has been very tired, sleeping more than usual.  Stop eating and drinking.  Today when they came to see her she was in bed very lethargic and had a subjective fever.  Daughter noticed that there was of bile vomit on the floor and tissues everywhere.  But no blood.  No history of trauma.  Daughter states that this has happened in the past normal when she has UTI and becomes septic.  Patient endorsing discomfort of the abdomen.  But never complains of dysuria or urgency.  She has had some loose BM the last couple days nonbloody diarrhea.  Denies any coughing and no sick contacts.    Vital signs appreciated patient noted to be slightly tachycardic heart rate 101 oral temp 99.4 feels warm to touch blood pressure 136/74 pulse ox 97% room air  Frail elderly female laying in bed alert and oriented, able to follow simple commands and answer short questions.  No facial or head trauma.  Able to sit up herself and grab onto the railings.  No ecchymosis or bruising of her chest back or abdomen.  Some coarse breath sounds but no work of breathing.  Abdomen was soft, nondistended mild tenderness in the lower abdomen.  Not tender on the right lower over transplanted kidney.  Normal  bilaterally normal strength of arms and legs.  No pedal edema no bruising or ecchymosis of lower extremities.  No facial asymmetry.  Dry mucous membranes.      Plan sepsis workup, tacro level, urine sample, flu COVID, chest x-ray, CT head, CT abdomen pelvis, IV fluids, Tylenol, antibiotics and admission.

## 2025-05-31 NOTE — ED ADULT NURSE NOTE - CHIEF COMPLAINT QUOTE
23
c/o generalized weakness, lower abdominal pain, N/V/D x 2 days. denies urinary symptoms. 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

## 2025-05-31 NOTE — ED PROVIDER NOTE - PROGRESS NOTE DETAILS
Has a white count of 21.7.  CMP within normal creatinine 0.76.  Lactate 1.6 sparse troponin until will repeat procalcitonin 0.15.  Urine is positive for small leukoesterase positive nitrites and white blood cells 33.  Patient was given antibiotics.  Pending imaging and admission. Flu COVID-negative.  Patient's pending imaging to be read and admission.  Will update patient and family at bedside. Patient is ANO x 3 more alert.  Patient and daughter updated on results and plan for admission.  Daughter Cristal at bedside her phone number is [phone] Joana Rivera M.D. (Resident Physician): CT A/P shows wedge shaped area in the renal transplant which could be pyelonephritis vs renal infarct. Discussed with nephro and they recommend to continue the ceftriaxone and medicine admission. They will follow.

## 2025-06-01 DIAGNOSIS — D84.9 IMMUNODEFICIENCY, UNSPECIFIED: ICD-10-CM

## 2025-06-01 DIAGNOSIS — E11.9 TYPE 2 DIABETES MELLITUS WITHOUT COMPLICATIONS: ICD-10-CM

## 2025-06-01 DIAGNOSIS — N39.0 URINARY TRACT INFECTION, SITE NOT SPECIFIED: ICD-10-CM

## 2025-06-01 DIAGNOSIS — Z29.9 ENCOUNTER FOR PROPHYLACTIC MEASURES, UNSPECIFIED: ICD-10-CM

## 2025-06-01 DIAGNOSIS — Z94.0 KIDNEY TRANSPLANT STATUS: ICD-10-CM

## 2025-06-01 DIAGNOSIS — E03.9 HYPOTHYROIDISM, UNSPECIFIED: ICD-10-CM

## 2025-06-01 DIAGNOSIS — A41.9 SEPSIS, UNSPECIFIED ORGANISM: ICD-10-CM

## 2025-06-01 DIAGNOSIS — I10 ESSENTIAL (PRIMARY) HYPERTENSION: ICD-10-CM

## 2025-06-01 LAB
ADD ON TEST-SPECIMEN IN LAB: SIGNIFICANT CHANGE UP
ALBUMIN SERPL ELPH-MCNC: 3.9 G/DL — SIGNIFICANT CHANGE UP (ref 3.3–5)
ALP SERPL-CCNC: 77 U/L — SIGNIFICANT CHANGE UP (ref 40–120)
ALT FLD-CCNC: 11 U/L — SIGNIFICANT CHANGE UP (ref 4–33)
ANION GAP SERPL CALC-SCNC: 16 MMOL/L — HIGH (ref 7–14)
AST SERPL-CCNC: 19 U/L — SIGNIFICANT CHANGE UP (ref 4–32)
BILIRUB SERPL-MCNC: 1.1 MG/DL — SIGNIFICANT CHANGE UP (ref 0.2–1.2)
BUN SERPL-MCNC: 14 MG/DL — SIGNIFICANT CHANGE UP (ref 7–23)
CALCIUM SERPL-MCNC: 9.3 MG/DL — SIGNIFICANT CHANGE UP (ref 8.4–10.5)
CHLORIDE SERPL-SCNC: 97 MMOL/L — LOW (ref 98–107)
CO2 SERPL-SCNC: 19 MMOL/L — LOW (ref 22–31)
CREAT SERPL-MCNC: 0.81 MG/DL — SIGNIFICANT CHANGE UP (ref 0.5–1.3)
EGFR: 71 ML/MIN/1.73M2 — SIGNIFICANT CHANGE UP
EGFR: 71 ML/MIN/1.73M2 — SIGNIFICANT CHANGE UP
GLUCOSE SERPL-MCNC: 134 MG/DL — HIGH (ref 70–99)
HCT VFR BLD CALC: 41.1 % — SIGNIFICANT CHANGE UP (ref 34.5–45)
HGB BLD-MCNC: 13.3 G/DL — SIGNIFICANT CHANGE UP (ref 11.5–15.5)
MAGNESIUM SERPL-MCNC: 1.4 MG/DL — LOW (ref 1.6–2.6)
MCHC RBC-ENTMCNC: 28.5 PG — SIGNIFICANT CHANGE UP (ref 27–34)
MCHC RBC-ENTMCNC: 32.4 G/DL — SIGNIFICANT CHANGE UP (ref 32–36)
MCV RBC AUTO: 88.2 FL — SIGNIFICANT CHANGE UP (ref 80–100)
MRSA PCR RESULT.: SIGNIFICANT CHANGE UP
NRBC # BLD AUTO: 0 K/UL — SIGNIFICANT CHANGE UP (ref 0–0)
NRBC # FLD: 0 K/UL — SIGNIFICANT CHANGE UP (ref 0–0)
NRBC BLD AUTO-RTO: 0 /100 WBCS — SIGNIFICANT CHANGE UP (ref 0–0)
PHOSPHATE SERPL-MCNC: 2.6 MG/DL — SIGNIFICANT CHANGE UP (ref 2.5–4.5)
PLATELET # BLD AUTO: 243 K/UL — SIGNIFICANT CHANGE UP (ref 150–400)
POTASSIUM SERPL-MCNC: 4.4 MMOL/L — SIGNIFICANT CHANGE UP (ref 3.5–5.3)
POTASSIUM SERPL-SCNC: 4.4 MMOL/L — SIGNIFICANT CHANGE UP (ref 3.5–5.3)
PROT SERPL-MCNC: 7 G/DL — SIGNIFICANT CHANGE UP (ref 6–8.3)
RBC # BLD: 4.66 M/UL — SIGNIFICANT CHANGE UP (ref 3.8–5.2)
RBC # FLD: 13.6 % — SIGNIFICANT CHANGE UP (ref 10.3–14.5)
S AUREUS DNA NOSE QL NAA+PROBE: SIGNIFICANT CHANGE UP
SODIUM SERPL-SCNC: 132 MMOL/L — LOW (ref 135–145)
T4 FREE SERPL-MCNC: 1.7 NG/DL — SIGNIFICANT CHANGE UP (ref 0.9–1.8)
TACROLIMUS SERPL-MCNC: 3.8 NG/ML — SIGNIFICANT CHANGE UP
TROPONIN T, HIGH SENSITIVITY RESULT: 13 NG/L — SIGNIFICANT CHANGE UP
TSH SERPL-MCNC: <0.1 UIU/ML — LOW (ref 0.27–4.2)
WBC # BLD: 16.59 K/UL — HIGH (ref 3.8–10.5)
WBC # FLD AUTO: 16.59 K/UL — HIGH (ref 3.8–10.5)

## 2025-06-01 PROCEDURE — 99223 1ST HOSP IP/OBS HIGH 75: CPT | Mod: GC

## 2025-06-01 PROCEDURE — 93010 ELECTROCARDIOGRAM REPORT: CPT

## 2025-06-01 PROCEDURE — 74177 CT ABD & PELVIS W/CONTRAST: CPT | Mod: 26

## 2025-06-01 PROCEDURE — 70450 CT HEAD/BRAIN W/O DYE: CPT | Mod: 26

## 2025-06-01 PROCEDURE — 99222 1ST HOSP IP/OBS MODERATE 55: CPT | Mod: GC

## 2025-06-01 PROCEDURE — G0545: CPT

## 2025-06-01 RX ORDER — TACROLIMUS 0.5 MG/1
1.5 CAPSULE ORAL
Refills: 0 | Status: DISCONTINUED | OUTPATIENT
Start: 2025-06-01 | End: 2025-06-01

## 2025-06-01 RX ORDER — INSULIN LISPRO 100 U/ML
INJECTION, SOLUTION INTRAVENOUS; SUBCUTANEOUS AT BEDTIME
Refills: 0 | Status: DISCONTINUED | OUTPATIENT
Start: 2025-06-01 | End: 2025-06-04

## 2025-06-01 RX ORDER — B1/B2/B3/B5/B6/B12/VIT C/FOLIC 500-0.5 MG
1 TABLET ORAL DAILY
Refills: 0 | Status: DISCONTINUED | OUTPATIENT
Start: 2025-06-01 | End: 2025-06-04

## 2025-06-01 RX ORDER — TACROLIMUS 0.5 MG/1
1.5 CAPSULE ORAL
Refills: 0 | Status: COMPLETED | OUTPATIENT
Start: 2025-06-01 | End: 2025-06-01

## 2025-06-01 RX ORDER — ONDANSETRON HCL/PF 4 MG/2 ML
4 VIAL (ML) INJECTION ONCE
Refills: 0 | Status: COMPLETED | OUTPATIENT
Start: 2025-06-01 | End: 2025-06-01

## 2025-06-01 RX ORDER — MAGNESIUM SULFATE 500 MG/ML
2 SYRINGE (ML) INJECTION ONCE
Refills: 0 | Status: COMPLETED | OUTPATIENT
Start: 2025-06-01 | End: 2025-06-01

## 2025-06-01 RX ORDER — TACROLIMUS 0.5 MG/1
1.5 CAPSULE ORAL
Refills: 0 | Status: DISCONTINUED | OUTPATIENT
Start: 2025-06-02 | End: 2025-06-03

## 2025-06-01 RX ORDER — PIPERACILLIN-TAZO-DEXTROSE,ISO 3.375G/5
3.38 IV SOLUTION, PIGGYBACK PREMIX FROZEN(ML) INTRAVENOUS EVERY 8 HOURS
Refills: 0 | Status: DISCONTINUED | OUTPATIENT
Start: 2025-06-01 | End: 2025-06-04

## 2025-06-01 RX ORDER — INSULIN LISPRO 100 U/ML
INJECTION, SOLUTION INTRAVENOUS; SUBCUTANEOUS
Refills: 0 | Status: DISCONTINUED | OUTPATIENT
Start: 2025-06-01 | End: 2025-06-04

## 2025-06-01 RX ORDER — ENOXAPARIN SODIUM 100 MG/ML
40 INJECTION SUBCUTANEOUS EVERY 24 HOURS
Refills: 0 | Status: DISCONTINUED | OUTPATIENT
Start: 2025-06-01 | End: 2025-06-04

## 2025-06-01 RX ORDER — ACETAMINOPHEN 500 MG/5ML
750 LIQUID (ML) ORAL ONCE
Refills: 0 | Status: COMPLETED | OUTPATIENT
Start: 2025-06-01 | End: 2025-06-01

## 2025-06-01 RX ORDER — LEVOTHYROXINE SODIUM 300 MCG
75 TABLET ORAL DAILY
Refills: 0 | Status: DISCONTINUED | OUTPATIENT
Start: 2025-06-01 | End: 2025-06-04

## 2025-06-01 RX ORDER — HYDROCORTISONE 20 MG
50 TABLET ORAL EVERY 6 HOURS
Refills: 0 | Status: DISCONTINUED | OUTPATIENT
Start: 2025-06-01 | End: 2025-06-01

## 2025-06-01 RX ORDER — PREDNISONE 20 MG/1
5 TABLET ORAL DAILY
Refills: 0 | Status: DISCONTINUED | OUTPATIENT
Start: 2025-06-01 | End: 2025-06-04

## 2025-06-01 RX ORDER — SODIUM CHLORIDE 9 G/1000ML
500 INJECTION, SOLUTION INTRAVENOUS ONCE
Refills: 0 | Status: COMPLETED | OUTPATIENT
Start: 2025-06-01 | End: 2025-06-01

## 2025-06-01 RX ORDER — ATORVASTATIN CALCIUM 80 MG/1
10 TABLET, FILM COATED ORAL AT BEDTIME
Refills: 0 | Status: DISCONTINUED | OUTPATIENT
Start: 2025-06-01 | End: 2025-06-04

## 2025-06-01 RX ORDER — SODIUM CHLORIDE 9 G/1000ML
1000 INJECTION, SOLUTION INTRAVENOUS
Refills: 0 | Status: DISCONTINUED | OUTPATIENT
Start: 2025-06-01 | End: 2025-06-02

## 2025-06-01 RX ORDER — ACETAMINOPHEN 500 MG/5ML
650 LIQUID (ML) ORAL EVERY 6 HOURS
Refills: 0 | Status: DISCONTINUED | OUTPATIENT
Start: 2025-06-01 | End: 2025-06-04

## 2025-06-01 RX ADMIN — SODIUM CHLORIDE 75 MILLILITER(S): 9 INJECTION, SOLUTION INTRAVENOUS at 13:00

## 2025-06-01 RX ADMIN — PREDNISONE 5 MILLIGRAM(S): 20 TABLET ORAL at 12:56

## 2025-06-01 RX ADMIN — Medication 25 GRAM(S): at 15:24

## 2025-06-01 RX ADMIN — Medication 300 MILLIGRAM(S): at 04:59

## 2025-06-01 RX ADMIN — Medication 650 MILLIGRAM(S): at 14:07

## 2025-06-01 RX ADMIN — SODIUM CHLORIDE 500 MILLILITER(S): 9 INJECTION, SOLUTION INTRAVENOUS at 06:58

## 2025-06-01 RX ADMIN — Medication 650 MILLIGRAM(S): at 14:51

## 2025-06-01 RX ADMIN — ENOXAPARIN SODIUM 40 MILLIGRAM(S): 100 INJECTION SUBCUTANEOUS at 12:52

## 2025-06-01 RX ADMIN — Medication 25 GRAM(S): at 13:44

## 2025-06-01 RX ADMIN — Medication 25 GRAM(S): at 08:10

## 2025-06-01 RX ADMIN — ATORVASTATIN CALCIUM 10 MILLIGRAM(S): 80 TABLET, FILM COATED ORAL at 22:07

## 2025-06-01 RX ADMIN — INSULIN LISPRO 2: 100 INJECTION, SOLUTION INTRAVENOUS; SUBCUTANEOUS at 17:35

## 2025-06-01 RX ADMIN — TACROLIMUS 1.5 MILLIGRAM(S): 0.5 CAPSULE ORAL at 22:07

## 2025-06-01 RX ADMIN — Medication 25 GRAM(S): at 22:07

## 2025-06-01 RX ADMIN — TACROLIMUS 1.5 MILLIGRAM(S): 0.5 CAPSULE ORAL at 12:55

## 2025-06-01 RX ADMIN — Medication 4 MILLIGRAM(S): at 04:59

## 2025-06-01 RX ADMIN — Medication 1 TABLET(S): at 12:56

## 2025-06-01 NOTE — H&P ADULT - NSHPLABSRESULTS_GEN_ALL_CORE
LABS:                          14.2   21.77 )-----------( 292      ( 31 May 2025 21:52 )             43.5         136  |  98  |  15  ----------------------------<  157[H]  4.1   |  20[L]  |  0.76    Ca    9.2      31 May 2025 21:52    TPro  7.6  /  Alb  4.1  /  TBili  1.0  /  DBili  x   /  AST  15  /  ALT  10  /  AlkPhos  79      LIVER FUNCTIONS - ( 31 May 2025 21:52 )  Alb: 4.1 g/dL / Pro: 7.6 g/dL / ALK PHOS: 79 U/L / ALT: 10 U/L / AST: 15 U/L / GGT: x           PT/INR - ( 31 May 2025 21:52 )   PT: 12.9 sec;   INR: 1.08 ratio         PTT - ( 31 May 2025 21:52 )  PTT:32.2 sec          Urinalysis Basic - ( 31 May 2025 21:59 )    Color: Yellow / Appearance: Cloudy / S.019 / pH: x  Gluc: x / Ketone: x  / Bili: Negative / Urobili: 1.0 mg/dL   Blood: x / Protein: 30 mg/dL / Nitrite: Positive   Leuk Esterase: Small / RBC: 2 /HPF / WBC 33 /HPF   Sq Epi: x / Non Sq Epi: 1 /HPF / Bacteria: Many /HPF          Urinalysis with Rflx Culture (collected 31 May 2025 21:59)    CT A/P: < from: CT Abdomen and Pelvis w/ IV Cont (25 @ 01:31) >    LOWER CHEST: Bibasilar subsegmental atelectasis.    LIVER: 1.8 cm left hepatic lobe cyst. Additional subcentimeter   hypodensities too small to characterize.  BILE DUCTS: Within normal limits.  GALLBLADDER: Within normal limits.  SPLEEN: Within normal limits.  PANCREAS: Within normallimits.  ADRENALS: Within normal limits.  KIDNEYS/URETERS: Atrophic native kidneys. Right lower quadrant renal   transplant with wedge shaped area of decreased enhancement in the lower   pole and mild surrounding perinephric fat stranding. 1.5 cm cyst within   the interpole region of the transplant kidney and additional   subcentimeter hypodense lesions too small for accurate characterization.   No hydronephrosis.    BLADDER: Within normal limits.  REPRODUCTIVE ORGANS: Redemonstrated right adnexal cyst measuring 8.3 x   7.0 cm. Uterus and left adnexa are within normal limits.    BOWEL: Small hiatal hernia. No bowel obstruction or inflammation. Colonic   diverticulosis without diverticulitis. Appendix is normal.  PERITONEUM/RETROPERITONEUM: Within normal limits.  VESSELS: Atherosclerotic changes. Transplant renal vasculature appears   patent.  LYMPH NODES: No lymphadenopathy.  ABDOMINAL WALL: Within normal limits.  BONES: Degenerative changes of the spine.    IMPRESSION:  1. No bowel obstruction or inflammation.  2. Wedge shaped area of decreased enhancement in the right lower quadrant   renal transplant which could be due to a pyelonephritis versus renal   infarct.      < end of copied text >

## 2025-06-01 NOTE — CONSULT NOTE ADULT - PROBLEM SELECTOR RECOMMENDATION 9
Pt. with kidney transplantation in 2012 ([facility]) currently admitted for abdominal discomfort and UTI. Scr is 0.7 on admission (6/1/25).  Monitor labs and urine output. Avoid any potential nephrotoxins. Dose medications as per eGFR.

## 2025-06-01 NOTE — CONSULT NOTE ADULT - PROBLEM SELECTOR RECOMMENDATION 2
Currently on immunosuppression (oral tacrolimus 1.5 bid, MMF, prednisone 5 qd) for kidney transplant. Recommend to hold MMF and continuing Pred and Tac. Check serum tacrolimus level (trough) 30 mins prior to am dose.

## 2025-06-01 NOTE — H&P ADULT - PROBLEM SELECTOR PLAN 5
Hold all antihypertensives here in the setting of sepsis    -Home meds: Metoprolol succinate 50mg daily, Enalapril 10mg daily, Norvasc 2.5mg daily

## 2025-06-01 NOTE — CONSULT NOTE ADULT - SUBJECTIVE AND OBJECTIVE BOX
Patient is a 84y old  Female who presents with a chief complaint of     HPI:  53 female with past medical history DM, HTN, hypothyroidism, previous admission 3/2025 with colitis and indeterminant norovirus and renal transplant in 2011 on tacro, prednisone, and mycophenolate, who presented to the ED with increased somnolence, generalized weakness, decreased p.o. intake for 2 days, diarrhea 3 days ago, as well as abdominal discomfort and diarrhea 3 days ago patient had diarrhea, which her daughter also had.  Yesterday patient started feeling very tired.  When her daughter came and checked on her today she was laying in bed, felt warm, difficult to arouse, some vomit on the carpet, endorsing some abdominal discomfort and had rectal temp 102.8. Upon work up in the ED patient afebrile but tachycardic. Labs showed WBC 21.77, procal 0.15, and UA with positive nitrite, small leukocyte esterase, 33 WBC and many bacteria. Patient was imaged with CXR showing no focal consolidation, CT head without acute findings and CT abd/pelv with IV contrast showing 1.8cm L hepatic lobe cyst, RLQ transplant kidney with wedge shaped area of decreased enhancement in the lower pole and mild surrounding perinephric fat stranding along with additional subcentimeter hypodense lesions too small for accurate characterization, adnexal cyst measuring 8.3x7.0cm, and colonic diverticulosis without diverticulitis. Patient admitted and now developing borderline low BP 91/47.      REVIEW OF SYSTEMS  pending full examination    prior hospital charts reviewed [V]  primary team notes reviewed [V]  other consultant notes reviewed [V]    PAST MEDICAL & SURGICAL HISTORY:  Renal transplant recipient      Type 2 diabetes mellitus      Hypertension      Hypothyroidism      No significant past surgical history          SOCIAL HISTORY:  Denied smoking/vaping/alcohol/recreational drug use    FAMILY HISTORY:  No pertinent family history in first degree relatives        Allergies  No Known Allergies        ANTIMICROBIALS:  piperacillin/tazobactam IVPB.. 3.375 every 8 hours      ANTIMICROBIALS (past 90 days):  MEDICATIONS  (STANDING):  piperacillin/tazobactam IVPB..   25 mL/Hr IV Intermittent (06-01-25 @ 08:10)    piperacillin/tazobactam IVPB...   200 mL/Hr IV Intermittent (05-31-25 @ 22:16)    vancomycin  IVPB.   250 mL/Hr IV Intermittent (05-31-25 @ 23:41)        OTHER MEDS:   MEDICATIONS  (STANDING):      VITALS:  Vital Signs Last 24 Hrs  T(F): 98.8 (06-01-25 @ 08:24), Max: 102.8 (05-31-25 @ 21:43)    Vital Signs Last 24 Hrs  HR: 74 (06-01-25 @ 08:24) (72 - 105)  BP: 97/41 (06-01-25 @ 08:24) (91/54 - 136/74)  RR: 17 (06-01-25 @ 08:24)  SpO2: 96% (06-01-25 @ 08:24) (95% - 100%)  Wt(kg): --    EXAM:  pending full examination      Labs:                        14.2   21.77 )-----------( 292      ( 31 May 2025 21:52 )             43.5     05-31    136  |  98  |  15  ----------------------------<  157[H]  4.1   |  20[L]  |  0.76    Ca    9.2      31 May 2025 21:52    TPro  7.6  /  Alb  4.1  /  TBili  1.0  /  DBili  x   /  AST  15  /  ALT  10  /  AlkPhos  79  05-31      WBC Trend:  WBC Count: 21.77 (05-31-25 @ 21:52)          Creatine Trend:  Creatinine: 0.76 (05-31)      Liver Biochemical Testing Trend:  Alanine Aminotransferase (ALT/SGPT): 10 (05-31)  Alanine Aminotransferase (ALT/SGPT): 12 (03-31)  Alanine Aminotransferase (ALT/SGPT): 15 (03-30)  Alanine Aminotransferase (ALT/SGPT): 12 (03-29)  Alanine Aminotransferase (ALT/SGPT): 14 (03-27)  Aspartate Aminotransferase (AST/SGOT): 15 (05-31-25 @ 21:52)  Aspartate Aminotransferase (AST/SGOT): 24 (03-31-25 @ 07:00)  Aspartate Aminotransferase (AST/SGOT): 16 (03-30-25 @ 06:00)  Aspartate Aminotransferase (AST/SGOT): 18 (03-29-25 @ 05:50)  Aspartate Aminotransferase (AST/SGOT): 25 (03-27-25 @ 21:30)  Bilirubin Total: 1.0 (05-31)  Bilirubin Total: 0.4 (03-31)  Bilirubin Total: 0.5 (03-30)  Bilirubin Total: 0.5 (03-29)  Bilirubin Total: 0.8 (03-27)    MICROBIOLOGY:  MRSA PCR Result.: NotDetec (03-28-25 @ 15:20)    Urinalysis with Rflx Culture (collected 31 May 2025 21:59)    Culture - Urine (collected 28 Mar 2025 15:02)  Source: Clean Catch Clean Catch (Midstream)  Final Report:    <10,000 CFU/mL Normal Urogenital Kelly    Urinalysis with Rflx Culture (collected 27 Mar 2025 22:50)    Culture - Blood (collected 27 Mar 2025 21:30)  Source: Blood Blood-Peripheral  Final Report:    No growth at 5 days    Culture - Blood (collected 27 Mar 2025 21:02)  Source: Blood Blood-Venous  Final Report:    No growth at 5 days    Procalcitonin: 0.15 (05-31)    Troponin T, High Sensitivity Result: 13 (05-31)  Troponin T, High Sensitivity Result: 12 (05-31)    Lactate, Blood: 1.6 (05-31 @ 21:52)  Blood Gas Venous - Lactate: 1.8 (05-31 @ 21:52)    A1C with Estimated Average Glucose Result: 7.2 % (03-28-25 @ 07:35)    RADIOLOGY:  < from: CT Abdomen and Pelvis w/ IV Cont (06.01.25 @ 01:31) >  LIVER: 1.8 cm left hepatic lobe cyst. Additional subcentimeter   hypodensities too small to characterize.  BILE DUCTS: Within normal limits.  GALLBLADDER: Within normal limits.  SPLEEN: Within normal limits.  PANCREAS: Within normallimits.  ADRENALS: Within normal limits.  KIDNEYS/URETERS: Atrophic native kidneys. Right lower quadrant renal   transplant with wedge shaped area of decreased enhancement in the lower   pole and mild surrounding perinephric fat stranding. 1.5 cm cyst within   the interpole region of the transplant kidney and additional   subcentimeter hypodense lesions too small for accurate characterization.   No hydronephrosis.    BLADDER: Within normal limits.  REPRODUCTIVE ORGANS: Redemonstrated right adnexal cyst measuring 8.3 x   7.0 cm. Uterus and left adnexa are within normal limits.    BOWEL: Small hiatal hernia. No bowel obstruction or inflammation. Colonic   diverticulosis without diverticulitis. Appendix is normal.  PERITONEUM/RETROPERITONEUM: Within normal limits.  VESSELS: Atherosclerotic changes. Transplant renal vasculature appears   patent.  LYMPH NODES: No lymphadenopathy.  ABDOMINAL WALL: Within normal limits.  BONES: Degenerative changes of the spine.    IMPRESSION:  1. No bowel obstruction or inflammation.  2. Wedge shaped area of decreased enhancement in the right lower quadrant   renal transplant which could be due to a pyelonephritis versus renal   infarct.    < from: CT Head No Cont (06.01.25 @ 01:30) >  IMPRESSION:  No acute intracranial hemorrhage, mass effect, or midline shift.    < from: Xray Chest 2 Views PA/Lat (05.31.25 @ 22:34) >  IMPRESSION:  No focal consolidation.       Patient is a 84y old  Female who presents with a chief complaint of     HPI:  53 female with past medical history DM, HTN, hypothyroidism, previous admission 3/2025 with colitis and indeterminant norovirus and renal transplant in 2012 on tacro, prednisone, and mycophenolate, who presented to the ED with increased somnolence, generalized weakness, decreased p.o. intake for 2 days, diarrhea 3 days ago, as well as abdominal discomfort and diarrhea 3 days ago patient had diarrhea, which her daughter also had.  Yesterday patient started feeling very tired.  When her daughter came and checked on her today she was laying in bed, felt warm, difficult to arouse, some vomit on the carpet, endorsing some abdominal discomfort and had rectal temp 102.8. Upon work up in the ED patient afebrile but tachycardic. Labs showed WBC 21.77, procal 0.15, and UA with positive nitrite, small leukocyte esterase, 33 WBC and many bacteria. Patient was imaged with CXR showing no focal consolidation, CT head without acute findings and CT abd/pelv with IV contrast showing 1.8cm L hepatic lobe cyst, RLQ transplant kidney with wedge shaped area of decreased enhancement in the lower pole and mild surrounding perinephric fat stranding along with additional subcentimeter hypodense lesions too small for accurate characterization, adnexal cyst measuring 8.3x7.0cm, and colonic diverticulosis without diverticulitis. Patient admitted and now developing borderline low BP 91/47.      REVIEW OF SYSTEMS  pending full examination    prior hospital charts reviewed [V]  primary team notes reviewed [V]  other consultant notes reviewed [V]    PAST MEDICAL & SURGICAL HISTORY:  Renal transplant recipient      Type 2 diabetes mellitus      Hypertension      Hypothyroidism      No significant past surgical history          SOCIAL HISTORY:  Denied smoking/vaping/alcohol/recreational drug use    FAMILY HISTORY:  No pertinent family history in first degree relatives        Allergies  No Known Allergies        ANTIMICROBIALS:  piperacillin/tazobactam IVPB.. 3.375 every 8 hours      ANTIMICROBIALS (past 90 days):  MEDICATIONS  (STANDING):  piperacillin/tazobactam IVPB..   25 mL/Hr IV Intermittent (06-01-25 @ 08:10)    piperacillin/tazobactam IVPB...   200 mL/Hr IV Intermittent (05-31-25 @ 22:16)    vancomycin  IVPB.   250 mL/Hr IV Intermittent (05-31-25 @ 23:41)        OTHER MEDS:   MEDICATIONS  (STANDING):      VITALS:  Vital Signs Last 24 Hrs  T(F): 98.8 (06-01-25 @ 08:24), Max: 102.8 (05-31-25 @ 21:43)    Vital Signs Last 24 Hrs  HR: 74 (06-01-25 @ 08:24) (72 - 105)  BP: 97/41 (06-01-25 @ 08:24) (91/54 - 136/74)  RR: 17 (06-01-25 @ 08:24)  SpO2: 96% (06-01-25 @ 08:24) (95% - 100%)  Wt(kg): --    EXAM:  pending full examination      Labs:                        14.2   21.77 )-----------( 292      ( 31 May 2025 21:52 )             43.5     05-31    136  |  98  |  15  ----------------------------<  157[H]  4.1   |  20[L]  |  0.76    Ca    9.2      31 May 2025 21:52    TPro  7.6  /  Alb  4.1  /  TBili  1.0  /  DBili  x   /  AST  15  /  ALT  10  /  AlkPhos  79  05-31      WBC Trend:  WBC Count: 21.77 (05-31-25 @ 21:52)          Creatine Trend:  Creatinine: 0.76 (05-31)      Liver Biochemical Testing Trend:  Alanine Aminotransferase (ALT/SGPT): 10 (05-31)  Alanine Aminotransferase (ALT/SGPT): 12 (03-31)  Alanine Aminotransferase (ALT/SGPT): 15 (03-30)  Alanine Aminotransferase (ALT/SGPT): 12 (03-29)  Alanine Aminotransferase (ALT/SGPT): 14 (03-27)  Aspartate Aminotransferase (AST/SGOT): 15 (05-31-25 @ 21:52)  Aspartate Aminotransferase (AST/SGOT): 24 (03-31-25 @ 07:00)  Aspartate Aminotransferase (AST/SGOT): 16 (03-30-25 @ 06:00)  Aspartate Aminotransferase (AST/SGOT): 18 (03-29-25 @ 05:50)  Aspartate Aminotransferase (AST/SGOT): 25 (03-27-25 @ 21:30)  Bilirubin Total: 1.0 (05-31)  Bilirubin Total: 0.4 (03-31)  Bilirubin Total: 0.5 (03-30)  Bilirubin Total: 0.5 (03-29)  Bilirubin Total: 0.8 (03-27)    MICROBIOLOGY:  MRSA PCR Result.: NotDetec (03-28-25 @ 15:20)    Urinalysis with Rflx Culture (collected 31 May 2025 21:59)    Culture - Urine (collected 28 Mar 2025 15:02)  Source: Clean Catch Clean Catch (Midstream)  Final Report:    <10,000 CFU/mL Normal Urogenital Kelly    Urinalysis with Rflx Culture (collected 27 Mar 2025 22:50)    Culture - Blood (collected 27 Mar 2025 21:30)  Source: Blood Blood-Peripheral  Final Report:    No growth at 5 days    Culture - Blood (collected 27 Mar 2025 21:02)  Source: Blood Blood-Venous  Final Report:    No growth at 5 days    Procalcitonin: 0.15 (05-31)    Troponin T, High Sensitivity Result: 13 (05-31)  Troponin T, High Sensitivity Result: 12 (05-31)    Lactate, Blood: 1.6 (05-31 @ 21:52)  Blood Gas Venous - Lactate: 1.8 (05-31 @ 21:52)    A1C with Estimated Average Glucose Result: 7.2 % (03-28-25 @ 07:35)    RADIOLOGY:  < from: CT Abdomen and Pelvis w/ IV Cont (06.01.25 @ 01:31) >  LIVER: 1.8 cm left hepatic lobe cyst. Additional subcentimeter   hypodensities too small to characterize.  BILE DUCTS: Within normal limits.  GALLBLADDER: Within normal limits.  SPLEEN: Within normal limits.  PANCREAS: Within normallimits.  ADRENALS: Within normal limits.  KIDNEYS/URETERS: Atrophic native kidneys. Right lower quadrant renal   transplant with wedge shaped area of decreased enhancement in the lower   pole and mild surrounding perinephric fat stranding. 1.5 cm cyst within   the interpole region of the transplant kidney and additional   subcentimeter hypodense lesions too small for accurate characterization.   No hydronephrosis.    BLADDER: Within normal limits.  REPRODUCTIVE ORGANS: Redemonstrated right adnexal cyst measuring 8.3 x   7.0 cm. Uterus and left adnexa are within normal limits.    BOWEL: Small hiatal hernia. No bowel obstruction or inflammation. Colonic   diverticulosis without diverticulitis. Appendix is normal.  PERITONEUM/RETROPERITONEUM: Within normal limits.  VESSELS: Atherosclerotic changes. Transplant renal vasculature appears   patent.  LYMPH NODES: No lymphadenopathy.  ABDOMINAL WALL: Within normal limits.  BONES: Degenerative changes of the spine.    IMPRESSION:  1. No bowel obstruction or inflammation.  2. Wedge shaped area of decreased enhancement in the right lower quadrant   renal transplant which could be due to a pyelonephritis versus renal   infarct.    < from: CT Head No Cont (06.01.25 @ 01:30) >  IMPRESSION:  No acute intracranial hemorrhage, mass effect, or midline shift.    < from: Xray Chest 2 Views PA/Lat (05.31.25 @ 22:34) >  IMPRESSION:  No focal consolidation.       Patient is a 84y old  Female who presents with a chief complaint of     HPI:  53 female with past medical history DM, HTN, hypothyroidism, TBI with subdural hematoma in 2023, previous admission 3/2025 with colitis and indeterminant norovirus and renal transplant in 2012 on tacro, prednisone, and mycophenolate, who presented to the ED with increased somnolence, generalized weakness, decreased p.o. intake for 2 days, diarrhea 3 days ago, as well as abdominal discomfort and diarrhea 3 days ago patient had diarrhea, which her daughter also had.  Yesterday patient started feeling very tired.  When her daughter came and checked on her today she was laying in bed, felt warm, difficult to arouse, some vomit on the carpet, endorsing some abdominal discomfort and had rectal temp 102.8. Upon work up in the ED patient afebrile but tachycardic. Labs showed WBC 21.77, procal 0.15, and UA with positive nitrite, small leukocyte esterase, 33 WBC and many bacteria. Patient was imaged with CXR showing no focal consolidation, CT head without acute findings and CT abd/pelv with IV contrast showing 1.8cm L hepatic lobe cyst, RLQ transplant kidney with wedge shaped area of decreased enhancement in the lower pole and mild surrounding perinephric fat stranding along with additional subcentimeter hypodense lesions too small for accurate characterization, adnexal cyst measuring 8.3x7.0cm, and colonic diverticulosis without diverticulitis. Patient admitted and now developing borderline low BP 91/47.  Daughters at bedside states that she developed diarrhea apprximately 1 week ago then felt better but is now more lethargic and mental status has worsened. She doesn't typically have pain or urinary symptoms when she has a UTI. Patient unable to supply hpi.    REVIEW OF SYSTEMS  unable to be obtained due to mental status     prior hospital charts reviewed [V]  primary team notes reviewed [V]  other consultant notes reviewed [V]    PAST MEDICAL & SURGICAL HISTORY:  Renal transplant recipient      Type 2 diabetes mellitus      Hypertension      Hypothyroidism      No significant past surgical history          SOCIAL HISTORY:  Denied smoking/vaping/alcohol/recreational drug use, last travel 5/1/2025 to [place], no sick conatcts, no pets, denies hiking or outdoor exposure     FAMILY HISTORY:  No pertinent family history in first degree relatives        Allergies  No Known Allergies        ANTIMICROBIALS:  piperacillin/tazobactam IVPB.. 3.375 every 8 hours      ANTIMICROBIALS (past 90 days):  MEDICATIONS  (STANDING):  piperacillin/tazobactam IVPB..   25 mL/Hr IV Intermittent (06-01-25 @ 08:10)    piperacillin/tazobactam IVPB...   200 mL/Hr IV Intermittent (05-31-25 @ 22:16)    vancomycin  IVPB.   250 mL/Hr IV Intermittent (05-31-25 @ 23:41)        OTHER MEDS:   MEDICATIONS  (STANDING):      VITALS:  Vital Signs Last 24 Hrs  T(F): 98.8 (06-01-25 @ 08:24), Max: 102.8 (05-31-25 @ 21:43)    Vital Signs Last 24 Hrs  HR: 74 (06-01-25 @ 08:24) (72 - 105)  BP: 97/41 (06-01-25 @ 08:24) (91/54 - 136/74)  RR: 17 (06-01-25 @ 08:24)  SpO2: 96% (06-01-25 @ 08:24) (95% - 100%)  Wt(kg): --    EXAM:  General: Patient appears comfortable but lethargic, with temor   HEENT: NCAT, PERRL, anicteric sclera no conjuctival hemorrhages   Neck: Supple, No lymphadenopathy  CV: +S1/S2, RRR, no M/R/G  Lungs: No respiratory distress, CTA b/l, no wheezing, rales or rhonchi, poor respiratory effort   Abd:  BS4+, Soft, slight grimacing with palpation in the RLQ though says no when asked about pain  : No suprapubic tenderness  Neuro: AAOx0-1. Tremulous  Ext: No cyanosis, no edema, ecchymosis on the palmar aspect of the b/l 3rd digits not likely an osler node, no splinter hemorrhages   Skin: No rash, no phlebitis, No erythema       Labs:                        14.2   21.77 )-----------( 292      ( 31 May 2025 21:52 )             43.5     05-31    136  |  98  |  15  ----------------------------<  157[H]  4.1   |  20[L]  |  0.76    Ca    9.2      31 May 2025 21:52    TPro  7.6  /  Alb  4.1  /  TBili  1.0  /  DBili  x   /  AST  15  /  ALT  10  /  AlkPhos  79  05-31      WBC Trend:  WBC Count: 21.77 (05-31-25 @ 21:52)          Creatine Trend:  Creatinine: 0.76 (05-31)      Liver Biochemical Testing Trend:  Alanine Aminotransferase (ALT/SGPT): 10 (05-31)  Alanine Aminotransferase (ALT/SGPT): 12 (03-31)  Alanine Aminotransferase (ALT/SGPT): 15 (03-30)  Alanine Aminotransferase (ALT/SGPT): 12 (03-29)  Alanine Aminotransferase (ALT/SGPT): 14 (03-27)  Aspartate Aminotransferase (AST/SGOT): 15 (05-31-25 @ 21:52)  Aspartate Aminotransferase (AST/SGOT): 24 (03-31-25 @ 07:00)  Aspartate Aminotransferase (AST/SGOT): 16 (03-30-25 @ 06:00)  Aspartate Aminotransferase (AST/SGOT): 18 (03-29-25 @ 05:50)  Aspartate Aminotransferase (AST/SGOT): 25 (03-27-25 @ 21:30)  Bilirubin Total: 1.0 (05-31)  Bilirubin Total: 0.4 (03-31)  Bilirubin Total: 0.5 (03-30)  Bilirubin Total: 0.5 (03-29)  Bilirubin Total: 0.8 (03-27)    MICROBIOLOGY:  MRSA PCR Result.: NotDetec (03-28-25 @ 15:20)    Urinalysis with Rflx Culture (collected 31 May 2025 21:59)    Culture - Urine (collected 28 Mar 2025 15:02)  Source: Clean Catch Clean Catch (Midstream)  Final Report:    <10,000 CFU/mL Normal Urogenital Kelly    Urinalysis with Rflx Culture (collected 27 Mar 2025 22:50)    Culture - Blood (collected 27 Mar 2025 21:30)  Source: Blood Blood-Peripheral  Final Report:    No growth at 5 days    Culture - Blood (collected 27 Mar 2025 21:02)  Source: Blood Blood-Venous  Final Report:    No growth at 5 days    Procalcitonin: 0.15 (05-31)    Troponin T, High Sensitivity Result: 13 (05-31)  Troponin T, High Sensitivity Result: 12 (05-31)    Lactate, Blood: 1.6 (05-31 @ 21:52)  Blood Gas Venous - Lactate: 1.8 (05-31 @ 21:52)    A1C with Estimated Average Glucose Result: 7.2 % (03-28-25 @ 07:35)    RADIOLOGY:  < from: CT Abdomen and Pelvis w/ IV Cont (06.01.25 @ 01:31) >  LIVER: 1.8 cm left hepatic lobe cyst. Additional subcentimeter   hypodensities too small to characterize.  BILE DUCTS: Within normal limits.  GALLBLADDER: Within normal limits.  SPLEEN: Within normal limits.  PANCREAS: Within normallimits.  ADRENALS: Within normal limits.  KIDNEYS/URETERS: Atrophic native kidneys. Right lower quadrant renal   transplant with wedge shaped area of decreased enhancement in the lower   pole and mild surrounding perinephric fat stranding. 1.5 cm cyst within   the interpole region of the transplant kidney and additional   subcentimeter hypodense lesions too small for accurate characterization.   No hydronephrosis.    BLADDER: Within normal limits.  REPRODUCTIVE ORGANS: Redemonstrated right adnexal cyst measuring 8.3 x   7.0 cm. Uterus and left adnexa are within normal limits.    BOWEL: Small hiatal hernia. No bowel obstruction or inflammation. Colonic   diverticulosis without diverticulitis. Appendix is normal.  PERITONEUM/RETROPERITONEUM: Within normal limits.  VESSELS: Atherosclerotic changes. Transplant renal vasculature appears   patent.  LYMPH NODES: No lymphadenopathy.  ABDOMINAL WALL: Within normal limits.  BONES: Degenerative changes of the spine.    IMPRESSION:  1. No bowel obstruction or inflammation.  2. Wedge shaped area of decreased enhancement in the right lower quadrant   renal transplant which could be due to a pyelonephritis versus renal   infarct.    < from: CT Head No Cont (06.01.25 @ 01:30) >  IMPRESSION:  No acute intracranial hemorrhage, mass effect, or midline shift.    < from: Xray Chest 2 Views PA/Lat (05.31.25 @ 22:34) >  IMPRESSION:  No focal consolidation.       Patient is a 84y old  Female who presents with a chief complaint of     HPI:  84 female with past medical history DM, HTN, hypothyroidism, TBI with subdural hematoma in 2023, previous admission 3/2025 with colitis and indeterminant norovirus and renal transplant in 2012 on tacro, prednisone, and mycophenolate, who presented to the ED with increased somnolence, generalized weakness, decreased p.o. intake for 2 days, diarrhea 3 days ago, as well as abdominal discomfort and diarrhea 3 days ago patient had diarrhea, which her daughter also had.  Yesterday patient started feeling very tired.  When her daughter came and checked on her today she was laying in bed, felt warm, difficult to arouse, some vomit on the carpet, endorsing some abdominal discomfort and had rectal temp 102.8. Upon work up in the ED patient afebrile but tachycardic. Labs showed WBC 21.77, procal 0.15, and UA with positive nitrite, small leukocyte esterase, 33 WBC and many bacteria. Patient was imaged with CXR showing no focal consolidation, CT head without acute findings and CT abd/pelv with IV contrast showing 1.8cm L hepatic lobe cyst, RLQ transplant kidney with wedge shaped area of decreased enhancement in the lower pole and mild surrounding perinephric fat stranding along with additional subcentimeter hypodense lesions too small for accurate characterization, adnexal cyst measuring 8.3x7.0cm, and colonic diverticulosis without diverticulitis. Patient admitted and now developing borderline low BP 91/47.  Daughters at bedside states that she developed diarrhea apprximately 1 week ago then felt better but is now more lethargic and mental status has worsened. She doesn't typically have pain or urinary symptoms when she has a UTI. Patient unable to supply hpi.    REVIEW OF SYSTEMS  unable to be obtained due to mental status     prior hospital charts reviewed [V]  primary team notes reviewed [V]  other consultant notes reviewed [V]    PAST MEDICAL & SURGICAL HISTORY:  Renal transplant recipient      Type 2 diabetes mellitus      Hypertension      Hypothyroidism      No significant past surgical history          SOCIAL HISTORY:  Denied smoking/vaping/alcohol/recreational drug use, last travel 5/1/2025 to [place], no sick conatcts, no pets, denies hiking or outdoor exposure     FAMILY HISTORY:  No pertinent family history in first degree relatives        Allergies  No Known Allergies        ANTIMICROBIALS:  piperacillin/tazobactam IVPB.. 3.375 every 8 hours      ANTIMICROBIALS (past 90 days):  MEDICATIONS  (STANDING):  piperacillin/tazobactam IVPB..   25 mL/Hr IV Intermittent (06-01-25 @ 08:10)    piperacillin/tazobactam IVPB...   200 mL/Hr IV Intermittent (05-31-25 @ 22:16)    vancomycin  IVPB.   250 mL/Hr IV Intermittent (05-31-25 @ 23:41)        OTHER MEDS:   MEDICATIONS  (STANDING):      VITALS:  Vital Signs Last 24 Hrs  T(F): 98.8 (06-01-25 @ 08:24), Max: 102.8 (05-31-25 @ 21:43)    Vital Signs Last 24 Hrs  HR: 74 (06-01-25 @ 08:24) (72 - 105)  BP: 97/41 (06-01-25 @ 08:24) (91/54 - 136/74)  RR: 17 (06-01-25 @ 08:24)  SpO2: 96% (06-01-25 @ 08:24) (95% - 100%)  Wt(kg): --    EXAM:  General:  lethargic, with temor   HEENT: NCAT, PERRL, anicteric sclera no conjuctival hemorrhages   Neck: Supple, No lymphadenopathy  CV: +S1/S2, RRR, no M/R/G  Lungs: No respiratory distress, CTA b/l, no wheezing, rales or rhonchi, poor respiratory effort   Abd:  BS4+, Soft, slight grimacing with palpation in the RLQ though says no when asked about pain  : No suprapubic tenderness  Neuro: AAOx0-1. Tremulous  Ext: No cyanosis, no edema, ecchymosis on the palmar aspect of the b/l 3rd digits not likely an osler node, no splinter hemorrhages   Skin: No rash, no phlebitis, No erythema       Labs:                        14.2   21.77 )-----------( 292      ( 31 May 2025 21:52 )             43.5     05-31    136  |  98  |  15  ----------------------------<  157[H]  4.1   |  20[L]  |  0.76    Ca    9.2      31 May 2025 21:52    TPro  7.6  /  Alb  4.1  /  TBili  1.0  /  DBili  x   /  AST  15  /  ALT  10  /  AlkPhos  79  05-31      WBC Trend:  WBC Count: 21.77 (05-31-25 @ 21:52)          Creatine Trend:  Creatinine: 0.76 (05-31)      Liver Biochemical Testing Trend:  Alanine Aminotransferase (ALT/SGPT): 10 (05-31)  Alanine Aminotransferase (ALT/SGPT): 12 (03-31)  Alanine Aminotransferase (ALT/SGPT): 15 (03-30)  Alanine Aminotransferase (ALT/SGPT): 12 (03-29)  Alanine Aminotransferase (ALT/SGPT): 14 (03-27)  Aspartate Aminotransferase (AST/SGOT): 15 (05-31-25 @ 21:52)  Aspartate Aminotransferase (AST/SGOT): 24 (03-31-25 @ 07:00)  Aspartate Aminotransferase (AST/SGOT): 16 (03-30-25 @ 06:00)  Aspartate Aminotransferase (AST/SGOT): 18 (03-29-25 @ 05:50)  Aspartate Aminotransferase (AST/SGOT): 25 (03-27-25 @ 21:30)  Bilirubin Total: 1.0 (05-31)  Bilirubin Total: 0.4 (03-31)  Bilirubin Total: 0.5 (03-30)  Bilirubin Total: 0.5 (03-29)  Bilirubin Total: 0.8 (03-27)    MICROBIOLOGY:  MRSA PCR Result.: NotDetec (03-28-25 @ 15:20)    Urinalysis with Rflx Culture (collected 31 May 2025 21:59)    Culture - Urine (collected 28 Mar 2025 15:02)  Source: Clean Catch Clean Catch (Midstream)  Final Report:    <10,000 CFU/mL Normal Urogenital Kelly    Urinalysis with Rflx Culture (collected 27 Mar 2025 22:50)    Culture - Blood (collected 27 Mar 2025 21:30)  Source: Blood Blood-Peripheral  Final Report:    No growth at 5 days    Culture - Blood (collected 27 Mar 2025 21:02)  Source: Blood Blood-Venous  Final Report:    No growth at 5 days    Procalcitonin: 0.15 (05-31)    Troponin T, High Sensitivity Result: 13 (05-31)  Troponin T, High Sensitivity Result: 12 (05-31)    Lactate, Blood: 1.6 (05-31 @ 21:52)  Blood Gas Venous - Lactate: 1.8 (05-31 @ 21:52)    A1C with Estimated Average Glucose Result: 7.2 % (03-28-25 @ 07:35)    RADIOLOGY:  < from: CT Abdomen and Pelvis w/ IV Cont (06.01.25 @ 01:31) >  LIVER: 1.8 cm left hepatic lobe cyst. Additional subcentimeter   hypodensities too small to characterize.  BILE DUCTS: Within normal limits.  GALLBLADDER: Within normal limits.  SPLEEN: Within normal limits.  PANCREAS: Within normallimits.  ADRENALS: Within normal limits.  KIDNEYS/URETERS: Atrophic native kidneys. Right lower quadrant renal   transplant with wedge shaped area of decreased enhancement in the lower   pole and mild surrounding perinephric fat stranding. 1.5 cm cyst within   the interpole region of the transplant kidney and additional   subcentimeter hypodense lesions too small for accurate characterization.   No hydronephrosis.    BLADDER: Within normal limits.  REPRODUCTIVE ORGANS: Redemonstrated right adnexal cyst measuring 8.3 x   7.0 cm. Uterus and left adnexa are within normal limits.    BOWEL: Small hiatal hernia. No bowel obstruction or inflammation. Colonic   diverticulosis without diverticulitis. Appendix is normal.  PERITONEUM/RETROPERITONEUM: Within normal limits.  VESSELS: Atherosclerotic changes. Transplant renal vasculature appears   patent.  LYMPH NODES: No lymphadenopathy.  ABDOMINAL WALL: Within normal limits.  BONES: Degenerative changes of the spine.    IMPRESSION:  1. No bowel obstruction or inflammation.  2. Wedge shaped area of decreased enhancement in the right lower quadrant   renal transplant which could be due to a pyelonephritis versus renal   infarct.    < from: CT Head No Cont (06.01.25 @ 01:30) >  IMPRESSION:  No acute intracranial hemorrhage, mass effect, or midline shift.    < from: Xray Chest 2 Views PA/Lat (05.31.25 @ 22:34) >  IMPRESSION:  No focal consolidation.

## 2025-06-01 NOTE — CONSULT NOTE ADULT - SUBJECTIVE AND OBJECTIVE BOX
[facility] DIVISION OF KIDNEY DISEASES AND HYPERTENSION -- [phone]  -- INITIAL CONSULT NOTE  --------------------------------------------------------------------------------  HPI: 84 F with hx of HTN, DM2, hypothyroidism, recurrent UTIs, ESRD s/p renal transplant (done at [facility] in 2012) presents with weakness, abdominal discomfort. Nephrology consulted for hx of renal transplant.    Spoke with pt and pt's daughter who states after she ate something last week, she had 3 days of diarrhea that has now resolved. She then had 3 days of decreased appetite with episodes of nausea. She had one episode of vomiting. She has been feeling fatigued with episodes of confusion. She is now starting to feel better. Daughter states she also feels like this when she had prior episodes of UTI. She had a positive UA. She had some chills and noted to have a fever in the ER. She states the abdominal discomfort is very mild. She denies any other sxs. She states she had a renal transplant done at [facility] and is currently on Pred, Tac and MMF. She states to be compliant with her medications.       PAST HISTORY  --------------------------------------------------------------------------------  PAST MEDICAL & SURGICAL HISTORY:  Renal transplant recipient      Type 2 diabetes mellitus      Hypertension      Hypothyroidism      No significant past surgical history        FAMILY HISTORY:  No pertinent family history in first degree relatives      PAST SOCIAL HISTORY: Non-contributory    ALLERGIES & MEDICATIONS  --------------------------------------------------------------------------------  Allergies    No Known Allergies    Intolerances      Standing Inpatient Medications  atorvastatin 10 milliGRAM(s) Oral at bedtime  chlorhexidine 2% Cloths 1 Application(s) Topical <User Schedule>  enoxaparin Injectable 40 milliGRAM(s) SubCutaneous every 24 hours  insulin lispro (ADMELOG) corrective regimen sliding scale   SubCutaneous three times a day before meals  insulin lispro (ADMELOG) corrective regimen sliding scale   SubCutaneous at bedtime  lactated ringers. 1000 milliLiter(s) IV Continuous <Continuous>  levothyroxine 75 MICROGram(s) Oral daily  multivitamin 1 Tablet(s) Oral daily  piperacillin/tazobactam IVPB.. 3.375 Gram(s) IV Intermittent every 8 hours  predniSONE   Tablet 5 milliGRAM(s) Oral daily  tacrolimus 1.5 milliGRAM(s) Oral <User Schedule>    PRN Inpatient Medications  acetaminophen     Tablet .. 650 milliGRAM(s) Oral every 6 hours PRN      REVIEW OF SYSTEMS  --------------------------------------------------------------------------------  Gen: Chills  Respiratory: No dyspnea, cough  CV: No chest pain  GI: Abdominal discomfort  : No dysuria, hematuria  MSK: No  edema  Heme: No easy bruising or bleeding    All other systems were reviewed and are negative, except as noted.    VITALS/PHYSICAL EXAM  --------------------------------------------------------------------------------  T(C): 39.4 (06-01-25 @ 13:51), Max: 39.4 (06-01-25 @ 13:51)  HR: 94 (06-01-25 @ 13:51) (70 - 105)  BP: 148/82 (06-01-25 @ 13:51) (91/54 - 148/82)  RR: 17 (06-01-25 @ 13:51) (17 - 18)  SpO2: 97% (06-01-25 @ 13:51) (95% - 100%)  Wt(kg): --  Height (cm): 162.6 (06-01-25 @ 08:24)  Weight (kg): 54.1 (06-01-25 @ 08:24)  BMI (kg/m2): 20.5 (06-01-25 @ 08:24)  BSA (m2): 1.57 (06-01-25 @ 08:24)      Physical Exam:  	Gen: Lethargic  	Pulm: CTA B/L  	CV: S1S2  	Abd: Soft, +BS   	Ext: No LE edema B/L  	Neuro: Awake  	Skin: Warm and dry    LABS/STUDIES  --------------------------------------------------------------------------------              13.3   16.59 >-----------<  243      [06-01-25 @ 12:23]              41.1     132  |  97  |  14  ----------------------------<  134      [06-01-25 @ 12:23]  4.4   |  19  |  0.81        Ca     9.3     [06-01-25 @ 12:23]      Mg     1.40     [06-01-25 @ 12:23]      Phos  2.6     [06-01-25 @ 12:23]    TPro  7.0  /  Alb  3.9  /  TBili  1.1  /  DBili  x   /  AST  19  /  ALT  11  /  AlkPhos  77  [06-01-25 @ 12:23]    PT/INR: PT 12.9 , INR 1.08       [05-31-25 @ 21:52]  PTT: 32.2       [05-31-25 @ 21:52]      Creatinine Trend:  SCr 0.81 [06-01 @ 12:23]  SCr 0.76 [05-31 @ 21:52]    Urinalysis - [06-01-25 @ 12:23]      Color  / Appearance  / SG  / pH       Gluc 134 / Ketone   / Bili  / Urobili        Blood  / Protein  / Leuk Est  / Nitrite       RBC  / WBC  / Hyaline  / Gran  / Sq Epi  / Non Sq Epi  / Bacteria       TSH <0.10      [06-01-25 @ 12:23]

## 2025-06-01 NOTE — DIETITIAN INITIAL EVALUATION ADULT - ETIOLOGY
related to physiological causes of acute illness related to physiological causes of endocrine dysfunction

## 2025-06-01 NOTE — H&P ADULT - NSHPREVIEWOFSYSTEMS_GEN_ALL_CORE
REVIEW OF SYSTEMS:  CONSTITUTIONAL: No weakness, fevers, chills, sick contacts, or unintended weight loss  EYES: No visual changes or vertigo  ENT: No throat pain, rhinorrhea, or hearing loss   NECK: No pain or stiffness  RESPIRATORY: No cough, wheezing, hemoptysis; No shortness of breath  CARDIOVASCULAR: No chest pain or palpitations  GASTROINTESTINAL: No abdominal or epigastric pain. No nausea, vomiting, or hematemesis; No diarrhea or constipation. No melena or hematochezia.  GENITOURINARY: No dysuria, frequency or hematuria  NEUROLOGICAL: No numbness or weakness  SKIN: No itching, rashes, or bruises  Psych: Good mood, no substance use REVIEW OF SYSTEMS:  CONSTITUTIONAL: As above  EYES: No visual changes or vertigo  ENT: No throat pain, rhinorrhea, or hearing loss   NECK: No pain or stiffness  RESPIRATORY: No cough, wheezing, hemoptysis; No shortness of breath  CARDIOVASCULAR: No chest pain or palpitations  GASTROINTESTINAL: No abdominal or epigastric pain. No nausea, vomiting, or hematemesis; No diarrhea or constipation. No melena or hematochezia.  GENITOURINARY: No dysuria, frequency or hematuria  NEUROLOGICAL: No numbness or weakness  SKIN: No itching, rashes, or bruises  Psych: Good mood, no substance use

## 2025-06-01 NOTE — CONSULT NOTE ADULT - ASSESSMENT
Impression/Hospital Course:  53 female with past medical history DM, HTN, hypothyroidism, previous admission 3/2025 with colitis and indeterminant norovirus and renal transplant in 2011 on tacro, prednisone, and mycophenolate, who presented to the ED with increased somnolence, generalized weakness, decreased p.o. intake for 2 days, diarrhea 3 days ago, as well as abdominal discomfort and diarrhea 3 days ago patient had diarrhea, which her daughter also had.  Yesterday patient started feeling very tired.  When her daughter came and checked on her today she was laying in bed, felt warm, difficult to arouse, some vomit on the carpet, endorsing some abdominal discomfort and had rectal temp 102.8. Upon work up in the ED patient afebrile but tachycardic. Labs showed WBC 21.77, procal 0.15, and UA with positive nitrite, small leukocyte esterase, 33 WBC and many bacteria. Patient was imaged with CXR showing no focal consolidation, CT head without acute findings and CT abd/pelv with IV contrast showing 1.8cm L hepatic lobe cyst, RLQ transplant kidney with wedge shaped area of decreased enhancement in the lower pole and mild surrounding perinephric fat stranding along with additional subcentimeter hypodense lesions too small for accurate characterization, adnexal cyst measuring 8.3x7.0cm, and colonic diverticulosis without diverticulitis. Patient admitted and now developing borderline low BP 91/47.      Antimicrobials:  vancomycin 5/31  piperacillin/tazobactam 5/31 - current    Assessment:  *RLQ transplant kidney with decreased areas of enhancement, this could be pyelonephritis but UA has only mild pyuria (patient is immunosuppressed which can cause a muted immune response) but this may also be due to infarct which can also lead to leukocytosis and pyuria, unclear at this time  *Sepsis as evidenced by leukocytosis and tachycardia  *Subjective fevers at home   *Diarrhea unclear etiology, no colitis on CT scan   *Mildly elevated procal 0.15  *DM  *Hypothyroidism    Recommendations: PLEASE DEFER ALL CHANGES IN PLAN UNTIL SIGNED BY ATTENDING. All recommendations are tentative pending Attending Attestation.  - continue piperacillin/tazobactam   - would consider work up of possible renal infarct consider presentation though there is likely also an aspect of infection  - consider TTE to rule out embolic source of infarct   - trend temperature and WBC   - follow blood and urine cultures (received by lab with results pending), adjust antimicrobial therapy based off of culture and sensitivity     Wei Harris DO, PGY-5   Infectious Disease Fellow  [employer] Teams Preferred  After 5pm/weekends call [phone]  Impression/Hospital Course:  53 female with past medical history DM, HTN, hypothyroidism, previous admission 3/2025 with colitis and indeterminant norovirus and renal transplant in 2012 on tacro, prednisone, and mycophenolate, who presented to the ED with increased somnolence, generalized weakness, decreased p.o. intake for 2 days, diarrhea 3 days ago, as well as abdominal discomfort and diarrhea 3 days ago patient had diarrhea, which her daughter also had.  Yesterday patient started feeling very tired.  When her daughter came and checked on her today she was laying in bed, felt warm, difficult to arouse, some vomit on the carpet, endorsing some abdominal discomfort and had rectal temp 102.8. Upon work up in the ED patient afebrile but tachycardic. Labs showed WBC 21.77, procal 0.15, and UA with positive nitrite, small leukocyte esterase, 33 WBC and many bacteria. Patient was imaged with CXR showing no focal consolidation, CT head without acute findings and CT abd/pelv with IV contrast showing 1.8cm L hepatic lobe cyst, RLQ transplant kidney with wedge shaped area of decreased enhancement in the lower pole and mild surrounding perinephric fat stranding along with additional subcentimeter hypodense lesions too small for accurate characterization, adnexal cyst measuring 8.3x7.0cm, and colonic diverticulosis without diverticulitis. Patient admitted and now developing borderline low BP 91/47. Patient on 6/1 fever to Tmax of 103     Antimicrobials:  vancomycin 5/31  piperacillin/tazobactam 5/31 - current    Assessment:  *RLQ transplant kidney with decreased areas of enhancement, this could be pyelonephritis, UA has mild pyuria (patient is immunosuppressed which can cause a muted immune response) but this may also be due to infarct which can also lead to leukocytosis and pyuria, unclear at this time  *Sepsis as evidenced by leukocytosis and tachycardia  *Pyrexia    *Diarrhea unclear etiology, no colitis on CT scan   *Mildly elevated procal 0.15  *DM  *Hypothyroidism    Recommendations:   - continue piperacillin/tazobactam   - would consider work up of possible renal infarct consider presentation though there is likely also an aspect of infection  - trend temperature and WBC   - follow blood and urine cultures (received by lab with results pending), adjust antimicrobial therapy based off of culture and sensitivity     Wei Harris DO, PGY-5   Infectious Disease Fellow  [employer] Teams Preferred  After 5pm/weekends call [phone]  Impression/Hospital Course:  53 female with past medical history DM, HTN, hypothyroidism, TBI with Subdural hematoma in 2023 previous admission 3/2025 with colitis and indeterminant norovirus and renal transplant in 2012 on tacro, prednisone, and mycophenolate, who presented to the ED with increased somnolence, generalized weakness, decreased p.o. intake for 2 days, diarrhea 3 days ago, as well as abdominal discomfort and diarrhea 3 days ago patient had diarrhea, which her daughter also had.  Yesterday patient started feeling very tired.  When her daughter came and checked on her today she was laying in bed, felt warm, difficult to arouse, some vomit on the carpet, endorsing some abdominal discomfort and had rectal temp 102.8. Upon work up in the ED patient afebrile but tachycardic. Labs showed WBC 21.77, procal 0.15, and UA with positive nitrite, small leukocyte esterase, 33 WBC and many bacteria. Patient was imaged with CXR showing no focal consolidation, CT head without acute findings and CT abd/pelv with IV contrast showing 1.8cm L hepatic lobe cyst, RLQ transplant kidney with wedge shaped area of decreased enhancement in the lower pole and mild surrounding perinephric fat stranding along with additional subcentimeter hypodense lesions too small for accurate characterization, adnexal cyst measuring 8.3x7.0cm, and colonic diverticulosis without diverticulitis. Patient admitted and now developing borderline low BP 91/47. Patient on 6/1 fever to Tmax of 103     Antimicrobials:  vancomycin 5/31  piperacillin/tazobactam 5/31 - current    Assessment:  *RLQ transplant kidney with decreased areas of enhancement, this could be pyelonephritis, UA has mild pyuria (patient is immunosuppressed which can cause a muted immune response) but this may also be due to infarct which can also lead to leukocytosis and pyuria, unclear at this time  *Sepsis as evidenced by leukocytosis and tachycardia  *Pyrexia    *Diarrhea unclear etiology, no colitis on CT scan   *Mildly elevated procal 0.15  *DM  *Hypothyroidism  *HX of TBI with subdural hematoma in 2023    Recommendations:   - continue piperacillin/tazobactam   - would consider work up of possible renal infarct consider presentation though there is likely also an aspect of infection  - trend temperature and WBC   - follow blood and urine cultures (received by lab with results pending), adjust antimicrobial therapy based off of culture and sensitivity     Wei Harris DO, PGY-5   Infectious Disease Fellow  [facility] Teams Preferred  After 5pm/weekends call [phone]  Impression/Hospital Course:  84 female with past medical history DM, HTN, hypothyroidism, TBI with Subdural hematoma in 2023 previous admission 3/2025 with colitis and indeterminant norovirus and renal transplant in 2012 on tacro, prednisone, and mycophenolate, who presented to the ED with increased somnolence, generalized weakness, decreased p.o. intake for 2 days, diarrhea 3 days ago, as well as abdominal discomfort and diarrhea 3 days ago patient had diarrhea, which her daughter also had.  Yesterday patient started feeling very tired.  When her daughter came and checked on her today she was laying in bed, felt warm, difficult to arouse, some vomit on the carpet, endorsing some abdominal discomfort and had rectal temp 102.8. Upon work up in the ED patient afebrile but tachycardic. Labs showed WBC 21.77, procal 0.15, and UA with positive nitrite, small leukocyte esterase, 33 WBC and many bacteria. Patient was imaged with CXR showing no focal consolidation, CT head without acute findings and CT abd/pelv with IV contrast showing 1.8cm L hepatic lobe cyst, RLQ transplant kidney with wedge shaped area of decreased enhancement in the lower pole and mild surrounding perinephric fat stranding along with additional subcentimeter hypodense lesions too small for accurate characterization, adnexal cyst measuring 8.3x7.0cm, and colonic diverticulosis without diverticulitis. Patient admitted and now developing borderline low BP 91/47. Patient on 6/1 fever to Tmax of 103     Antimicrobials:  vancomycin 5/31  piperacillin/tazobactam 5/31 - current    Assessment:  *RLQ transplant kidney with decreased areas of enhancement, this could be pyelonephritis, UA has mild pyuria (patient is immunosuppressed which can cause a muted immune response) but this may also be due to infarct which can also lead to leukocytosis and pyuria, unclear at this time  *Sepsis as evidenced by leukocytosis and tachycardia  *Pyrexia    *Diarrhea unclear etiology, no colitis on CT scan   *Mildly elevated procal 0.15  *DM  *Hypothyroidism  *HX of TBI with subdural hematoma in 2023    Recommendations:   - continue piperacillin/tazobactam   - would consider work up of possible renal infarct consider presentation though there is likely also an aspect of infection  - trend temperature and WBC   - follow blood and urine cultures (received by lab with results pending), adjust antimicrobial therapy based off of culture and sensitivity     Wei Harris DO, PGY-5   Infectious Disease Fellow  [facility] Teams Preferred  After 5pm/weekends call [phone]

## 2025-06-01 NOTE — H&P ADULT - NSHPSOCIALHISTORY_GEN_ALL_CORE
Lives at home by herself, help from daughters at home  Former smoker (quit in her 30s)  No ETOH  Retired

## 2025-06-01 NOTE — PATIENT PROFILE ADULT - FALL HARM RISK - HARM RISK INTERVENTIONS
Assistance with ambulation/Assistance OOB with selected safe patient handling equipment/Communicate Risk of Fall with Harm to all staff/Discuss with provider need for PT consult/Monitor for mental status changes/Monitor gait and stability/Reinforce activity limits and safety measures with patient and family/Reorient to person, place and time as needed/Review medications for side effects contributing to fall risk/Sit up slowly, dangle for a short time, stand at bedside before walking/Tailored Fall Risk Interventions/Use of alarms - bed, chair and/or voice tab/Visual Cue: Yellow wristband and red socks/Bed in lowest position, wheels locked, appropriate side rails in place/Call bell, personal items and telephone in reach/Instruct patient to call for assistance before getting out of bed or chair/Non-slip footwear when patient is out of bed/[place] to call system/Physically safe environment - no spills, clutter or unnecessary equipment/Purposeful Proactive Rounding/Room/bathroom lighting operational, light cord in reach

## 2025-06-01 NOTE — CONSULT NOTE ADULT - ATTENDING COMMENTS
83 yo woman kidney transplant 2011 on tacro, mycophenolate, prednisone  presenting with fever 103 lethargy   pyuria and wedged shaped area right transplanted kidney   Pyelonephritis in patient with transplanted kidney   Would continue empiric Zosyn   Follow cultures
Patient with history of renal transplant 2012, no reported acute rejections, recurrent UTI, who presented with complaints of weakness, abdominal discomfort, chills and fever.  Patient takes Prednisone, Tacrolimus and MMF for immunosuppression and family notes compliance with regimen.  Imaging showed wedge area of decreased enhancement over the renal transplant.  Started on Zosyn for possible pyelonephritis.  Urinalysis suggestive of infection.    1. Renal transplant - renal function as noted.  Urinalysis was without blood.  Suspect that the area of decreased enhancement may be related to pyelonephritis though infarct can not be ruled out.  Doppler ultrasound ordered with results pending.  Would suggest sending LDH and CK with 6/2 labs along with repeat urinalysis.    2. Immunosuppression - hold MMF, continue Prednisone and Tacrolimus.  Please send Tacrolimus trough level for 6/2 morning labs.    3. UTI/pyelonephritis - Infectious disease consulting.  Continue on current regimen pending culture results.    4. Hyponatremia - repeat 6/2.  Further recommendations per course.   Reviewed with family and patient at the bedside.  Will monitor course.  Further recommendations per course.    Recommended labs for 6/2 - LDH, CK, urinalysis, PTT, CBC, CMP, Tacrolimus trough.

## 2025-06-01 NOTE — CHART NOTE - NSCHARTNOTEFT_GEN_A_CORE
Spoke with ER and informed them to hold MMF in meantime and to obtain a renal transplant arterial doppler US. Spoke with ER and informed them to hold MMF in meantime, get ID consult and to obtain a renal transplant arterial doppler US.

## 2025-06-01 NOTE — DIETITIAN INITIAL EVALUATION ADULT - PERTINENT LABORATORY DATA
05-31    136  |  98  |  15  ----------------------------<  157[H]  4.1   |  20[L]  |  0.76    Ca    9.2      31 May 2025 21:52    TPro  7.6  /  Alb  4.1  /  TBili  1.0  /  DBili  x   /  AST  15  /  ALT  10  /  AlkPhos  79  05-31  POCT Blood Glucose.: 129 mg/dL (06-01-25 @ 08:25)  A1C with Estimated Average Glucose Result: 7.2 % (03-28-25 @ 07:35)

## 2025-06-01 NOTE — PROVIDER CONTACT NOTE (OTHER) - BACKGROUND
pt is 53 year old female, admitted with UTI
83 y/o female admitted for UTI with hx of renal transplant, type 2 DM, HTN
pt is 53 year old female, admitted with UTI
pt is 53 year old female, admitted with UTI
Patient admitted for urinary tract infection with pmh of type 2 diabetes, HTN, and hypothyrodism.

## 2025-06-01 NOTE — H&P ADULT - HISTORY OF PRESENT ILLNESS
85 y/o F with PMH of ESRD s/p renal transplant (2012), chronic UTI (on suppressive methamine), HTN, benign tremors, hypothyroid, DMT2, presenting with 3 day history of weakness and fatigue. No urinary symptoms, denies frequency, urgency, foul odor. Similar presentations in the past   83 y/o F with PMH of ESRD s/p renal transplant (2012), chronic UTI (on suppressive methamine), HTN, essential tremor, hypothyroid, DMT2, presenting with 3 day history of weakness and fatigue. She had several days of diarrhea preceding weakness and fatigue but that has resolved. Denies cough/congestion/URI symptoms. No urinary symptoms, denies frequency, urgency, foul odor. Similar presentations in the past when she has UTI's.     Denies CP, SOB, decreased urinary output.

## 2025-06-01 NOTE — PROVIDER CONTACT NOTE (OTHER) - ASSESSMENT
pt a&0 3, no signs of distress noted
BP 91/54; not in distress, alert and responsive
Patient is A&O times between 3-4 (somnolent). very sleeply, denied headache, dizziness, or chills. VS: BP: 115/45, HR: 63, temp: 97.4 (axillary), RR: 16, and SPO2: 96%. IV fluid of LR is at 75 ml/hr.
pt a&0 3, pt states she is very tired
pt a&0 3, no signs of distress noted

## 2025-06-01 NOTE — H&P ADULT - ASSESSMENT
85 y/o F with PMH of ESRD s/p renal transplant (2012), chronic UTI (on suppressive methamine), HTN, benign tremors, hypothyroid, DMT2, presenting with 3 day history of weakness and fatigue. No urinary symptoms, denies frequency, urgency, foul odor. Similar presentations in the past   85 y/o F with PMH of ESRD s/p renal transplant (2012), chronic UTI (on suppressive methamine), HTN, benign tremors, hypothyroid, DMT2, presenting with severe sepsis likely 2/2 UTI +/- pyelonephritis in transplanted kidney.

## 2025-06-01 NOTE — DIETITIAN INITIAL EVALUATION ADULT - OTHER INFO
83 y/o female with hx renal transplant (2011), DM, HTN and hypothyroidism admitted with UTI. Pt reports that she has had a poor PO intake PTA for a few days. She trys to limit high sugar foods and consumes a diabetic diet at baseline. Pt confirms NKFA, denies difficulties chewing/swallowing. Pt lives at home, independent with ADLs PTA. Notable medications PTA per chart inclusive of metformin, simvastatin, metoprolol, prednisone, tacrolimus, amlodipine along with a multivitamin for micronutrient support. Pt reported that she was unaware of any wt changes PTA;  lbs with current admit wt 119 lbs. Pt said that her wt varies as she takes prednisone which sometimes can make her more hungry and affect her weight.    Pt reports continued poor appetite/PO intake due to not feeling well. Pt requires assistance with tray set up but is able to feed self independently. No BMs per flowsheets. Consider bowel regimen as appropriate. Labs notable for FS over the past 24 hrs 129 - 198 mg/dl. Please obtain new Hb A1C lab to assess trend. Offered oral supplementation with patient stating she would try Reduced Sugar Magic Cups (290 kcal, 9 gm protein per 4 oz) x 2/day. Reinforced dietary restrictions with patient. Encourage and monitor oral intake, especially of nutrition supplement. RDN services to remain available as needed.

## 2025-06-01 NOTE — H&P ADULT - PROBLEM SELECTOR PLAN 2
History of renal transplant in 2012    -Follows with Dr. Musa Urbina ([facility])  -Hold MMF with severe sepsis  -c/w prednisone 5mg daily  -c/w tacrolimus 1.5mg BID, check level 1hr prior to AM dose daily  -Renal consult

## 2025-06-01 NOTE — H&P ADULT - ATTENDING COMMENTS
83 y/o F with PMH of ESRD s/p renal transplant (2012), frequent UTI (on suppressive methenamine), HTN, benign tremors, hypothyroid, DMT2, presenting with AMS found to have severe sepsis 2/2 pyelonephritis in transplanted kidney. Daughter who is an NP assisted with history taking. States mom was very lethargic and sleepy yesterday, which is how patient normally behaves during a UTI. Patient was started on vanco/zosyn in ED and continued on Zosyn. Patient was hypotensive this AM but improved with further antibiotic treatment. CT abd showed wedge shaped infarct on transplanted kidney pyelonephritis vs infarct    VSS. BP improved  General: Alert and no acute distress - improved per dtr. Aox2  Eye: Pupils are equal, round and reactive to light and extraocular movements are intact.  Ears, nose, mouth and throat: Tympanic membranes clear and oral mucosa moist.  Cardiovascular: Regular rate and rhythm, No murmur  Respiratory: Lungs are clear to auscultation.  Gastrointestinal: Soft, Nontender, Normal bowel sounds and No organomegaly. no tenderness over transplanted kidney    Severe sepsis  Pyelonephritis  UTI  Possible renal infarct  Transplanted kidney    - given sepsis fluids with improved BP  - LA neg  - s/p vanc/zosyn  - cw zosyn  - f/u UCX BCX  - consult transplant ID/nephro  - f/u vascular US prior to further infarct workup  - cw tacrolimus 1.5mg q12h 9a/9p  - check tacro level  - hold MMF  - cw prednisone 5 mg qd  - dtr asked me to reach out to primary nephrologist - I texted but no response yet.    rest of plan as above discussed with resident

## 2025-06-01 NOTE — PROVIDER CONTACT NOTE (OTHER) - ACTION/TREATMENT ORDERED:
MD made aware and with order to give IV bolus of LR 500ml
MD Kandice Nolan aware, waiting for fluid orders
MD notified and aware. No needed to increate the rate the IV fluids. Continue to Monitor for now.
MD Kandice Nolan aware, no new interventions at this time
MD Kandice Nolan aware, waiting for tylenol orders

## 2025-06-01 NOTE — PROVIDER CONTACT NOTE (OTHER) - RECOMMENDATIONS
notify MD Kandice Nolan
Increases the rate of IV fluids
notify MD
notify MD Kandice Nolan
notify MD Kandice Nolan

## 2025-06-01 NOTE — CONSULT NOTE ADULT - PROBLEM SELECTOR RECOMMENDATION 3
UA-cloudy, 30 protein, 40 ketones, small blood and leuks, 33 wbc, (+) nitrites, many bacteria. CT A/P showed wedge shaped area of decreased enhancement in right renal transplant, can by pyelo or infarct. Rec to get transplant renal US. Rec ID consult. Pt started on Zosyn.       Please call if you have any questions  Ming Toribio, PGY4  Nephrology Fellow  [id number]/Teams preferred  (After 5PM or weekends, reach out to fellow on call) UA-cloudy, 30 protein, 40 ketones, small blood and leuks, 33 wbc, (+) nitrites, many bacteria. CT A/P showed wedge shaped area of decreased enhancement in right renal transplant, can by pyelo or infarct. Rec to get transplant renal US. Rec ID consult. F/u cxs. Pt started on Zosyn.       Please call if you have any questions  Ming Toribio, PGY4  Nephrology Fellow  [id number]/Teams preferred  (After 5PM or weekends, reach out to fellow on call)

## 2025-06-01 NOTE — H&P ADULT - PROBLEM SELECTOR PLAN 1
likely 2/2 UTI with concern for pyelonephritis s/p 2L fluid resuscitation for relative hypotension. BP now stable    -f/u urine culture, blood culture  -c/w empiric Zosyn  -ID consult  -Wedge shaped infarct in transplant kidney could represent pyelo vs infarct. f/u VA duplex of transplant kidney. Will check TTE if there is evidence of thrombus.   -Hold suppressive abx for now, resume methamine at discharge.   -Unclear etiology of diarrhea, now resolved. CT without evidence of colitis. likely 2/2 UTI with concern for pyelonephritis s/p 2L fluid resuscitation for relative hypotension. BP now stable    -f/u urine culture, blood culture  -c/w empiric Zosyn  -ID consult  -Wedge shaped infarct in transplant kidney could represent pyelo vs infarct. f/u VA duplex of transplant kidney. Will check TTE if there is evidence of thrombus.   -Hold suppressive abx for now, resume methamine at discharge.   -Unclear etiology of diarrhea, now resolved. CT without evidence of colitis.  -LR@75cc/hr x24hrs

## 2025-06-01 NOTE — DIETITIAN INITIAL EVALUATION ADULT - PERTINENT MEDS FT
MEDICATIONS  (STANDING):  lactated ringers. 1000 milliLiter(s) (75 mL/Hr) IV Continuous <Continuous>  piperacillin/tazobactam IVPB.. 3.375 Gram(s) IV Intermittent every 8 hours    MEDICATIONS  (PRN):

## 2025-06-01 NOTE — H&P ADULT - NSHPPHYSICALEXAM_GEN_ALL_CORE
VITALS:   T(C): 36.9 (06-01-25 @ 05:59), Max: 39.3 (05-31-25 @ 21:43)  HR: 86 (06-01-25 @ 05:59) (72 - 105)  BP: 91/54 (06-01-25 @ 06:30) (91/54 - 136/74)  RR: 17 (06-01-25 @ 05:59) (17 - 18)  SpO2: 95% (06-01-25 @ 05:59) (95% - 100%)    GENERAL: NAD, lying in bed comfortably  HEAD:  Atraumatic, Normocephalic  EYES: EOMI, PERRLA, conjunctiva and sclera clear  ENT: Moist mucous membranes  NECK: Supple, No JVD  CHEST/LUNG: Clear to auscultation bilaterally; No rales, rhonchi, wheezing, or rubs. Unlabored respirations  HEART: Regular rate and rhythm; No murmurs, rubs, or gallops  ABDOMEN: BSx4; Soft, nontender, nondistended  EXTREMITIES:  2+ Peripheral Pulses, brisk capillary refill. No clubbing, cyanosis, or edema  NERVOUS SYSTEM:  A&Ox3, no focal deficits   SKIN: No rashes or lesions  Psych: Normal speech, normal behavior, normal affect VITALS:   T(C): 36.9 (06-01-25 @ 05:59), Max: 39.3 (05-31-25 @ 21:43)  HR: 86 (06-01-25 @ 05:59) (72 - 105)  BP: 91/54 (06-01-25 @ 06:30) (91/54 - 136/74)  RR: 17 (06-01-25 @ 05:59) (17 - 18)  SpO2: 95% (06-01-25 @ 05:59) (95% - 100%)    GENERAL: NAD, lying in bed comfortably  HEAD:  Atraumatic, Normocephalic  EYES: EOMI, PERRLA, conjunctiva and sclera clear  ENT: Moist mucous membranes  NECK: Supple, No JVD  CHEST/LUNG: Clear to auscultation bilaterally; No rales, rhonchi, wheezing, or rubs. Unlabored respirations  HEART: Regular rate and rhythm; No murmurs, rubs, or gallops  ABDOMEN: BSx4; Soft, nontender, nondistended  EXTREMITIES:  2+ Peripheral Pulses, brisk capillary refill. No clubbing, cyanosis, or edema  NERVOUS SYSTEM:  A&Ox3, no focal deficits. Mild confusion per collateral with family  SKIN: No rashes or lesions  Psych: Normal speech, normal behavior, normal affect

## 2025-06-01 NOTE — ED ADULT NURSE REASSESSMENT NOTE - NS ED NURSE REASSESS COMMENT FT1
Pt endorses feeling nauseous and having spit ups. VS as charted. MAR made aware, medicated as per eMAR. Pending reassessment. Pending transport to bed assignment.
break rn, Pt awake and alert,  resp even and unlabored. Pt put back on fluids after abx finished. Pt has no complaints at this time. Denies CP, SOB, HA, dizziness, palpitations, blurry vision. Resting comfortably. Safety maintained, plan of care ongoing, pending CT scan.
Pt noted to be in no acute distress. RR even and unlabored. Denies headache, dizziness, nausea, vomiting. Pt ambulated from stretcher to bathroom and back. Pt safety maintained. Pending imaging results. Pending bed assignment.

## 2025-06-01 NOTE — DIETITIAN INITIAL EVALUATION ADULT - WEIGHT IN LBS
Spiritual Plan of Care    Pt Name: Sushila Linda  Pt : 1967  Date: 2024    Visit Type: (P) In person    Referral Source: (P) Interdisciplinary team    Reason for Visit: Advance Directives    Visited With: Patient    Length of Visit: 45 minutes      Taxonomy:    Intended Effects: Aligning care plan with patient's values, Demonstrate caring and concern  Methods: Collaborate with care team member  Interventions: Active listening, Ask guided questions about the nature and presence of God, Explain  role, Facilitate advance care planning, Assist someone with Advance Directives      Patient Affect at Time of Visit: Alert, Pleasant, Talkative, Open to  Visit  Patient Assessment: Coping , Relies on kyra  Patient  Intervention: Advance directive, Affirmation, Empathic Listening, Emotional Support    Palliative Care Referral for change in POAHC.  Patient request to update POAHC.   assisted patient with completing new POAHC.  Patient named her spouse, Anand Linda as primary agent and Nabila Joyce (friend) as successor agent.   provided copies for medical chart and patient.   also provided emotional and spiritual support through empathic listening and affirmation as patient shared feelings and life stories.    Rev. Griselda Schwartz MDiv, [facility]  Senior Staff   [facility]  [phone]   112

## 2025-06-02 DIAGNOSIS — G93.41 METABOLIC ENCEPHALOPATHY: ICD-10-CM

## 2025-06-02 LAB
ALBUMIN SERPL ELPH-MCNC: 3.4 G/DL — SIGNIFICANT CHANGE UP (ref 3.3–5)
ALP SERPL-CCNC: 59 U/L — SIGNIFICANT CHANGE UP (ref 40–120)
ALT FLD-CCNC: 8 U/L — SIGNIFICANT CHANGE UP (ref 4–33)
ANION GAP SERPL CALC-SCNC: 13 MMOL/L — SIGNIFICANT CHANGE UP (ref 7–14)
AST SERPL-CCNC: 17 U/L — SIGNIFICANT CHANGE UP (ref 4–32)
BILIRUB SERPL-MCNC: 0.5 MG/DL — SIGNIFICANT CHANGE UP (ref 0.2–1.2)
BUN SERPL-MCNC: 12 MG/DL — SIGNIFICANT CHANGE UP (ref 7–23)
CALCIUM SERPL-MCNC: 8.6 MG/DL — SIGNIFICANT CHANGE UP (ref 8.4–10.5)
CHLORIDE SERPL-SCNC: 93 MMOL/L — LOW (ref 98–107)
CMV DNA CSF QL NAA+PROBE: SIGNIFICANT CHANGE UP IU/ML
CMV DNA SPEC NAA+PROBE-LOG#: SIGNIFICANT CHANGE UP LOG10IU/ML
CO2 SERPL-SCNC: 23 MMOL/L — SIGNIFICANT CHANGE UP (ref 22–31)
CREAT ?TM UR-MCNC: 30 MG/DL — SIGNIFICANT CHANGE UP
CREAT SERPL-MCNC: 0.81 MG/DL — SIGNIFICANT CHANGE UP (ref 0.5–1.3)
EGFR: 71 ML/MIN/1.73M2 — SIGNIFICANT CHANGE UP
EGFR: 71 ML/MIN/1.73M2 — SIGNIFICANT CHANGE UP
GLUCOSE BLDC GLUCOMTR-MCNC: 144 MG/DL — HIGH (ref 70–99)
GLUCOSE BLDC GLUCOMTR-MCNC: 196 MG/DL — HIGH (ref 70–99)
GLUCOSE BLDC GLUCOMTR-MCNC: 211 MG/DL — HIGH (ref 70–99)
GLUCOSE SERPL-MCNC: 127 MG/DL — HIGH (ref 70–99)
HCT VFR BLD CALC: 34.6 % — SIGNIFICANT CHANGE UP (ref 34.5–45)
HGB BLD-MCNC: 11.1 G/DL — LOW (ref 11.5–15.5)
MAGNESIUM SERPL-MCNC: 1.5 MG/DL — LOW (ref 1.6–2.6)
MCHC RBC-ENTMCNC: 28.5 PG — SIGNIFICANT CHANGE UP (ref 27–34)
MCHC RBC-ENTMCNC: 32.1 G/DL — SIGNIFICANT CHANGE UP (ref 32–36)
MCV RBC AUTO: 88.9 FL — SIGNIFICANT CHANGE UP (ref 80–100)
NRBC # BLD AUTO: 0 K/UL — SIGNIFICANT CHANGE UP (ref 0–0)
NRBC # FLD: 0 K/UL — SIGNIFICANT CHANGE UP (ref 0–0)
NRBC BLD AUTO-RTO: 0 /100 WBCS — SIGNIFICANT CHANGE UP (ref 0–0)
OSMOLALITY UR: 158 MOSM/KG — SIGNIFICANT CHANGE UP (ref 50–1200)
PHOSPHATE SERPL-MCNC: 2.3 MG/DL — LOW (ref 2.5–4.5)
PLATELET # BLD AUTO: 201 K/UL — SIGNIFICANT CHANGE UP (ref 150–400)
POTASSIUM SERPL-MCNC: 3.5 MMOL/L — SIGNIFICANT CHANGE UP (ref 3.5–5.3)
POTASSIUM SERPL-SCNC: 3.5 MMOL/L — SIGNIFICANT CHANGE UP (ref 3.5–5.3)
POTASSIUM UR-SCNC: 11.5 MMOL/L — SIGNIFICANT CHANGE UP
PROT ?TM UR-MCNC: 7 MG/DL — SIGNIFICANT CHANGE UP
PROT SERPL-MCNC: 6.1 G/DL — SIGNIFICANT CHANGE UP (ref 6–8.3)
PROT/CREAT UR-RTO: 0.2 RATIO — SIGNIFICANT CHANGE UP (ref 0–0.2)
RBC # BLD: 3.89 M/UL — SIGNIFICANT CHANGE UP (ref 3.8–5.2)
RBC # FLD: 13.6 % — SIGNIFICANT CHANGE UP (ref 10.3–14.5)
SODIUM SERPL-SCNC: 129 MMOL/L — LOW (ref 135–145)
SODIUM UR-SCNC: <20 MMOL/L — SIGNIFICANT CHANGE UP
TACROLIMUS SERPL-MCNC: 8.9 NG/ML — SIGNIFICANT CHANGE UP
UUN UR-MCNC: 216.1 MG/DL — SIGNIFICANT CHANGE UP
WBC # BLD: 11.15 K/UL — HIGH (ref 3.8–10.5)
WBC # FLD AUTO: 11.15 K/UL — HIGH (ref 3.8–10.5)

## 2025-06-02 PROCEDURE — 99232 SBSQ HOSP IP/OBS MODERATE 35: CPT | Mod: GC

## 2025-06-02 PROCEDURE — 99232 SBSQ HOSP IP/OBS MODERATE 35: CPT

## 2025-06-02 PROCEDURE — G0545: CPT

## 2025-06-02 RX ORDER — MAGNESIUM SULFATE 500 MG/ML
2 SYRINGE (ML) INJECTION ONCE
Refills: 0 | Status: COMPLETED | OUTPATIENT
Start: 2025-06-02 | End: 2025-06-02

## 2025-06-02 RX ORDER — SOD PHOS DI, MONO/K PHOS MONO 250 MG
2 TABLET ORAL ONCE
Refills: 0 | Status: COMPLETED | OUTPATIENT
Start: 2025-06-02 | End: 2025-06-02

## 2025-06-02 RX ADMIN — Medication 1 APPLICATION(S): at 05:21

## 2025-06-02 RX ADMIN — Medication 25 GRAM(S): at 05:20

## 2025-06-02 RX ADMIN — ATORVASTATIN CALCIUM 10 MILLIGRAM(S): 80 TABLET, FILM COATED ORAL at 21:04

## 2025-06-02 RX ADMIN — Medication 25 GRAM(S): at 21:04

## 2025-06-02 RX ADMIN — Medication 25 GRAM(S): at 09:18

## 2025-06-02 RX ADMIN — INSULIN LISPRO 1: 100 INJECTION, SOLUTION INTRAVENOUS; SUBCUTANEOUS at 09:12

## 2025-06-02 RX ADMIN — Medication 75 MICROGRAM(S): at 05:20

## 2025-06-02 RX ADMIN — TACROLIMUS 1.5 MILLIGRAM(S): 0.5 CAPSULE ORAL at 09:13

## 2025-06-02 RX ADMIN — ENOXAPARIN SODIUM 40 MILLIGRAM(S): 100 INJECTION SUBCUTANEOUS at 12:25

## 2025-06-02 RX ADMIN — SODIUM CHLORIDE 75 MILLILITER(S): 9 INJECTION, SOLUTION INTRAVENOUS at 02:25

## 2025-06-02 RX ADMIN — INSULIN LISPRO 2: 100 INJECTION, SOLUTION INTRAVENOUS; SUBCUTANEOUS at 12:24

## 2025-06-02 RX ADMIN — Medication 2 PACKET(S): at 09:15

## 2025-06-02 RX ADMIN — TACROLIMUS 1.5 MILLIGRAM(S): 0.5 CAPSULE ORAL at 21:04

## 2025-06-02 RX ADMIN — INSULIN LISPRO 1: 100 INJECTION, SOLUTION INTRAVENOUS; SUBCUTANEOUS at 17:37

## 2025-06-02 RX ADMIN — Medication 40 MILLIEQUIVALENT(S): at 09:14

## 2025-06-02 RX ADMIN — Medication 25 GRAM(S): at 13:36

## 2025-06-02 RX ADMIN — PREDNISONE 5 MILLIGRAM(S): 20 TABLET ORAL at 06:22

## 2025-06-02 RX ADMIN — Medication 1 TABLET(S): at 12:25

## 2025-06-02 NOTE — PROGRESS NOTE ADULT - PROBLEM SELECTOR PLAN 1
likely 2/2 UTI with concern for pyelonephritis s/p 2L fluid resuscitation for relative hypotension-improved with volume resuscitation     -f/u urine culture, blood culture  -c/w empiric Zosyn pending culture data  -Wedge shaped infarct in transplant kidney could represent pyelo vs infarct. f/u VA duplex of transplant kidney. Will check TTE if there is evidence of thrombus.   -Hold suppressive abx for now, resume methamine at discharge.   -Unclear etiology of diarrhea, now resolved. CT without evidence of colitis.

## 2025-06-02 NOTE — PROGRESS NOTE ADULT - PROBLEM SELECTOR PLAN 3
A1C: 7.5%    -LOBO qAC and qHS  -Home med: metformin 750mg ER History of renal transplant in 2012    -Follows with Dr. Musa Urbina ([facility])  -Hold MMF with severe sepsis  -c/w prednisone 5mg daily  -c/w tacrolimus 1.5mg BID, check level 1hr prior to AM dose daily  -Renal consult

## 2025-06-02 NOTE — PROGRESS NOTE ADULT - PROBLEM SELECTOR PLAN 2
History of renal transplant in 2012    -Follows with Dr. Musa Urbina ([facility])  -Hold MMF with severe sepsis  -c/w prednisone 5mg daily  -c/w tacrolimus 1.5mg BID, check level 1hr prior to AM dose daily  -Renal consult Likely in setting of sepsis, treating as above  Mental status improving but still remains slightly confused    - CT head on admission stable   - Remote hx of TBI, at baseline A&Ox3 and lives alone   - PT eval

## 2025-06-02 NOTE — PHYSICAL THERAPY INITIAL EVALUATION ADULT - ADDITIONAL COMMENTS
Lives alone in an apartment with elevator access if needed (pt prefers the stairs). Independent with ambulation and ADLs.    After session, patient left semi supine in bed with all lines intact in NAD. +Bed alarm. RN aware.

## 2025-06-02 NOTE — PROGRESS NOTE ADULT - PROBLEM SELECTOR PLAN 5
Hold all antihypertensives here in the setting of sepsis    -Home meds: Metoprolol succinate 50mg daily, Enalapril 10mg daily, Norvasc 2.5mg daily -Low TSH and borderline elevated free T4  -c/w synthroid 75mcg daily  -Would repeat TFT in 4-6 weeks, may need to consider dose adjustment as outpt

## 2025-06-02 NOTE — PROGRESS NOTE ADULT - PROBLEM SELECTOR PLAN 6
DVT ppx: Lovenox 40mg daily   Diet: CC diet  Dispo: likely home, PT eval Hold all antihypertensives here in the setting of sepsis    -Home meds: Metoprolol succinate 50mg daily, Enalapril 10mg daily, Norvasc 2.5mg daily

## 2025-06-02 NOTE — PROGRESS NOTE ADULT - PROBLEM SELECTOR PLAN 4
-Low TSH and borderline elevated free T4  -c/w synthroid 75mcg daily  -Would repeat TFT in 4-6 weeks, may need to consider dose adjustment as outpt A1C: 7.5%    -LOBO qAC and qHS  -Home med: metformin 750mg ER

## 2025-06-02 NOTE — PHYSICAL THERAPY INITIAL EVALUATION ADULT - PERTINENT HX OF CURRENT PROBLEM, REHAB EVAL
84 year old female with PMH of ESRD s/p renal transplant (2012), chronic UTI (on suppressive methamine), HTN, benign tremors, hypothyroid, DMT2, presenting with severe sepsis likely 2/2 UTI +/- pyelonephritis in transplanted kidney.

## 2025-06-03 DIAGNOSIS — N12 TUBULO-INTERSTITIAL NEPHRITIS, NOT SPECIFIED AS ACUTE OR CHRONIC: ICD-10-CM

## 2025-06-03 DIAGNOSIS — Z94.0 KIDNEY TRANSPLANT STATUS: ICD-10-CM

## 2025-06-03 LAB
ALBUMIN SERPL ELPH-MCNC: 3.4 G/DL — SIGNIFICANT CHANGE UP (ref 3.3–5)
ALP SERPL-CCNC: 65 U/L — SIGNIFICANT CHANGE UP (ref 40–120)
ALT FLD-CCNC: 12 U/L — SIGNIFICANT CHANGE UP (ref 4–33)
ANION GAP SERPL CALC-SCNC: 11 MMOL/L — SIGNIFICANT CHANGE UP (ref 7–14)
AST SERPL-CCNC: 19 U/L — SIGNIFICANT CHANGE UP (ref 4–32)
BILIRUB SERPL-MCNC: 0.3 MG/DL — SIGNIFICANT CHANGE UP (ref 0.2–1.2)
BUN SERPL-MCNC: 14 MG/DL — SIGNIFICANT CHANGE UP (ref 7–23)
CALCIUM SERPL-MCNC: 8.9 MG/DL — SIGNIFICANT CHANGE UP (ref 8.4–10.5)
CHLORIDE SERPL-SCNC: 102 MMOL/L — SIGNIFICANT CHANGE UP (ref 98–107)
CO2 SERPL-SCNC: 24 MMOL/L — SIGNIFICANT CHANGE UP (ref 22–31)
CREAT SERPL-MCNC: 0.94 MG/DL — SIGNIFICANT CHANGE UP (ref 0.5–1.3)
EGFR: 59 ML/MIN/1.73M2 — LOW
EGFR: 59 ML/MIN/1.73M2 — LOW
GLUCOSE BLDC GLUCOMTR-MCNC: 152 MG/DL — HIGH (ref 70–99)
GLUCOSE BLDC GLUCOMTR-MCNC: 154 MG/DL — HIGH (ref 70–99)
GLUCOSE BLDC GLUCOMTR-MCNC: 169 MG/DL — HIGH (ref 70–99)
GLUCOSE BLDC GLUCOMTR-MCNC: 243 MG/DL — HIGH (ref 70–99)
GLUCOSE SERPL-MCNC: 140 MG/DL — HIGH (ref 70–99)
HCT VFR BLD CALC: 34.5 % — SIGNIFICANT CHANGE UP (ref 34.5–45)
HGB BLD-MCNC: 11.2 G/DL — LOW (ref 11.5–15.5)
MAGNESIUM SERPL-MCNC: 2.1 MG/DL — SIGNIFICANT CHANGE UP (ref 1.6–2.6)
MCHC RBC-ENTMCNC: 28.3 PG — SIGNIFICANT CHANGE UP (ref 27–34)
MCHC RBC-ENTMCNC: 32.5 G/DL — SIGNIFICANT CHANGE UP (ref 32–36)
MCV RBC AUTO: 87.1 FL — SIGNIFICANT CHANGE UP (ref 80–100)
NRBC # BLD AUTO: 0 K/UL — SIGNIFICANT CHANGE UP (ref 0–0)
NRBC # FLD: 0 K/UL — SIGNIFICANT CHANGE UP (ref 0–0)
NRBC BLD AUTO-RTO: 0 /100 WBCS — SIGNIFICANT CHANGE UP (ref 0–0)
PHOSPHATE SERPL-MCNC: 2.3 MG/DL — LOW (ref 2.5–4.5)
PLATELET # BLD AUTO: 235 K/UL — SIGNIFICANT CHANGE UP (ref 150–400)
POTASSIUM SERPL-MCNC: 4.2 MMOL/L — SIGNIFICANT CHANGE UP (ref 3.5–5.3)
POTASSIUM SERPL-SCNC: 4.2 MMOL/L — SIGNIFICANT CHANGE UP (ref 3.5–5.3)
PROT SERPL-MCNC: 6.3 G/DL — SIGNIFICANT CHANGE UP (ref 6–8.3)
RBC # BLD: 3.96 M/UL — SIGNIFICANT CHANGE UP (ref 3.8–5.2)
RBC # FLD: 13.4 % — SIGNIFICANT CHANGE UP (ref 10.3–14.5)
SODIUM SERPL-SCNC: 137 MMOL/L — SIGNIFICANT CHANGE UP (ref 135–145)
T4 FREE SERPL-MCNC: 1.4 NG/DL — SIGNIFICANT CHANGE UP (ref 0.9–1.8)
TACROLIMUS SERPL-MCNC: 14.6 NG/ML — SIGNIFICANT CHANGE UP
TSH SERPL-MCNC: 0.3 UIU/ML — SIGNIFICANT CHANGE UP (ref 0.27–4.2)
WBC # BLD: 8.16 K/UL — SIGNIFICANT CHANGE UP (ref 3.8–10.5)
WBC # FLD AUTO: 8.16 K/UL — SIGNIFICANT CHANGE UP (ref 3.8–10.5)

## 2025-06-03 PROCEDURE — 99232 SBSQ HOSP IP/OBS MODERATE 35: CPT

## 2025-06-03 PROCEDURE — G0545: CPT

## 2025-06-03 PROCEDURE — 99231 SBSQ HOSP IP/OBS SF/LOW 25: CPT

## 2025-06-03 PROCEDURE — 99232 SBSQ HOSP IP/OBS MODERATE 35: CPT | Mod: GC

## 2025-06-03 RX ORDER — SODIUM PHOSPHATE,DIBASIC DIHYD
15 POWDER (GRAM) MISCELLANEOUS ONCE
Refills: 0 | Status: DISCONTINUED | OUTPATIENT
Start: 2025-06-03 | End: 2025-06-03

## 2025-06-03 RX ORDER — TACROLIMUS 0.5 MG/1
1 CAPSULE ORAL
Refills: 0 | Status: DISCONTINUED | OUTPATIENT
Start: 2025-06-03 | End: 2025-06-04

## 2025-06-03 RX ORDER — SOD PHOS DI, MONO/K PHOS MONO 250 MG
2 TABLET ORAL ONCE
Refills: 0 | Status: COMPLETED | OUTPATIENT
Start: 2025-06-03 | End: 2025-06-03

## 2025-06-03 RX ORDER — METOPROLOL SUCCINATE 50 MG/1
50 TABLET, EXTENDED RELEASE ORAL DAILY
Refills: 0 | Status: DISCONTINUED | OUTPATIENT
Start: 2025-06-03 | End: 2025-06-04

## 2025-06-03 RX ADMIN — INSULIN LISPRO 1: 100 INJECTION, SOLUTION INTRAVENOUS; SUBCUTANEOUS at 12:28

## 2025-06-03 RX ADMIN — PREDNISONE 5 MILLIGRAM(S): 20 TABLET ORAL at 06:21

## 2025-06-03 RX ADMIN — TACROLIMUS 1 MILLIGRAM(S): 0.5 CAPSULE ORAL at 21:40

## 2025-06-03 RX ADMIN — Medication 1 TABLET(S): at 12:32

## 2025-06-03 RX ADMIN — Medication 2 PACKET(S): at 09:42

## 2025-06-03 RX ADMIN — Medication 25 GRAM(S): at 21:31

## 2025-06-03 RX ADMIN — ATORVASTATIN CALCIUM 10 MILLIGRAM(S): 80 TABLET, FILM COATED ORAL at 21:30

## 2025-06-03 RX ADMIN — Medication 75 MICROGRAM(S): at 05:07

## 2025-06-03 RX ADMIN — Medication 25 GRAM(S): at 02:00

## 2025-06-03 RX ADMIN — INSULIN LISPRO 2: 100 INJECTION, SOLUTION INTRAVENOUS; SUBCUTANEOUS at 17:25

## 2025-06-03 RX ADMIN — ENOXAPARIN SODIUM 40 MILLIGRAM(S): 100 INJECTION SUBCUTANEOUS at 12:30

## 2025-06-03 RX ADMIN — INSULIN LISPRO 1: 100 INJECTION, SOLUTION INTRAVENOUS; SUBCUTANEOUS at 09:03

## 2025-06-03 RX ADMIN — TACROLIMUS 1.5 MILLIGRAM(S): 0.5 CAPSULE ORAL at 09:02

## 2025-06-03 RX ADMIN — Medication 25 GRAM(S): at 05:07

## 2025-06-03 NOTE — PROGRESS NOTE ADULT - PROBLEM SELECTOR PLAN 3
History of renal transplant in 2012    -Follows with Dr. Musa Urbina ([facility])  -Hold MMF with severe sepsis  -c/w prednisone 5mg daily  -c/w tacrolimus 1.5mg BID, check level 1hr prior to AM dose daily

## 2025-06-03 NOTE — PROGRESS NOTE ADULT - PROBLEM SELECTOR PLAN 1
Pt. with kidney transplant (since 2012), on immunosuppressives. Pt. admitted with UTI. Scr WNL at 0.94 today. Serum tacrolimus trough level elevated at 8.9 yesterday. Recommend to decrease oral tacrolimus dose to 1 mg twice daily. Continue with oral prednisone 5 mg once daily. MMF on hold at present. Monitor labs/serum tacrolimus (~11.5-12 hr) trough level every day. Avoid nephrotoxins.

## 2025-06-03 NOTE — PROGRESS NOTE ADULT - PROBLEM SELECTOR PLAN 1
Urine culture: >100k GNR    -f/u speciation of GNR  -c/w empiric zosyn pending results  -Hold MMF  -ID following  -Blood cultures negative Urine culture: >100k GNR    -f/u speciation of GNR  -c/w empiric zosyn pending results  -Hold MMF  -ID following  -Blood cultures negative  -VA duplex of transplant kidney with good flow, no evidence of thrombus or stenosis. Wedge shaped opacification on CT imaging likely related to pyelonephritis

## 2025-06-03 NOTE — PROGRESS NOTE ADULT - PROBLEM SELECTOR PLAN 2
Likely in setting of sepsis, treating as above  Mental status improving but still remains slightly confused    - CT head on admission stable   - Remote hx of TBI, at baseline A&Ox3 and lives alone

## 2025-06-04 ENCOUNTER — TRANSCRIPTION ENCOUNTER (OUTPATIENT)
Age: 85
End: 2025-06-04

## 2025-06-04 VITALS
SYSTOLIC BLOOD PRESSURE: 140 MMHG | TEMPERATURE: 97 F | RESPIRATION RATE: 17 BRPM | DIASTOLIC BLOOD PRESSURE: 60 MMHG | OXYGEN SATURATION: 96 % | HEART RATE: 62 BPM

## 2025-06-04 LAB
-  AMPICILLIN/SULBACTAM: SIGNIFICANT CHANGE UP
-  AMPICILLIN: SIGNIFICANT CHANGE UP
-  AZTREONAM: SIGNIFICANT CHANGE UP
-  CEFAZOLIN: SIGNIFICANT CHANGE UP
-  CEFEPIME: SIGNIFICANT CHANGE UP
-  CEFTRIAXONE: SIGNIFICANT CHANGE UP
-  CEFUROXIME: SIGNIFICANT CHANGE UP
-  CIPROFLOXACIN: SIGNIFICANT CHANGE UP
-  ERTAPENEM: SIGNIFICANT CHANGE UP
-  GENTAMICIN: SIGNIFICANT CHANGE UP
-  IMIPENEM: SIGNIFICANT CHANGE UP
-  LEVOFLOXACIN: SIGNIFICANT CHANGE UP
-  MEROPENEM: SIGNIFICANT CHANGE UP
-  NITROFURANTOIN: SIGNIFICANT CHANGE UP
-  PIPERACILLIN/TAZOBACTAM: SIGNIFICANT CHANGE UP
-  TIGECYCLINE: SIGNIFICANT CHANGE UP
-  TOBRAMYCIN: SIGNIFICANT CHANGE UP
-  TRIMETHOPRIM/SULFAMETHOXAZOLE: SIGNIFICANT CHANGE UP
ANION GAP SERPL CALC-SCNC: 12 MMOL/L — SIGNIFICANT CHANGE UP (ref 7–14)
BKV DNA SPEC QL NAA+PROBE: SIGNIFICANT CHANGE UP
BUN SERPL-MCNC: 16 MG/DL — SIGNIFICANT CHANGE UP (ref 7–23)
CALCIUM SERPL-MCNC: 9 MG/DL — SIGNIFICANT CHANGE UP (ref 8.4–10.5)
CHLORIDE SERPL-SCNC: 104 MMOL/L — SIGNIFICANT CHANGE UP (ref 98–107)
CO2 SERPL-SCNC: 24 MMOL/L — SIGNIFICANT CHANGE UP (ref 22–31)
CREAT SERPL-MCNC: 0.86 MG/DL — SIGNIFICANT CHANGE UP (ref 0.5–1.3)
CULTURE RESULTS: ABNORMAL
EGFR: 66 ML/MIN/1.73M2 — SIGNIFICANT CHANGE UP
EGFR: 66 ML/MIN/1.73M2 — SIGNIFICANT CHANGE UP
GLUCOSE BLDC GLUCOMTR-MCNC: 147 MG/DL — HIGH (ref 70–99)
GLUCOSE BLDC GLUCOMTR-MCNC: 244 MG/DL — HIGH (ref 70–99)
GLUCOSE SERPL-MCNC: 128 MG/DL — HIGH (ref 70–99)
JCPYV DNA SPEC QL NAA+PROBE: SIGNIFICANT CHANGE UP
MAGNESIUM SERPL-MCNC: 1.9 MG/DL — SIGNIFICANT CHANGE UP (ref 1.6–2.6)
METHOD TYPE: SIGNIFICANT CHANGE UP
ORGANISM # SPEC MICROSCOPIC CNT: ABNORMAL
ORGANISM # SPEC MICROSCOPIC CNT: ABNORMAL
PHOSPHATE SERPL-MCNC: 3.1 MG/DL — SIGNIFICANT CHANGE UP (ref 2.5–4.5)
POTASSIUM SERPL-MCNC: 4.3 MMOL/L — SIGNIFICANT CHANGE UP (ref 3.5–5.3)
POTASSIUM SERPL-SCNC: 4.3 MMOL/L — SIGNIFICANT CHANGE UP (ref 3.5–5.3)
SODIUM SERPL-SCNC: 140 MMOL/L — SIGNIFICANT CHANGE UP (ref 135–145)
SPECIMEN SOURCE: SIGNIFICANT CHANGE UP
SPECIMEN SOURCE: SIGNIFICANT CHANGE UP
TACROLIMUS SERPL-MCNC: 7.7 NG/ML — SIGNIFICANT CHANGE UP

## 2025-06-04 PROCEDURE — 99232 SBSQ HOSP IP/OBS MODERATE 35: CPT

## 2025-06-04 PROCEDURE — 99239 HOSP IP/OBS DSCHRG MGMT >30: CPT

## 2025-06-04 PROCEDURE — G0545: CPT

## 2025-06-04 RX ORDER — ERTAPENEM SODIUM 1 G/1
INJECTION, POWDER, LYOPHILIZED, FOR SOLUTION INTRAMUSCULAR; INTRAVENOUS
Refills: 0 | Status: DISCONTINUED | OUTPATIENT
Start: 2025-06-04 | End: 2025-06-04

## 2025-06-04 RX ORDER — LEVOTHYROXINE SODIUM 300 MCG
1 TABLET ORAL
Qty: 0 | Refills: 0 | DISCHARGE
Start: 2025-06-04

## 2025-06-04 RX ORDER — ERTAPENEM SODIUM 1 G/1
1000 INJECTION, POWDER, LYOPHILIZED, FOR SOLUTION INTRAMUSCULAR; INTRAVENOUS EVERY 24 HOURS
Refills: 0 | Status: DISCONTINUED | OUTPATIENT
Start: 2025-06-05 | End: 2025-06-04

## 2025-06-04 RX ORDER — MYCOPHENOLIC ACID 360 MG/1
2 TABLET, DELAYED RELEASE ORAL
Refills: 0 | DISCHARGE

## 2025-06-04 RX ORDER — ERTAPENEM SODIUM 1 G/1
1000 INJECTION, POWDER, LYOPHILIZED, FOR SOLUTION INTRAMUSCULAR; INTRAVENOUS ONCE
Refills: 0 | Status: COMPLETED | OUTPATIENT
Start: 2025-06-04 | End: 2025-06-04

## 2025-06-04 RX ORDER — TACROLIMUS 0.5 MG/1
1 CAPSULE ORAL
Qty: 180 | Refills: 1
Start: 2025-06-04 | End: 2025-11-30

## 2025-06-04 RX ORDER — LISINOPRIL 5 MG/1
10 TABLET ORAL DAILY
Refills: 0 | Status: DISCONTINUED | OUTPATIENT
Start: 2025-06-04 | End: 2025-06-04

## 2025-06-04 RX ORDER — AMLODIPINE BESYLATE 10 MG/1
2.5 TABLET ORAL DAILY
Refills: 0 | Status: DISCONTINUED | OUTPATIENT
Start: 2025-06-04 | End: 2025-06-04

## 2025-06-04 RX ORDER — ACETAMINOPHEN 500 MG/5ML
650 LIQUID (ML) ORAL EVERY 6 HOURS
Refills: 0 | Status: DISCONTINUED | OUTPATIENT
Start: 2025-06-04 | End: 2025-06-04

## 2025-06-04 RX ORDER — SULFAMETHOXAZOLE/TRIMETHOPRIM 800-160 MG
1 TABLET ORAL
Qty: 18 | Refills: 0
Start: 2025-06-04 | End: 2025-06-12

## 2025-06-04 RX ADMIN — Medication 650 MILLIGRAM(S): at 10:26

## 2025-06-04 RX ADMIN — Medication 75 MICROGRAM(S): at 05:26

## 2025-06-04 RX ADMIN — INSULIN LISPRO 2: 100 INJECTION, SOLUTION INTRAVENOUS; SUBCUTANEOUS at 12:28

## 2025-06-04 RX ADMIN — Medication 1 TABLET(S): at 11:31

## 2025-06-04 RX ADMIN — ENOXAPARIN SODIUM 40 MILLIGRAM(S): 100 INJECTION SUBCUTANEOUS at 12:29

## 2025-06-04 RX ADMIN — Medication 1 APPLICATION(S): at 07:03

## 2025-06-04 RX ADMIN — Medication 25 GRAM(S): at 06:58

## 2025-06-04 RX ADMIN — PREDNISONE 5 MILLIGRAM(S): 20 TABLET ORAL at 06:58

## 2025-06-04 RX ADMIN — METOPROLOL SUCCINATE 50 MILLIGRAM(S): 50 TABLET, EXTENDED RELEASE ORAL at 06:58

## 2025-06-04 RX ADMIN — ERTAPENEM SODIUM 100 MILLIGRAM(S): 1 INJECTION, POWDER, LYOPHILIZED, FOR SOLUTION INTRAMUSCULAR; INTRAVENOUS at 11:31

## 2025-06-04 RX ADMIN — TACROLIMUS 1 MILLIGRAM(S): 0.5 CAPSULE ORAL at 09:44

## 2025-06-04 RX ADMIN — Medication 650 MILLIGRAM(S): at 10:56

## 2025-06-04 NOTE — PROGRESS NOTE ADULT - PROBLEM SELECTOR PLAN 7
DVT ppx: Lovenox 40mg daily   Diet: CC diet  Dispo: home, no skilled PT needs
DVT ppx: Lovenox 40mg daily   Diet: CC diet  Dispo: home, no skilled PT needs
DVT ppx: Lovenox 40mg daily   Diet: CC diet  Dispo: likely home, PT eval

## 2025-06-04 NOTE — DISCHARGE NOTE PROVIDER - PROVIDER TOKENS
PROVIDER:[TOKEN:[[id number]]] PROVIDER:[TOKEN:[[id number]]],PROVIDER:[TOKEN:[[id number]]] PROVIDER:[TOKEN:[[id number]]] PROVIDER:[TOKEN:[[id number]]],PROVIDER:[TOKEN:[[id number]]]

## 2025-06-04 NOTE — PROGRESS NOTE ADULT - PROBLEM SELECTOR PLAN 1
Urine culture: >100k GNR    -f/u speciation of GNR  -c/w empiric zosyn pending results  -Hold MMF  -ID following  -Blood cultures negative  -VA duplex of transplant kidney with good flow, no evidence of thrombus or stenosis. Wedge shaped opacification on CT imaging likely related to pyelonephritis

## 2025-06-04 NOTE — DISCHARGE NOTE PROVIDER - NSDCCPTREATMENT_GEN_ALL_CORE_FT
PRINCIPAL PROCEDURE  Procedure: Abdomen CT  Findings and Treatment: FINDINGS:  LOWER CHEST: Bibasilar subsegmental atelectasis.  LIVER: 1.8 cm left hepatic lobe cyst. Additional subcentimeter   hypodensities too small to characterize.  BILE DUCTS: Within normal limits.  GALLBLADDER: Within normal limits.  SPLEEN: Within normal limits.  PANCREAS: Within normal limits.  ADRENALS: Within normal limits.  KIDNEYS/URETERS: Atrophic native kidneys. Right lower quadrant renal   transplant with wedge shaped area of decreased enhancement in the lower   pole and mild surrounding perinephric fat stranding. 1.5 cm cyst within   the interpole region of the transplant kidney and additional   subcentimeter hypodense lesions too small for accurate characterization.   No hydronephrosis.  BLADDER: Within normal limits.  REPRODUCTIVE ORGANS: Redemonstrated right adnexal cyst measuring 8.3 x   7.0 cm. Uterus and left adnexa are within normal limits.  BOWEL: Small hiatal hernia. No bowel obstruction or inflammation. Colonic   diverticulosis without diverticulitis. Appendix is normal.  PERITONEUM/RETROPERITONEUM: Within normal limits.  VESSELS: Atherosclerotic changes. Transplant renal vasculature appears   patent.  LYMPH NODES: No lymphadenopathy.  ABDOMINAL WALL: Within normal limits.  BONES: Degenerative changes of the spine.  IMPRESSION:  1. No bowel obstruction or inflammation.  2. Wedge shaped area of decreased enhancement in the right lower quadrant   renal transplant which could be due to a pyelonephritis versus renal   infarct.        SECONDARY PROCEDURE  Procedure: US vascular duplex scan pelvis  Findings and Treatment: CONCLUSIONS:      1. The anastomotic site between the right external iliac artery to transplant renal artery appears patent, without evidence of a hemodynamically significant stenosis.   2. No evidence of a hemodynamically significant stenosis within the transplant renal artery.   3. Patent transplant renal vein, without evidence of thrombosis.   4. Normal resistive indices noted on parenchymal Doppler analyses.

## 2025-06-04 NOTE — DISCHARGE NOTE PROVIDER - NSDCFUSCHEDAPPT_GEN_ALL_CORE_FT
Roberto Carlos Philippe  [facility] Physician [facility]  GERIATRICS [address] [place] [address]   Scheduled Appointment: 07/02/2025

## 2025-06-04 NOTE — DISCHARGE NOTE PROVIDER - HOSPITAL COURSE
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 She was empirically started on Zosyn. Urine culture: ESBL Klebsiella, resistant to fluoroquinolones.     Important Medication Changes and Reason:  -Decrease tacrolimus to 1mg BID  -TMP/SMX 1 DS tab BID x 7-10 days    Active or Pending Issues Requiring Follow-up:  -f/u PCP/renal for creat check while on bactrim    Advanced Directives:   [ x] Full code  [ ] DNR  [ ] Hospice    Discharge Diagnoses:  #Pyelonephritis          83 y/o F with PMH of ESRD s/p renal transplant (2012), chronic UTI (on suppressive methamine), HTN, essential tremor, hypothyroid, DMT2, presenting with severe sepsis 2/2 UTI with pyelonephritis in transplanted kidney. She was empirically started on Zosyn. Urine culture: ESBL Klebsiella, resistant to fluoroquinolones.     Important Medication Changes and Reason:  -Decrease tacrolimus to 1mg BID  -MMF on hold until antibiotics completed and follow up with transplant nephrology (Dr. Urbina)  -TMP/SMX 1 DS tab BID for 6 more days    Active or Pending Issues Requiring Follow-up:  -f/u with Dr. Urbina for BMP twice weekly while on Bactrim. Will require BMP on Friday 6/6 and Monday 6/9 to be faxed to Dr. Urbina at [phone]    Advanced Directives:   [ x] Full code  [ ] DNR  [ ] Hospice    Discharge Diagnoses:  #Pyelonephritis          83 y/o F with PMH of ESRD s/p renal transplant (2012), chronic UTI (on suppressive methamine), HTN, essential tremor, hypothyroid, DMT2, presenting with severe sepsis 2/2 UTI with pyelonephritis in transplanted kidney. She was empirically started on Zosyn. Urine culture: ESBL Klebsiella, resistant to fluoroquinolones.     Important Medication Changes and Reason:  -Decrease tacrolimus to 1mg BID  -MMF on hold until antibiotics completed and follow up with transplant nephrology (Dr. Urbina)  -TMP/SMX 1 DS tab BID until 6/13    Active or Pending Issues Requiring Follow-up:  -f/u with Dr. Urbina for BMP twice weekly while on Bactrim. Will require BMP on Friday 6/6 and Monday 6/9, and Thursday 6/12 to be faxed to Dr. Urbina at [phone]    Advanced Directives:   [ x] Full code  [ ] DNR  [ ] Hospice    Discharge Diagnoses:  #Pyelonephritis          83 y/o F with PMH of ESRD s/p renal transplant (2012), chronic UTI (on suppressive methamine), HTN, essential tremor, hypothyroid, DMT2, presenting with severe sepsis 2/2 UTI with pyelonephritis in transplanted kidney. She was empirically started on Zosyn. Urine culture: ESBL Klebsiella, resistant to fluoroquinolones. Changed to ertapenem and being discharged on Bactrim to complete 10 total days of antibiotics.     Important Medication Changes and Reason:  -Decrease tacrolimus to 1mg BID  -MMF on hold until antibiotics completed and follow up with transplant nephrology (Dr. Urbina)  -TMP/SMX 1 DS tab BID until 6/13    Active or Pending Issues Requiring Follow-up:  -f/u with Dr. Urbina for BMP twice weekly while on Bactrim. Will require BMP on Friday 6/6 and Monday 6/9, and Thursday 6/12 to be faxed to Dr. Urbina at [phone]    Advanced Directives:   [ x] Full code  [ ] DNR  [ ] Hospice    Discharge Diagnoses:  #Pyelonephritis

## 2025-06-04 NOTE — PROGRESS NOTE ADULT - ASSESSMENT
85 yo woman kidney transplant 2011 on tacro, mycophenolate, prednisone    presented 6/1 with fever 103 lethargy and acute encephalopathy    pyuria and wedged shaped area right transplanted kidney     Pyelonephritis in patient with transplanted kidney     Blood cultures no growth     urine culture > 100K ESBL kleb    clinically improvied quickly on zosyn though organisms resistant   switched to ertapenem today       can switch to bactrim DS 1 tab po q 12 h --> 6/13  would check bmp 2 x week while on bactrim     
Pt. with kidney transplant, on immunosuppressives.
83 yo woman kidney transplant 2011 on tacro, mycophenolate, prednisone    presented 6/1 with fever 103 lethargy and acute encephalopathy    pyuria and wedged shaped area right transplanted kidney     Pyelonephritis in patient with transplanted kidney     Blood cultures no growth     urine culture > 100K gram negative rods     clinically improving       Would continue Zosyn   Follow final urine culture and sensitivities     
85 yo woman kidney transplant 2011 on tacro, mycophenolate, prednisone    presented 6/1 with fever 103 lethargy and acute encephalopathy    pyuria and wedged shaped area right transplanted kidney     Pyelonephritis in patient with transplanted kidney     Blood cultures no growth     urine culture > 100K gram negative rods     clinically improving       Would continue Zosyn   Follow final urine culture and sensitivities     
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

## 2025-06-04 NOTE — PROGRESS NOTE ADULT - SUBJECTIVE AND OBJECTIVE BOX
Follow Up:  pyelo    Interval History/ROS:  feels much better       daughter at bedside     Allergies  No Known Allergies        ANTIMICROBIALS:  piperacillin/tazobactam IVPB.. 3.375 every 8 hours    MEDICATIONS  (STANDING):  acetaminophen     Tablet .. 650 every 6 hours PRN  atorvastatin 10 at bedtime  enoxaparin Injectable 40 every 24 hours  insulin lispro (ADMELOG) corrective regimen sliding scale  three times a day before meals  insulin lispro (ADMELOG) corrective regimen sliding scale  at bedtime  levothyroxine 75 daily  predniSONE   Tablet 5 daily  tacrolimus 1 <User Schedule>      Vital Signs Last 24 Hrs  T(F): 98.1 (06-03-25 @ 12:55), Max: 98.6 (06-03-25 @ 01:30)  HR: 67 (06-03-25 @ 12:55)  BP: 142/62 (06-03-25 @ 12:55)  RR: 18 (06-03-25 @ 12:55)  SpO2: 100% (06-03-25 @ 12:55) (96% - 100%)      PHYSICAL EXAM:  Constitutional: No distress  Eyes: lid droop right   Oral cavity: Clear, no lesions  Neck: Supple  RS: no respiratory distress  CVS: S1, S2 heard.   Abdomen: Soft.   :  no cath  Skin: No lesions noted  Neuro: Alert, oriented to time/place/person  Cranial nerves 2-12 grossly normal. No focal abnormalities                                     11.2   8.16  )-----------( 235      ( 03 Jun 2025 05:08 )             34.5 06-03    137  |  102  |  14  ----------------------------<  140  4.2   |  24  |  0.94  Ca    8.9      03 Jun 2025 05:08Phos  2.3     06-03Mg     2.10     06-03  TPro  6.3  /  Alb  3.4  /  TBili  0.3  /  DBili  x   /  AST  19  /  ALT  12  /  AlkPhos  65  06-03      Urinalysis Basic - ( 02 Jun 2025 06:55 )    Color: x / Appearance: x / SG: x / pH: x  Gluc: 127 mg/dL / Ketone: x  / Bili: x / Urobili: x   Blood: x / Protein: x / Nitrite: x   Leuk Esterase: x / RBC: x / WBC x   Sq Epi: x / Non Sq Epi: x / Bacteria: x        MICROBIOLOGY:  v  Clean Catch  05-31-25   >100,000 CFU/ml Gram Negative Rods  --  --      Blood Blood-Peripheral  05-31-25   No growth at 48 hours  --  --      Blood Blood-Peripheral  05-31-25   No growth at 48 hours  --  --          CMVPCR Log: NotDetec [id number]/mL (05-31 @ 21:52)  Rapid RVP Result: NotDetec (05-31 @ 21:52)        RADIOLOGY:    rd< from: CT Abdomen and Pelvis w/ IV Cont (06.01.25 @ 01:31) >    ACC: [id number] EXAM:  CT ABDOMEN AND PELVIS IC   ORDERED BY: DELILAH PENDLETON     PROCEDURE DATE:  06/01/2025          INTERPRETATION:  CLINICAL INFORMATION: Fever and diarrhea.    COMPARISON: CT abdomen and pelvis 3/28/2025.    CONTRAST/COMPLICATIONS:  IV Contrast: Omnipaque 350  75 cc administered   25 cc discarded  Oral Contrast: NONE  .    PROCEDURE:  CT of the Abdomen and Pelvis was performed.  Sagittal and coronal reformats were performed.    FINDINGS:  LOWER CHEST: Bibasilar subsegmental atelectasis.    LIVER: 1.8 cm left hepatic lobe cyst. Additional subcentimeter   hypodensities too small to characterize.  BILE DUCTS: Within normal limits.  GALLBLADDER: Within normal limits.  SPLEEN: Within normal limits.  PANCREAS: Within normallimits.  ADRENALS: Within normal limits.  KIDNEYS/URETERS: Atrophic native kidneys. Right lower quadrant renal   transplant with wedge shaped area of decreased enhancement in the lower   pole and mild surrounding perinephric fat stranding. 1.5 cm cyst within   the interpole region of the transplant kidney and additional   subcentimeter hypodense lesions too small for accurate characterization.   No hydronephrosis.    BLADDER: Within normal limits.  REPRODUCTIVE ORGANS: Redemonstrated right adnexal cyst measuring 8.3 x   7.0 cm. Uterus and left adnexa are within normal limits.    BOWEL: Small hiatal hernia. No bowel obstruction or inflammation. Colonic   diverticulosis without diverticulitis. Appendix is normal.  PERITONEUM/RETROPERITONEUM: Within normal limits.  VESSELS: Atherosclerotic changes. Transplant renal vasculature appears   patent.  LYMPH NODES: No lymphadenopathy.  ABDOMINAL WALL: Within normal limits.  BONES: Degenerative changes of the spine.    IMPRESSION:  1. No bowel obstruction or inflammation.  2. Wedge shaped area of decreased enhancement in the right lower quadrant   renal transplant which could be due to a pyelonephritis versus renal   infarct.          --- End of Report ---          JOSEPH UNDERWOOD MD; Resident Radiologist  This document has been electronically signed.  AMPARO ROMERO MD; Attending Radiologist  This document has been electronically signed. Jun 1 2025  2:30AM    < end of copied text >  
  Follow Up:  pyelo    Interval History/ROS:  feels much better  anxious to go home     urine ESBL Kleb    Allergies  No Known Allergies        ANTIMICROBIALS:  ertapenem  IVPB        MEDICATIONS  (STANDING):  acetaminophen     Tablet .. 650 every 6 hours PRN  acetaminophen     Tablet .. 650 every 6 hours PRN  amLODIPine   Tablet 2.5 daily  atorvastatin 10 at bedtime  enoxaparin Injectable 40 every 24 hours  insulin lispro (ADMELOG) corrective regimen sliding scale  three times a day before meals  insulin lispro (ADMELOG) corrective regimen sliding scale  at bedtime  levothyroxine 75 daily  lisinopril 10 daily  metoprolol succinate ER 50 daily  predniSONE   Tablet 5 daily  tacrolimus 1 <User Schedule>      Vital Signs Last 24 Hrs  T(F): 97.3 (06-04-25 @ 04:22), Max: 97.7 (06-03-25 @ 21:32)  HR: 67 (06-04-25 @ 06:07)  BP: 151/65 (06-04-25 @ 06:07)  RR: 18 (06-04-25 @ 04:22)  SpO2: 95% (06-04-25 @ 04:22) (95% - 98%)      PHYSICAL EXAM:  Constitutional: No distress  Eyes: lid droop right   Oral cavity: Clear, no lesions  Neck: Supple  RS: no respiratory distress  CVS: S1, S2 heard.   Abdomen: Soft.   :  no cath  Skin: No lesions noted  Neuro: Alert, oriented to time/place/person  Cranial nerves 2-12 grossly normal. No focal abnormalities                                     11.2   8.16  )-----------( 235      ( 03 Jun 2025 05:08 )             34.5 06-04    140  |  104  |  16  ----------------------------<  128  4.3   |  24  |  0.86  Ca    9.0      04 Jun 2025 04:28Phos  3.1     06-04Mg     1.90     06-04  TPro  6.3  /  Alb  3.4  /  TBili  0.3  /  DBili  x   /  AST  19  /  ALT  12  /  AlkPhos  65  06-03    Urinalysis Basic - ( 02 Jun 2025 06:55 )    Color: x / Appearance: x / SG: x / pH: x  Gluc: 127 mg/dL / Ketone: x  / Bili: x / Urobili: x   Blood: x / Protein: x / Nitrite: x   Leuk Esterase: x / RBC: x / WBC x   Sq Epi: x / Non Sq Epi: x / Bacteria: x        MICROBIOLOGY:  v  Clean Catch  05-31-25   >100,000 CFU/ml Gram Negative Rods  --  --    Culture - Urine (05.31.25 @ 21:59)   - Ampicillin: R >16 These ampicillin results predict results for amoxicillin  - Ampicillin/Sulbactam: S 8/4  - Aztreonam: R >16  - Cefazolin: R >16 For uncomplicated UTI with K. pneumoniae, E. coli, or P. mirablis: EVRA <=16 is sensitive and VERA >=32 is resistant. This also predicts results for oral agents cefaclor, cefdinir, cefpodoxime, cefprozil, cefuroxime axetil, cephalexin and locarbef for uncomplicated UTI. Note that some isolates may be susceptible to these agents while testing resistant to cefazolin.  - Cefepime: R <=2  - Ceftriaxone: R 32  - Cefuroxime: R >16  - Ciprofloxacin: R 1  - Ertapenem: S <=0.5  - Gentamicin: S <=2  - Imipenem: S <=1  - Levofloxacin: I 1  - Meropenem: S <=1  - Nitrofurantoin: S <=32 Should not be used to treat pyelonephritis  - Piperacillin/Tazobactam: S <=8  - Tigecycline: S <=2 Interpretations based on FDA breakpoints  - Tobramycin: S <=2  - Trimethoprim/Sulfamethoxazole: S <=0.5/9.5  Specimen Source: Clean Catch  Culture Results:   >100,000 CFU/ml Klebsiella pneumoniae ESBL  Organism Identification: Klebsiella pneumoniae ESBL  Organism: Klebsiella pneumoniae ESBL  Method Type: VERA  Blood Blood-Peripheral  05-31-25   No growth --  --      Blood Blood-Peripheral  05-31-25   No growth  --  --          CMVPCR Log: NotDetec [id number]/mL (05-31 @ 21:52)  Rapid RVP Result: NotDetec (05-31 @ 21:52)        RADIOLOGY:    rd< from: CT Abdomen and Pelvis w/ IV Cont (06.01.25 @ 01:31) >    ACC: [id number] EXAM:  CT ABDOMEN AND PELVIS IC   ORDERED BY: DELILAH PENDLETON     PROCEDURE DATE:  06/01/2025          INTERPRETATION:  CLINICAL INFORMATION: Fever and diarrhea.    COMPARISON: CT abdomen and pelvis 3/28/2025.    CONTRAST/COMPLICATIONS:  IV Contrast: Omnipaque 350  75 cc administered   25 cc discarded  Oral Contrast: NONE  .    PROCEDURE:  CT of the Abdomen and Pelvis was performed.  Sagittal and coronal reformats were performed.    FINDINGS:  LOWER CHEST: Bibasilar subsegmental atelectasis.    LIVER: 1.8 cm left hepatic lobe cyst. Additional subcentimeter   hypodensities too small to characterize.  BILE DUCTS: Within normal limits.  GALLBLADDER: Within normal limits.  SPLEEN: Within normal limits.  PANCREAS: Within normallimits.  ADRENALS: Within normal limits.  KIDNEYS/URETERS: Atrophic native kidneys. Right lower quadrant renal   transplant with wedge shaped area of decreased enhancement in the lower   pole and mild surrounding perinephric fat stranding. 1.5 cm cyst within   the interpole region of the transplant kidney and additional   subcentimeter hypodense lesions too small for accurate characterization.   No hydronephrosis.    BLADDER: Within normal limits.  REPRODUCTIVE ORGANS: Redemonstrated right adnexal cyst measuring 8.3 x   7.0 cm. Uterus and left adnexa are within normal limits.    BOWEL: Small hiatal hernia. No bowel obstruction or inflammation. Colonic   diverticulosis without diverticulitis. Appendix is normal.  PERITONEUM/RETROPERITONEUM: Within normal limits.  VESSELS: Atherosclerotic changes. Transplant renal vasculature appears   patent.  LYMPH NODES: No lymphadenopathy.  ABDOMINAL WALL: Within normal limits.  BONES: Degenerative changes of the spine.    IMPRESSION:  1. No bowel obstruction or inflammation.  2. Wedge shaped area of decreased enhancement in the right lower quadrant   renal transplant which could be due to a pyelonephritis versus renal   infarct.          --- End of Report ---          JOSEPH UNDERWOOD MD; Resident Radiologist  This document has been electronically signed.  AMPARO ROMERO MD; Attending Radiologist  This document has been electronically signed. Jun 1 2025  2:30AM    < end of copied text >  
  Follow Up:  pyelo    Interval History/ROS:  feels much better   now conversant     daughter at bedside     Allergies  No Known Allergies        ANTIMICROBIALS:  piperacillin/tazobactam IVPB.. 3.375 every 8 hours      OTHER MEDS:  acetaminophen     Tablet .. 650 milliGRAM(s) Oral every 6 hours PRN  atorvastatin 10 milliGRAM(s) Oral at bedtime  chlorhexidine 2% Cloths 1 Application(s) Topical <User Schedule>  enoxaparin Injectable 40 milliGRAM(s) SubCutaneous every 24 hours  insulin lispro (ADMELOG) corrective regimen sliding scale   SubCutaneous three times a day before meals  insulin lispro (ADMELOG) corrective regimen sliding scale   SubCutaneous at bedtime  levothyroxine 75 MICROGram(s) Oral daily  multivitamin 1 Tablet(s) Oral daily  predniSONE   Tablet 5 milliGRAM(s) Oral daily  tacrolimus 1.5 milliGRAM(s) Oral <User Schedule>      Vital Signs Last 24 Hrs  T(C): 36.4 (02 Jun 2025 13:45), Max: 36.9 (02 Jun 2025 01:26)  T(F): 97.6 (02 Jun 2025 13:45), Max: 98.4 (02 Jun 2025 01:26)  HR: 71 (02 Jun 2025 13:45) (63 - 76)  BP: 118/56 (02 Jun 2025 13:45) (104/59 - 140/60)  BP(mean): --  RR: 17 (02 Jun 2025 13:45) (16 - 17)  SpO2: 100% (02 Jun 2025 13:45) (96% - 100%)    Parameters below as of 02 Jun 2025 13:45  Patient On (Oxygen Delivery Method): room air        PHYSICAL EXAM:  Constitutional: Not in acute distress  Eyes: lid droop right   Oral cavity: Clear, no lesions  Neck: Supple  RS: no respiratory distress  CVS: S1, S2 heard.   Abdomen: Soft.   :  no cath  Skin: No lesions noted  Vascular: No evidence of phlebitis  Neuro: Alert, oriented to time/place/person  Cranial nerves 2-12 grossly normal. No focal abnormalities                          11.1   11.15 )-----------( 201      ( 02 Jun 2025 06:55 )             34.6       06-02    129[L]  |  93[L]  |  12  ----------------------------<  127[H]  3.5   |  23  |  0.81    Ca    8.6      02 Jun 2025 06:55  Phos  2.3     06-02  Mg     1.50     06-02    TPro  6.1  /  Alb  3.4  /  TBili  0.5  /  DBili  x   /  AST  17  /  ALT  8   /  AlkPhos  59  06-02      Urinalysis Basic - ( 02 Jun 2025 06:55 )    Color: x / Appearance: x / SG: x / pH: x  Gluc: 127 mg/dL / Ketone: x  / Bili: x / Urobili: x   Blood: x / Protein: x / Nitrite: x   Leuk Esterase: x / RBC: x / WBC x   Sq Epi: x / Non Sq Epi: x / Bacteria: x        MICROBIOLOGY:  v  Clean Catch  05-31-25   >100,000 CFU/ml Gram Negative Rods  --  --      Blood Blood-Peripheral  05-31-25   No growth at 24 hours  --  --      Blood Blood-Peripheral  05-31-25   No growth at 24 hours  --  --          CMVPCR Log: NotDetec Cnd84SP/mL (05-31 @ 21:52)  Rapid RVP Result: NotDetec (05-31 @ 21:52)        RADIOLOGY:    rd< from: CT Abdomen and Pelvis w/ IV Cont (06.01.25 @ 01:31) >    ACC: [id number] EXAM:  CT ABDOMEN AND PELVIS IC   ORDERED BY: DELILAH PENDLETON     PROCEDURE DATE:  06/01/2025          INTERPRETATION:  CLINICAL INFORMATION: Fever and diarrhea.    COMPARISON: CT abdomen and pelvis 3/28/2025.    CONTRAST/COMPLICATIONS:  IV Contrast: Omnipaque 350  75 cc administered   25 cc discarded  Oral Contrast: NONE  .    PROCEDURE:  CT of the Abdomen and Pelvis was performed.  Sagittal and coronal reformats were performed.    FINDINGS:  LOWER CHEST: Bibasilar subsegmental atelectasis.    LIVER: 1.8 cm left hepatic lobe cyst. Additional subcentimeter   hypodensities too small to characterize.  BILE DUCTS: Within normal limits.  GALLBLADDER: Within normal limits.  SPLEEN: Within normal limits.  PANCREAS: Within normallimits.  ADRENALS: Within normal limits.  KIDNEYS/URETERS: Atrophic native kidneys. Right lower quadrant renal   transplant with wedge shaped area of decreased enhancement in the lower   pole and mild surrounding perinephric fat stranding. 1.5 cm cyst within   the interpole region of the transplant kidney and additional   subcentimeter hypodense lesions too small for accurate characterization.   No hydronephrosis.    BLADDER: Within normal limits.  REPRODUCTIVE ORGANS: Redemonstrated right adnexal cyst measuring 8.3 x   7.0 cm. Uterus and left adnexa are within normal limits.    BOWEL: Small hiatal hernia. No bowel obstruction or inflammation. Colonic   diverticulosis without diverticulitis. Appendix is normal.  PERITONEUM/RETROPERITONEUM: Within normal limits.  VESSELS: Atherosclerotic changes. Transplant renal vasculature appears   patent.  LYMPH NODES: No lymphadenopathy.  ABDOMINAL WALL: Within normal limits.  BONES: Degenerative changes of the spine.    IMPRESSION:  1. No bowel obstruction or inflammation.  2. Wedge shaped area of decreased enhancement in the right lower quadrant   renal transplant which could be due to a pyelonephritis versus renal   infarct.          --- End of Report ---          JOSEPH UNDERWOOD MD; Resident Radiologist  This document has been electronically signed.  AMPARO ROMERO MD; Attending Radiologist  This document has been electronically signed. Jun 1 2025  2:30AM    < end of copied text >  
[facility] DIVISION OF KIDNEY DISEASES AND HYPERTENSION --    Chief Complaints: Kidney transplant, on immunosuppressives    24 hour events/subjective: Pt. seen and examined during HD today. Pt. feels better, denies fever, CP, SOB, HA or dizziness during HD rounds.    PAST HISTORY  --------------------------------------------------------------------------------  No significant changes to PMH, PSH, FHx, SHx, unless otherwise noted    ALLERGIES & MEDICATIONS  --------------------------------------------------------------------------------  Allergies    No Known Allergies    Intolerances    Standing Inpatient Medications  atorvastatin 10 milliGRAM(s) Oral at bedtime  chlorhexidine 2% Cloths 1 Application(s) Topical <User Schedule>  enoxaparin Injectable 40 milliGRAM(s) SubCutaneous every 24 hours  insulin lispro (ADMELOG) corrective regimen sliding scale   SubCutaneous three times a day before meals  insulin lispro (ADMELOG) corrective regimen sliding scale   SubCutaneous at bedtime  levothyroxine 75 MICROGram(s) Oral daily  multivitamin 1 Tablet(s) Oral daily  piperacillin/tazobactam IVPB.. 3.375 Gram(s) IV Intermittent every 8 hours  predniSONE   Tablet 5 milliGRAM(s) Oral daily  tacrolimus 1.5 milliGRAM(s) Oral <User Schedule>    PRN Inpatient Medications  acetaminophen     Tablet .. 650 milliGRAM(s) Oral every 6 hours PRN    REVIEW OF SYSTEMS  --------------------------------------------------------------------------------  Gen: No fever  Respiratory: No dyspnea  CV: No chest pain  GI: No abdominal pain  MSK: No edema  Neuro: No dizziness    All other systems were reviewed and are negative, except as noted.    VITALS/PHYSICAL EXAM  --------------------------------------------------------------------------------  T(C): 36.5 (06-03-25 @ 05:17), Max: 37 (06-03-25 @ 01:30)  HR: 59 (06-03-25 @ 05:17) (59 - 71)  BP: 131/52 (06-03-25 @ 05:17) (118/56 - 139/69)  RR: 18 (06-03-25 @ 05:17) (17 - 18)  SpO2: 96% (06-03-25 @ 05:17) (96% - 100%)  Wt(kg): --    06-02-25 @ 07:01  -  06-03-25 @ 07:00  --------------------------------------------------------  IN: 0 mL / OUT: 1000 mL / NET: -1000 mL    Physical Exam:  	Gen: resting, well-appearing  	HEENT: No JVD  	Pulm: CTA B/L  	CV: S1S2+  	Back: No CVA tenderness  	Abd: Soft, nontender/nondistended  	LE: No edema  	Skin: Warm    LABS/STUDIES  --------------------------------------------------------------------------------              11.2   8.16  >-----------<  235      [06-03-25 @ 05:08]              34.5     137  |  102  |  14  ----------------------------<  140      [06-03-25 @ 05:08]  4.2   |  24  |  0.94        Ca     8.9     [06-03-25 @ 05:08]      Mg     2.10     [06-03-25 @ 05:08]      Phos  2.3     [06-03-25 @ 05:08]    TPro  6.3  /  Alb  3.4  /  TBili  0.3  /  DBili  x   /  AST  19  /  ALT  12  /  AlkPhos  65  [06-03-25 @ 05:08]    Creatinine Trend:  SCr 0.94 [06-03 @ 05:08]  SCr 0.81 [06-02 @ 06:55]  SCr 0.81 [06-01 @ 12:23]  SCr 0.76 [05-31 @ 21:52]
Medicine Progress Note  --------------------  Kandice Nolan M.D.  Internal Medicine  PGY-1  --------------------      Patient: KRYSTLE WHATLEY, MRN: [id number], : 1940  Admitted on 25 for Urinary tract infection      LANGUAGE - English            ------------------------------------------------------------------------------------------------------------  SUMMARY (from last progress note through 25 @ 07:43): Admitted yesterday with severe sepsis. Hemodynamically improved with volume resuscitation Awaiting cultures  ------------------------------------------------------------------------------------------------------------  OVERNIGHT EVENTS  NAEON    SUBJECTIVE  -   ROS negative unless noted above.  ------------------------------------------------------------------------------------------------------------  OBJECTIVE:  Physical Exam  CONST:     NAD, well-appearing; well-developed; appears stated age  EYES:         Conjunctiva clear; PERRL; no conjunctival pallor; no lid lag  ENMT:       MMM, no pharyngeal injection or exudates; normal dentition/dentures present  NECK:        Supple, no LAD; no palpable masses; no thyromegaly  RESP :        Normal respiratory effort; CTA bilaterally; no W/R/R  CHEST:       No TTP, no lines/ports; symmetric chest expansion  CARDIO:     RRR, normal S1 and S2, no M/R/G; apical impulse at MCL  VASC:         No JVD/AJR, no peripheral edema, pulses 2+ B/L, Cap refill <2s  ABD:           Soft, NT/ND, norm bowel sounds; no R/G; No HSM; No CVAT  MSK:          No clubbing of digits; no joint swelling or TTP  EXT:           WWP, no reduction in body hair, no LE skin changes  PSYCH        A&O to person, place, and time; affect appropriate  NEURO:     Non-focal; no gross sensory deficits; moving all extremities   SKIN:          No rashes; no palpable lesions; axillae not dry    Vital Signs Last 24 Hrs  T(F): 98.1, Max: 103 (25 @ 13:51)  HR: 76 (63 - 94)  BP: 140/60 (97/41 - 148/82)  RR: 17 (16 - 17)  SpO2: 98% (96% - 98%)        DAILY MEASUREMENTS:  I&O's Summary    Daily Height in cm: 162.56 (2025 08:24)    Daily   Weight (kg): 54.1 (25 @ 08:24)  Orthostatic VS        LABS:                        13.3   16.59 )-----------( 243      ( 2025 12:23 )             41.1     Hgb Trend: 13.3<--, 14.2<--  06    132[L]  |  97[L]  |  14  ----------------------------<  134[H]  4.4   |  19[L]  |  0.81    Ca    9.3      2025 12:23  Phos  2.6     06-  Mg     1.40     -    TPro  7.0  /  Alb  3.9  /  TBili  1.1  /  DBili  x   /  AST  19  /  ALT  11  /  AlkPhos  77      PT/INR - ( 31 May 2025 21:52 )   PT: 12.9 sec;   INR: 1.08 ratio         PTT - ( 31 May 2025 21:52 )  PTT:32.2 sec  CAPILLARY BLOOD GLUCOSE      POCT Blood Glucose.: 147 mg/dL (2025 21:55)  POCT Blood Glucose.: 215 mg/dL (2025 17:21)  POCT Blood Glucose.: 132 mg/dL (2025 12:54)  POCT Blood Glucose.: 129 mg/dL (2025 08:25)        Urinalysis Basic - ( 2025 12:23 )    Color: x / Appearance: x / SG: x / pH: x  Gluc: 134 mg/dL / Ketone: x  / Bili: x / Urobili: x   Blood: x / Protein: x / Nitrite: x   Leuk Esterase: x / RBC: x / WBC x   Sq Epi: x / Non Sq Epi: x / Bacteria: x        Urinalysis with Rflx Culture (collected 31 May 2025 21:59)      RADIOLOGY & ADDITIONAL TESTS:  CT A/P: < from: CT Abdomen and Pelvis w/ IV Cont (25 @ 01:31) >    FINDINGS:  LOWER CHEST: Bibasilar subsegmental atelectasis.    LIVER: 1.8 cm left hepatic lobe cyst. Additional subcentimeter   hypodensities too small to characterize.  BILE DUCTS: Within normal limits.  GALLBLADDER: Within normal limits.  SPLEEN: Within normal limits.  PANCREAS: Within normallimits.  ADRENALS: Within normal limits.  KIDNEYS/URETERS: Atrophic native kidneys. Right lower quadrant renal   transplant with wedge shaped area of decreased enhancement in the lower   pole and mild surrounding perinephric fat stranding. 1.5 cm cyst within   the interpole region of the transplant kidney and additional   subcentimeter hypodense lesions too small for accurate characterization.   No hydronephrosis.    BLADDER: Within normal limits.  REPRODUCTIVE ORGANS: Redemonstrated right adnexal cyst measuring 8.3 x   7.0 cm. Uterus and left adnexa are within normal limits.    BOWEL: Small hiatal hernia. No bowel obstruction or inflammation. Colonic   diverticulosis without diverticulitis. Appendix is normal.  PERITONEUM/RETROPERITONEUM: Within normal limits.  VESSELS: Atherosclerotic changes. Transplant renal vasculature appears   patent.  LYMPH NODES: No lymphadenopathy.  ABDOMINAL WALL: Within normal limits.  BONES: Degenerative changes of the spine.    IMPRESSION:  1. No bowel obstruction or inflammation.  2. Wedge shaped area of decreased enhancement in the right lower quadrant   renal transplant which could be due to a pyelonephritis versus renal   infarct.    < end of copied text >        ------------------------------------------------------------------------------------------------------------  MEDICATIONS  (STANDING):  atorvastatin 10 milliGRAM(s) Oral at bedtime  chlorhexidine 2% Cloths 1 Application(s) Topical <User Schedule>  enoxaparin Injectable 40 milliGRAM(s) SubCutaneous every 24 hours  insulin lispro (ADMELOG) corrective regimen sliding scale   SubCutaneous three times a day before meals  insulin lispro (ADMELOG) corrective regimen sliding scale   SubCutaneous at bedtime  lactated ringers. 1000 milliLiter(s) (75 mL/Hr) IV Continuous <Continuous>  levothyroxine 75 MICROGram(s) Oral daily  multivitamin 1 Tablet(s) Oral daily  piperacillin/tazobactam IVPB.. 3.375 Gram(s) IV Intermittent every 8 hours  predniSONE   Tablet 5 milliGRAM(s) Oral daily  tacrolimus 1.5 milliGRAM(s) Oral <User Schedule>    MEDICATIONS  (PRN):  acetaminophen     Tablet .. 650 milliGRAM(s) Oral every 6 hours PRN Temp greater or equal to 38C (100.4F)    ------------------------------------------------------------------------------------------------------------  COORDINATION OF CARE:  Care discussed with consultants/other providers and notes reviewed [Y]    
Medicine Progress Note  --------------------  Kandice Nolan M.D.  Internal Medicine  PGY-1  --------------------      Patient: KRYSTLE WHATLEY, MRN: [id number], : 1940  Admitted on 25 for Urinary tract infection      LANGUAGE - English            ------------------------------------------------------------------------------------------------------------  SUMMARY (from last progress note through 25 @ 07:45):   ------------------------------------------------------------------------------------------------------------  OVERNIGHT EVENTS  NAEON    SUBJECTIVE  -       ROS negative unless noted above.  ------------------------------------------------------------------------------------------------------------  OBJECTIVE:  Physical Exam  CONST:     NAD, well-appearing; well-developed; appears stated age  EYES:         Conjunctiva clear; PERRL; no conjunctival pallor; no lid lag  ENMT:       MMM, no pharyngeal injection or exudates; normal dentition/dentures present  NECK:        Supple, no LAD; no palpable masses; no thyromegaly  RESP :        Normal respiratory effort; CTA bilaterally; no W/R/R  CHEST:       No TTP, no lines/ports; symmetric chest expansion  CARDIO:     RRR, normal S1 and S2, no M/R/G; apical impulse at MCL  VASC:         No JVD/AJR, no peripheral edema, pulses 2+ B/L, Cap refill <2s  ABD:           Soft, NT/ND, norm bowel sounds; no R/G; No HSM; No CVAT  MSK:          No clubbing of digits; no joint swelling or TTP  EXT:           WWP, no reduction in body hair, no LE skin changes  PSYCH        A&O to person, place, and time; affect appropriate  NEURO:     Non-focal; no gross sensory deficits; moving all extremities   SKIN:          No rashes; no palpable lesions; axillae not dry    Vital Signs Last 24 Hrs  T(F): 97.7, Max: 98.6 (25 @ 01:30)  HR: 59 (59 - 71)  BP: 131/52 (104/59 - 139/69)  RR: 18 (17 - 18)  SpO2: 96% (96% - 100%)        DAILY MEASUREMENTS:  I&O's Summary    2025 07:01  -  2025 07:00  --------------------------------------------------------  IN: 0 mL / OUT: 1000 mL / NET: -1000 mL      Daily     Daily   Weight (kg): 54.1 (25 @ 08:24)  Orthostatic VS        LABS:                        11.2   8.16  )-----------( 235      ( 2025 05:08 )             34.5     Hgb Trend: 11.2<--, 11.1<--, 13.3<--, 14.2<--  0603    x   |  x   |  14  ----------------------------<  140[H]  x    |  24  |  0.94    Ca    8.9      2025 05:08  Phos  2.3     06-03  Mg     2.10     06-03    TPro  6.3  /  Alb  3.4  /  TBili  0.3  /  DBili  x   /  AST  19  /  ALT  12  /  AlkPhos  65  06-03      CAPILLARY BLOOD GLUCOSE      POCT Blood Glucose.: 144 mg/dL (2025 21:35)  POCT Blood Glucose.: 196 mg/dL (2025 17:25)  POCT Blood Glucose.: 211 mg/dL (2025 12:02)  POCT Blood Glucose.: 180 mg/dL (2025 08:44)        Urinalysis Basic - ( 2025 05:08 )    Color: x / Appearance: x / SG: x / pH: x  Gluc: 140 mg/dL / Ketone: x  / Bili: x / Urobili: x   Blood: x / Protein: x / Nitrite: x   Leuk Esterase: x / RBC: x / WBC x   Sq Epi: x / Non Sq Epi: x / Bacteria: x        Urinalysis with Rflx Culture (collected 31 May 2025 21:59)    Culture - Urine (collected 31 May 2025 21:59)  Source: Clean Catch  Preliminary Report (2025 15:37):    >100,000 CFU/ml Gram Negative Rods    Culture - Blood (collected 31 May 2025 21:56)  Source: Blood Blood-Peripheral  Preliminary Report (2025 10:01):    No growth at 24 hours    Culture - Blood (collected 31 May 2025 21:52)  Source: Blood Blood-Peripheral  Preliminary Report (2025 10:01):    No growth at 24 hours        ------------------------------------------------------------------------------------------------------------  MEDICATIONS  (STANDING):  atorvastatin 10 milliGRAM(s) Oral at bedtime  chlorhexidine 2% Cloths 1 Application(s) Topical <User Schedule>  enoxaparin Injectable 40 milliGRAM(s) SubCutaneous every 24 hours  insulin lispro (ADMELOG) corrective regimen sliding scale   SubCutaneous three times a day before meals  insulin lispro (ADMELOG) corrective regimen sliding scale   SubCutaneous at bedtime  levothyroxine 75 MICROGram(s) Oral daily  multivitamin 1 Tablet(s) Oral daily  piperacillin/tazobactam IVPB.. 3.375 Gram(s) IV Intermittent every 8 hours  predniSONE   Tablet 5 milliGRAM(s) Oral daily  tacrolimus 1.5 milliGRAM(s) Oral <User Schedule>    MEDICATIONS  (PRN):  acetaminophen     Tablet .. 650 milliGRAM(s) Oral every 6 hours PRN Temp greater or equal to 38C (100.4F)    ------------------------------------------------------------------------------------------------------------  COORDINATION OF CARE:  Care discussed with consultants/other providers and notes reviewed [Y]    
Medicine Progress Note  --------------------  Kandice Nolan M.D.  Internal Medicine  PGY-1  --------------------      Patient: KRYSTLE WHATLEY, MRN: [id number], : 1940  Admitted on 25 for Urinary tract infection      LANGUAGE - English            ------------------------------------------------------------------------------------------------------------  SUMMARY (from last progress note through 25 @ 07:25): Restarted antihypertensives  ------------------------------------------------------------------------------------------------------------  OVERNIGHT EVENTS  NAEON    SUBJECTIVE  -     ROS negative unless noted above.  ------------------------------------------------------------------------------------------------------------  OBJECTIVE:  Physical Exam  CONST:     NAD, well-appearing; well-developed; appears stated age  EYES:         Conjunctiva clear; PERRL; no conjunctival pallor; no lid lag  ENMT:       MMM, no pharyngeal injection or exudates; normal dentition/dentures present  NECK:        Supple, no LAD; no palpable masses; no thyromegaly  RESP :        Normal respiratory effort; CTA bilaterally; no W/R/R  CHEST:       No TTP, no lines/ports; symmetric chest expansion  CARDIO:     RRR, normal S1 and S2, no M/R/G; apical impulse at MCL  VASC:         No JVD/AJR, no peripheral edema, pulses 2+ B/L, Cap refill <2s  ABD:           Soft, NT/ND, norm bowel sounds; no R/G; No HSM; No CVAT  MSK:          No clubbing of digits; no joint swelling or TTP  EXT:           WWP, no reduction in body hair, no LE skin changes  PSYCH        A&O to person, place, and time; affect appropriate  NEURO:     Non-focal; no gross sensory deficits; moving all extremities   SKIN:          No rashes; no palpable lesions; axillae not dry    Vital Signs Last 24 Hrs  T(F): 97.3, Max: 98.1 (25 @ 12:55)  HR: 63 (63 - 67)  BP: 144/68 (133/47 - 149/51)  RR: 18 (17 - 18)  SpO2: 95% (95% - 100%)        DAILY MEASUREMENTS:  I&O's Summary    2025 07:01  -  2025 07:00  --------------------------------------------------------  IN: 550 mL / OUT: 970 mL / NET: -420 mL      Daily     Daily   Weight (kg): 54.1 (25 @ 08:24)  Orthostatic VS        LABS:                        11.2   8.16  )-----------( 235      ( 2025 05:08 )             34.5     Hgb Trend: 11.2<--, 11.1<--, 13.3<--, 14.2<--      137  |  102  |  14  ----------------------------<  140[H]  4.2   |  24  |  0.94    Ca    8.9      2025 05:08  Phos  2.3     06-03  Mg     2.10     06-03    TPro  6.3  /  Alb  3.4  /  TBili  0.3  /  DBili  x   /  AST  19  /  ALT  12  /  AlkPhos  65  06-03      CAPILLARY BLOOD GLUCOSE      POCT Blood Glucose.: 154 mg/dL (2025 21:59)  POCT Blood Glucose.: 243 mg/dL (2025 17:16)  POCT Blood Glucose.: 169 mg/dL (2025 12:00)  POCT Blood Glucose.: 152 mg/dL (2025 08:18)        Urinalysis Basic - ( 2025 05:08 )    Color: x / Appearance: x / SG: x / pH: x  Gluc: 140 mg/dL / Ketone: x  / Bili: x / Urobili: x   Blood: x / Protein: x / Nitrite: x   Leuk Esterase: x / RBC: x / WBC x   Sq Epi: x / Non Sq Epi: x / Bacteria: x    ------------------------------------------------------------------------------------------------------------  MEDICATIONS  (STANDING):  amLODIPine   Tablet 2.5 milliGRAM(s) Oral daily  atorvastatin 10 milliGRAM(s) Oral at bedtime  chlorhexidine 2% Cloths 1 Application(s) Topical <User Schedule>  enoxaparin Injectable 40 milliGRAM(s) SubCutaneous every 24 hours  insulin lispro (ADMELOG) corrective regimen sliding scale   SubCutaneous three times a day before meals  insulin lispro (ADMELOG) corrective regimen sliding scale   SubCutaneous at bedtime  levothyroxine 75 MICROGram(s) Oral daily  lisinopril 10 milliGRAM(s) Oral daily  metoprolol succinate ER 50 milliGRAM(s) Oral daily  multivitamin 1 Tablet(s) Oral daily  piperacillin/tazobactam IVPB.. 3.375 Gram(s) IV Intermittent every 8 hours  predniSONE   Tablet 5 milliGRAM(s) Oral daily  tacrolimus 1 milliGRAM(s) Oral <User Schedule>    MEDICATIONS  (PRN):  acetaminophen     Tablet .. 650 milliGRAM(s) Oral every 6 hours PRN Temp greater or equal to 38C (100.4F)    ------------------------------------------------------------------------------------------------------------  COORDINATION OF CARE:  Care discussed with consultants/other providers and notes reviewed [Y]

## 2025-06-04 NOTE — PROGRESS NOTE ADULT - ATTENDING COMMENTS
84F with hx of ESRD s/p renal transplant (2012), frequent UTI (on suppressive methenamine), HTN, benign tremors, hypothyroid, DMT2, presenting with AMS found to have severe sepsis 2/2 pyelonephritis in transplanted kidney. CT abd with "Wedge shaped area of decreased enhancement in the right lower quadrant renal transplant which could be due to a pyelonephritis versus renal infarct."    #Severe sepsis  #Pyelonephritis  #Encephalopathy   #Possible renal infarct  #Transplanted kidney    - Encephalopathy improving with treatment of sepsis, but still slightly confused on exam today   - Febrile, leukocytosis, meeting sepsis criteria on admission. Suspect pyelo as source, UA+  - Continue with Zosyn, awaiting blood and urine cx, ID following   - Transplant nephro following, recs appreciated   - f/u vascular renal US  - c/w tacrolimus 1.5mg q12h 9a/9p. Check tacro level  - hold MMF  - c/w prednisone 5 mg qd    rest of plan as above discussed with resident
84F with hx of ESRD s/p renal transplant (2012), frequent UTI (on suppressive methenamine), HTN, benign tremors, hypothyroid, DMT2, presenting with AMS found to have severe sepsis 2/2 pyelonephritis in transplanted kidney. CT abd with "Wedge shaped area of decreased enhancement in the right lower quadrant renal transplant which could be due to a pyelonephritis versus renal infarct."    #Severe sepsis  #Pyelonephritis  #Encephalopathy   #Possible renal infarct  #Transplanted kidney  #ESBL Klebsiella     Patient seen and examined, she has no complaints and wants to go home. Discussed urine culture with resistant bacteria and awaiting further ID recs.     - Encephalopathy improving with treatment of sepsis  - Febrile, leukocytosis, meeting sepsis criteria on admission. Suspect pyelo as source, UA+  - Initially on Zosyn, ID following. Bcx NGTD. Ucx now with ESBL Klebsiella, will switch antibiotics to Ertapenem for now and await further ID recs for dispo regimen, possibly Bactrim?   - Transplant nephro following, recs appreciated   - Vascular renal US without evidence of renal infarct  - Dose decreased tacrolimus 1mg q12h 9a/9p due to high tacro levels. Check tacro level  - hold MMF  - c/w prednisone 5 mg qd    DC planning for home w/ home care possibly on oral antibiotics today per ID
84F with hx of ESRD s/p renal transplant (2012), frequent UTI (on suppressive methenamine), HTN, benign tremors, hypothyroid, DMT2, presenting with AMS found to have severe sepsis 2/2 pyelonephritis in transplanted kidney. CT abd with "Wedge shaped area of decreased enhancement in the right lower quadrant renal transplant which could be due to a pyelonephritis versus renal infarct."    #Severe sepsis  #Pyelonephritis  #Encephalopathy   #Possible renal infarct  #Transplanted kidney    Patient seen and examined, she has no complaints. Denies pain on urination. Explained she likely has urinary infection that spread to kidney     - Encephalopathy improving with treatment of sepsis  - Febrile, leukocytosis, meeting sepsis criteria on admission. Suspect pyelo as source, UA+  - Continue with Zosyn, ID following. Bcx NGTD. Ucx w/ GNR, awaiting speciation and sensitives.   - Transplant nephro following, recs appreciated   - Vascular renal US without evidence of renal infarct  - c/w tacrolimus 1.5mg q12h 9a/9p. Check tacro level  - hold MMF  - c/w prednisone 5 mg qd    Dc planning home on PO antibiotics pending urine cultures, anticipate in 1-2 days

## 2025-06-04 NOTE — PROGRESS NOTE ADULT - PROBLEM SELECTOR PLAN 3
History of renal transplant in 2012    -Follows with Dr. Musa Urbina ([facility])  -Hold MMF with severe sepsis  -c/w prednisone 5mg daily  -c/w tacrolimus 1.5mg BID, check level 1hr prior to AM dose daily

## 2025-06-04 NOTE — DISCHARGE NOTE PROVIDER - NSDCCPCAREPLAN_GEN_ALL_CORE_FT
PRINCIPAL DISCHARGE DIAGNOSIS  Diagnosis: Pyelonephritis  Assessment and Plan of Treatment: You came to the hospital because you had an infection in your transplanted kidney (called pyelonephritis). This is a type of kidney infection that needed strong medicine (antibiotics) given through your vein to get better. You have responded well to the treatment, and the infection is getting better.  How You Are Doing Now  You are doing well enough to go home. You will continue taking antibiotics by mouth to finish clearing up the infection. We will continue to watch you closely in the clinic after you go home.  Your Medications  It is VERY IMPORTANT to take ALL your medications exactly as your doctor told you.  Transplant Medications: These medicines (like [list specific names if known, e.g., Tacrolimus, Mycophenolate, Prednisone]) are critical to help stop your body from rejecting your new kidney.  Take these medications on time, every single day, without missing a dose.  Follow the instructions exactly (e.g., with or without food, specific time of day).  If you miss a dose: Call your transplant team right away to ask what you should do. Do NOT just take an extra dose without asking.  Antibiotics: You will be going home with a prescription for antibiotics (like [list specific name if known, e.g., Ciprofloxacin]).  These are to finish treating your kidney infection.  Take them exactly as prescribed until they are ALL GONE, even if you feel completely better sooner. Stopping early can make the infection come back.  Other Medications: Continue to take your other regular medications for blood pressure, etc., as prescribed.  Make sure you  all your prescriptions from your pharmacy.  Diet and Fluids  Eat a healthy, balanced diet.  Drink plenty of fluids (water is best) throughout the day.   Food Safety: Because you are on medications that lower your immune system, it's extra important to be careful about what you eat to avoid getting food poisoning.  Wash your hands well before preparing or eating food.  Wash fruits and vegetables thoroughly.  Make sure all meat, poultry, an      SECONDARY DISCHARGE DIAGNOSES  Diagnosis: History of renal transplant  Assessment and Plan of Treatment: Your tacrolimus dose was decreased to 1mg twice daily based on your levels. You should follow up with renal transplant doctor prior to resuming your cellcept dose as this can increase your risk of infection.     PRINCIPAL DISCHARGE DIAGNOSIS  Diagnosis: Pyelonephritis  Assessment and Plan of Treatment: You came to the hospital because you had a serious kidney infection in your transplanted kidney. You developed what we call pyelonephritis - an infection that reached deep into your kidney tissue. This was quite serious and caused an infection throughout your body (sepsis).  When you first arrived, we started you on an antibiotic called Zosyn while waiting for test results. Your urine culture showed a specific type of bacteria called ESBL Klebsiella, which is resistant to many common antibiotics. We then switched you to a stronger antibiotic called ertapenem that could fight this bacteria.  You're now well enough to go home, but you need to continue antibiotics. You'll take Bactrim (one double-strength tablet twice daily) until June 13th to complete a total of 10 days of treatment.  Important medication changes:  Your tacrolimus (anti-rejection medication) has been decreased to 1mg twice daily  Your MMF (another anti-rejection medication) is temporarily stopped until you finish your antibiotics and see Dr. Urbina  Take Bactrim as prescribed until June 13th. Do not take your suppressive home antibiotics while you are also on Bactrim.   Follow-up needed:  You need blood tests twice weekly while on Bactrim to check your kidney function  Please get blood work (BMP) on Friday June 6th, Monday June 9th, and Thursday June 12th  Results should be faxed to Dr. Urbina at [phone]  Follow up with Dr. Urbina (transplant nephrology) at your scheduled appointment  Return to the hospital immediately if you experience:  Fever over 101°F or chills  Pain over your transplanted kidney area  Burning or pain with urination  Blood in your urine  Decreased urination  Extreme fatigue or confusion  Nausea/vomiting that prevents you from taking your medications      SECONDARY DISCHARGE DIAGNOSES  Diagnosis: History of renal transplant  Assessment and Plan of Treatment: Your tacrolimus dose was decreased to 1mg twice daily based on your levels. You should follow up with renal transplant doctor prior to resuming your Myfortic dose as this can increase your risk of infection. The [facility] has been placed in your discharge paperwork if you would like to see a physician closer to home.    Diagnosis: Hypothyroidism  Assessment and Plan of Treatment: When you were admitted, your thyroid function tests were borderline for hyperthyroid (meaning your synthroid dose would have to be lowered). Repeat tests were therapeutic. This discrepency could be due to acute illness. You should have your thyroid function tests re-checked in 4-6 weeks.

## 2025-06-04 NOTE — DISCHARGE NOTE NURSING/CASE MANAGEMENT/SOCIAL WORK - FINANCIAL ASSISTANCE
[facility] provides services at a reduced cost to those who are determined to be eligible through [facility]’s financial assistance program. Information regarding [facility]’s financial assistance program can be found by going to [email or web address] or by calling [phone].

## 2025-06-04 NOTE — PROGRESS NOTE ADULT - PROVIDER SPECIALTY LIST ADULT
Infectious Disease
Nephrology
Internal Medicine

## 2025-06-04 NOTE — DISCHARGE NOTE PROVIDER - CARE PROVIDERS DIRECT ADDRESSES
,[email or web address]

## 2025-06-04 NOTE — DISCHARGE NOTE PROVIDER - CARE PROVIDER_API CALL
CHARLENE HOYT  [address] [place] [address]  Phone: [phone]  Fax: ()-  Follow Up Time:    CHARLENE HOYT  [address] [place] [address]  Phone: [phone]  Fax: ()-  Follow Up Time:     Jeffrey Noel  Nephrology  [address] [place] [address], [address]  [place] [address]  Phone: [phone]  Fax: [phone]  Follow Up Time:    CHARLENE HOYT  [address] [place] [address]  Phone: [phone]  Fax: ()-  Follow Up Time:    CHARLENE HOYT  [address] [place] [address]  Phone: [phone]  Fax: ()-  Follow Up Time:     Jeffrey Noel  Nephrology  [address] [place] [address], [address]  [place] [address]  Phone: [phone]  Fax: [phone]  Follow Up Time:

## 2025-06-04 NOTE — DISCHARGE NOTE NURSING/CASE MANAGEMENT/SOCIAL WORK - PATIENT PORTAL LINK FT
You can access the FollowMyHealth Patient Portal offered by [facility] by registering at the following website: [email or web address]. By joining [facility]’s FollowMyHealth portal, you will also be able to view your health information using other applications (apps) compatible with our system.

## 2025-06-04 NOTE — DISCHARGE NOTE PROVIDER - NSDCMRMEDTOKEN_GEN_ALL_CORE_FT
amLODIPine 2.5 mg oral tablet: 1 tab(s) orally once a day  enalapril 10 mg oral tablet: 1 tab(s) orally once a day  levothyroxine 75 mcg (0.075 mg) oral tablet: 0.5 tab(s) orally once a day  metFORMIN 750 mg oral tablet, extended release: 1 tab(s) orally once a day  methenamine hippurate 1 g oral tablet: 1 tab(s) orally 2 times a day  metoprolol succinate 50 mg oral tablet, extended release: 1 tab(s) orally once a day  Multiple Vitamins oral tablet: 1 tab(s) orally once a day  Myfortic 360 mg oral delayed release tablet: 2 tab(s) orally 2 times a day  omeprazole 20 mg oral delayed release tablet: 1 tab(s) orally once a day  predniSONE 5 mg oral tablet: 1 tab(s) orally once a day  simvastatin 20 mg oral tablet: 1 tab(s) orally once a day  tacrolimus 0.5 mg oral capsule: 3 cap(s) orally 2 times a day   amLODIPine 2.5 mg oral tablet: 1 tab(s) orally once a day  Bactrim  mg-160 mg oral tablet: 1 tab(s) orally 2 times a day  enalapril 10 mg oral tablet: 1 tab(s) orally once a day  levothyroxine 75 mcg (0.075 mg) oral tablet: 1 tab(s) orally once a day  metFORMIN 750 mg oral tablet, extended release: 1 tab(s) orally once a day  metoprolol succinate 50 mg oral tablet, extended release: 1 tab(s) orally once a day  Multiple Vitamins oral tablet: 1 tab(s) orally once a day  omeprazole 20 mg oral delayed release tablet: 1 tab(s) orally once a day  predniSONE 5 mg oral tablet: 1 tab(s) orally once a day  simvastatin 20 mg oral tablet: 1 tab(s) orally once a day  tacrolimus 1 mg oral capsule: 1 cap(s) orally 2 times a day

## 2025-06-04 NOTE — DISCHARGE NOTE PROVIDER - NSFOLLOWUPCLINICS_GEN_ALL_ED_FT
[facility]  Nephrology  [address] [place] [address], [address] [place] [address]  Phone: [phone]  Fax:

## 2025-06-04 NOTE — DISCHARGE NOTE NURSING/CASE MANAGEMENT/SOCIAL WORK - NSSCTYPOFSERV_GEN_ALL_CORE
RN follow up visit. Nurse to visit 24-48 hours from discharge. Nurse to call prior to visit to arrange.

## 2025-06-11 ENCOUNTER — TRANSCRIPTION ENCOUNTER (OUTPATIENT)
Age: 85
End: 2025-06-11

## 2025-06-25 ENCOUNTER — APPOINTMENT (OUTPATIENT)
Dept: UROGYNECOLOGY | Facility: CLINIC | Age: 85
End: 2025-06-25

## 2025-07-02 ENCOUNTER — APPOINTMENT (OUTPATIENT)
Dept: GERIATRICS | Facility: CLINIC | Age: 85
End: 2025-07-02

## 2025-07-07 ENCOUNTER — RX RENEWAL (OUTPATIENT)
Age: 85
End: 2025-07-07

## 2025-08-25 ENCOUNTER — APPOINTMENT (OUTPATIENT)
Dept: GERIATRICS | Facility: CLINIC | Age: 85
End: 2025-08-25

## 2025-08-25 VITALS
OXYGEN SATURATION: 97 % | DIASTOLIC BLOOD PRESSURE: 79 MMHG | HEART RATE: 71 BPM | WEIGHT: 110 LBS | RESPIRATION RATE: 14 BRPM | TEMPERATURE: 97.9 F | SYSTOLIC BLOOD PRESSURE: 144 MMHG | BODY MASS INDEX: 20.12 KG/M2

## 2025-08-25 PROCEDURE — 99215 OFFICE O/P EST HI 40 MIN: CPT

## 2025-09-03 ENCOUNTER — APPOINTMENT (OUTPATIENT)
Dept: NEPHROLOGY | Facility: CLINIC | Age: 85
End: 2025-09-03